# Patient Record
Sex: FEMALE | Race: WHITE | Employment: OTHER | URBAN - METROPOLITAN AREA
[De-identification: names, ages, dates, MRNs, and addresses within clinical notes are randomized per-mention and may not be internally consistent; named-entity substitution may affect disease eponyms.]

---

## 2021-01-01 ENCOUNTER — APPOINTMENT (OUTPATIENT)
Dept: INTERVENTIONAL RADIOLOGY/VASCULAR | Age: 71
DRG: 207 | End: 2021-01-01
Attending: INTERNAL MEDICINE
Payer: MEDICARE

## 2021-01-01 ENCOUNTER — APPOINTMENT (OUTPATIENT)
Dept: CT IMAGING | Age: 71
DRG: 207 | End: 2021-01-01
Attending: INTERNAL MEDICINE
Payer: MEDICARE

## 2021-01-01 ENCOUNTER — APPOINTMENT (OUTPATIENT)
Dept: GENERAL RADIOLOGY | Age: 71
DRG: 207 | End: 2021-01-01
Attending: INTERNAL MEDICINE
Payer: MEDICARE

## 2021-01-01 ENCOUNTER — HOSPITAL ENCOUNTER (EMERGENCY)
Age: 71
Discharge: ANOTHER HOSPITAL | End: 2021-12-09
Attending: EMERGENCY MEDICINE
Payer: MEDICARE

## 2021-01-01 ENCOUNTER — HOSPITAL ENCOUNTER (INPATIENT)
Age: 71
LOS: 13 days | DRG: 207 | End: 2021-12-22
Attending: INTERNAL MEDICINE | Admitting: INTERNAL MEDICINE
Payer: MEDICARE

## 2021-01-01 ENCOUNTER — APPOINTMENT (OUTPATIENT)
Dept: ULTRASOUND IMAGING | Age: 71
DRG: 207 | End: 2021-01-01
Attending: INTERNAL MEDICINE
Payer: MEDICARE

## 2021-01-01 ENCOUNTER — APPOINTMENT (OUTPATIENT)
Dept: GENERAL RADIOLOGY | Age: 71
End: 2021-01-01
Payer: MEDICARE

## 2021-01-01 VITALS
TEMPERATURE: 98.5 F | OXYGEN SATURATION: 92 % | RESPIRATION RATE: 18 BRPM | BODY MASS INDEX: 37.76 KG/M2 | HEART RATE: 74 BPM | WEIGHT: 200 LBS | DIASTOLIC BLOOD PRESSURE: 77 MMHG | SYSTOLIC BLOOD PRESSURE: 140 MMHG | HEIGHT: 61 IN

## 2021-01-01 VITALS
DIASTOLIC BLOOD PRESSURE: 52 MMHG | BODY MASS INDEX: 44.29 KG/M2 | SYSTOLIC BLOOD PRESSURE: 124 MMHG | WEIGHT: 234.57 LBS | OXYGEN SATURATION: 78 % | TEMPERATURE: 96.4 F | HEIGHT: 61 IN

## 2021-01-01 DIAGNOSIS — U07.1 PNEUMONIA DUE TO COVID-19 VIRUS: Primary | ICD-10-CM

## 2021-01-01 DIAGNOSIS — R06.03 RESPIRATORY DISTRESS: ICD-10-CM

## 2021-01-01 DIAGNOSIS — J12.82 PNEUMONIA DUE TO COVID-19 VIRUS: Primary | ICD-10-CM

## 2021-01-01 LAB
ALBUMIN SERPL-MCNC: 1.8 G/DL (ref 3.5–4.6)
ALBUMIN SERPL-MCNC: 2 G/DL (ref 3.5–4.6)
ALBUMIN SERPL-MCNC: 2.1 G/DL (ref 3.5–4.6)
ALBUMIN SERPL-MCNC: 2.1 G/DL (ref 3.5–4.6)
ALBUMIN SERPL-MCNC: 2.2 G/DL (ref 3.5–4.6)
ALBUMIN SERPL-MCNC: 2.2 G/DL (ref 3.5–4.6)
ALBUMIN SERPL-MCNC: 2.3 G/DL (ref 3.5–4.6)
ALBUMIN SERPL-MCNC: 2.4 G/DL (ref 3.5–4.6)
ALBUMIN SERPL-MCNC: 2.5 G/DL (ref 3.5–4.6)
ALBUMIN SERPL-MCNC: 2.6 G/DL (ref 3.5–4.6)
ALBUMIN SERPL-MCNC: 2.6 G/DL (ref 3.5–4.6)
ALBUMIN SERPL-MCNC: 2.7 G/DL (ref 3.5–4.6)
ALBUMIN SERPL-MCNC: 3.2 G/DL (ref 3.5–4.6)
ALBUMIN SERPL-MCNC: 3.3 G/DL (ref 3.5–4.6)
ALBUMIN SERPL-MCNC: 3.5 G/DL (ref 3.5–4.6)
ALP BLD-CCNC: 110 U/L (ref 40–130)
ALP BLD-CCNC: 118 U/L (ref 40–130)
ALP BLD-CCNC: 118 U/L (ref 40–130)
ALP BLD-CCNC: 121 U/L (ref 40–130)
ALP BLD-CCNC: 124 U/L (ref 40–130)
ALP BLD-CCNC: 125 U/L (ref 40–130)
ALP BLD-CCNC: 127 U/L (ref 40–130)
ALP BLD-CCNC: 130 U/L (ref 40–130)
ALP BLD-CCNC: 131 U/L (ref 40–130)
ALP BLD-CCNC: 133 U/L (ref 40–130)
ALP BLD-CCNC: 140 U/L (ref 40–130)
ALP BLD-CCNC: 140 U/L (ref 40–130)
ALP BLD-CCNC: 146 U/L (ref 40–130)
ALP BLD-CCNC: 147 U/L (ref 40–130)
ALP BLD-CCNC: 160 U/L (ref 40–130)
ALT SERPL-CCNC: 108 U/L (ref 0–33)
ALT SERPL-CCNC: 124 U/L (ref 0–33)
ALT SERPL-CCNC: 18 U/L (ref 0–33)
ALT SERPL-CCNC: 28 U/L (ref 0–33)
ALT SERPL-CCNC: 29 U/L (ref 0–33)
ALT SERPL-CCNC: 29 U/L (ref 0–33)
ALT SERPL-CCNC: 31 U/L (ref 0–33)
ALT SERPL-CCNC: 36 U/L (ref 0–33)
ALT SERPL-CCNC: 42 U/L (ref 0–33)
ALT SERPL-CCNC: 58 U/L (ref 0–33)
ALT SERPL-CCNC: 59 U/L (ref 0–33)
ALT SERPL-CCNC: 63 U/L (ref 0–33)
ALT SERPL-CCNC: 64 U/L (ref 0–33)
ALT SERPL-CCNC: 70 U/L (ref 0–33)
ALT SERPL-CCNC: 77 U/L (ref 0–33)
AMORPHOUS: ABNORMAL
ANION GAP SERPL CALCULATED.3IONS-SCNC: 10 MEQ/L (ref 9–15)
ANION GAP SERPL CALCULATED.3IONS-SCNC: 11 MEQ/L (ref 9–15)
ANION GAP SERPL CALCULATED.3IONS-SCNC: 11 MEQ/L (ref 9–15)
ANION GAP SERPL CALCULATED.3IONS-SCNC: 12 MEQ/L (ref 9–15)
ANION GAP SERPL CALCULATED.3IONS-SCNC: 13 MEQ/L (ref 9–15)
ANION GAP SERPL CALCULATED.3IONS-SCNC: 14 MEQ/L (ref 9–15)
ANION GAP SERPL CALCULATED.3IONS-SCNC: 14 MEQ/L (ref 9–15)
ANION GAP SERPL CALCULATED.3IONS-SCNC: 15 MEQ/L (ref 9–15)
ANION GAP SERPL CALCULATED.3IONS-SCNC: 16 MEQ/L (ref 9–15)
ANION GAP SERPL CALCULATED.3IONS-SCNC: 16 MEQ/L (ref 9–15)
ANION GAP SERPL CALCULATED.3IONS-SCNC: 20 MEQ/L (ref 9–15)
ANION GAP SERPL CALCULATED.3IONS-SCNC: 9 MEQ/L (ref 9–15)
ANISOCYTOSIS: ABNORMAL
APTT: 28.8 SEC (ref 24.4–36.8)
AST SERPL-CCNC: 108 U/L (ref 0–35)
AST SERPL-CCNC: 11 U/L (ref 0–35)
AST SERPL-CCNC: 13 U/L (ref 0–35)
AST SERPL-CCNC: 15 U/L (ref 0–35)
AST SERPL-CCNC: 153 U/L (ref 0–35)
AST SERPL-CCNC: 19 U/L (ref 0–35)
AST SERPL-CCNC: 21 U/L (ref 0–35)
AST SERPL-CCNC: 22 U/L (ref 0–35)
AST SERPL-CCNC: 22 U/L (ref 0–35)
AST SERPL-CCNC: 23 U/L (ref 0–35)
AST SERPL-CCNC: 25 U/L (ref 0–35)
AST SERPL-CCNC: 26 U/L (ref 0–35)
AST SERPL-CCNC: 68 U/L (ref 0–35)
AST SERPL-CCNC: 73 U/L (ref 0–35)
AST SERPL-CCNC: <5 U/L (ref 0–35)
ATYPICAL LYMPHOCYTE RELATIVE PERCENT: 1 %
BACTERIA: ABNORMAL /HPF
BACTERIA: NEGATIVE /HPF
BACTERIA: NEGATIVE /HPF
BANDED NEUTROPHILS RELATIVE PERCENT: 1 % (ref 5–11)
BANDED NEUTROPHILS RELATIVE PERCENT: 2 % (ref 5–11)
BANDED NEUTROPHILS RELATIVE PERCENT: 2 % (ref 5–11)
BANDED NEUTROPHILS RELATIVE PERCENT: 3 % (ref 5–11)
BANDED NEUTROPHILS RELATIVE PERCENT: 3 % (ref 5–11)
BANDED NEUTROPHILS RELATIVE PERCENT: ABNORMAL % (ref 5–11)
BASE EXCESS ARTERIAL: -1 (ref -3–3)
BASE EXCESS ARTERIAL: -10 (ref -3–3)
BASE EXCESS ARTERIAL: -12 (ref -3–3)
BASE EXCESS ARTERIAL: -13 (ref -3–3)
BASE EXCESS ARTERIAL: -2 (ref -3–3)
BASE EXCESS ARTERIAL: -3 (ref -3–3)
BASE EXCESS ARTERIAL: -3 (ref -3–3)
BASE EXCESS ARTERIAL: -4 (ref -3–3)
BASE EXCESS ARTERIAL: -5 (ref -3–3)
BASE EXCESS ARTERIAL: -6 (ref -3–3)
BASE EXCESS ARTERIAL: -9 (ref -3–3)
BASE EXCESS ARTERIAL: 0 (ref -3–3)
BASE EXCESS ARTERIAL: 1 (ref -3–3)
BASOPHILS ABSOLUTE: 0 K/UL (ref 0–0.1)
BASOPHILS ABSOLUTE: 0 K/UL (ref 0–0.2)
BASOPHILS ABSOLUTE: ABNORMAL K/UL (ref 0–0.2)
BASOPHILS RELATIVE PERCENT: 0 %
BASOPHILS RELATIVE PERCENT: 0 %
BASOPHILS RELATIVE PERCENT: 0.1 %
BASOPHILS RELATIVE PERCENT: 0.2 %
BASOPHILS RELATIVE PERCENT: 0.2 %
BASOPHILS RELATIVE PERCENT: 0.3 %
BASOPHILS RELATIVE PERCENT: 0.3 % (ref 0.1–1.2)
BASOPHILS RELATIVE PERCENT: ABNORMAL %
BILIRUB SERPL-MCNC: 0.3 MG/DL (ref 0.2–0.7)
BILIRUB SERPL-MCNC: 0.4 MG/DL (ref 0.2–0.7)
BILIRUB SERPL-MCNC: 0.5 MG/DL (ref 0.2–0.7)
BILIRUB SERPL-MCNC: 0.5 MG/DL (ref 0.2–0.7)
BILIRUB SERPL-MCNC: 3.1 MG/DL (ref 0.2–0.7)
BILIRUB SERPL-MCNC: <0.2 MG/DL (ref 0.2–0.7)
BILIRUB SERPL-MCNC: <0.2 MG/DL (ref 0.2–0.7)
BILIRUBIN URINE: NEGATIVE
BLOOD CULTURE, ROUTINE: NORMAL
BLOOD CULTURE, ROUTINE: NORMAL
BLOOD, URINE: ABNORMAL
BLOOD, URINE: ABNORMAL
BLOOD, URINE: NEGATIVE
BUN BLDV-MCNC: 109 MG/DL (ref 8–23)
BUN BLDV-MCNC: 112 MG/DL (ref 8–23)
BUN BLDV-MCNC: 116 MG/DL (ref 8–23)
BUN BLDV-MCNC: 118 MG/DL (ref 8–23)
BUN BLDV-MCNC: 120 MG/DL (ref 8–23)
BUN BLDV-MCNC: 20 MG/DL (ref 8–23)
BUN BLDV-MCNC: 22 MG/DL (ref 8–23)
BUN BLDV-MCNC: 23 MG/DL (ref 8–23)
BUN BLDV-MCNC: 29 MG/DL (ref 8–23)
BUN BLDV-MCNC: 40 MG/DL (ref 8–23)
BUN BLDV-MCNC: 45 MG/DL (ref 8–23)
BUN BLDV-MCNC: 53 MG/DL (ref 8–23)
BUN BLDV-MCNC: 57 MG/DL (ref 8–23)
BUN BLDV-MCNC: 68 MG/DL (ref 8–23)
BUN BLDV-MCNC: 83 MG/DL (ref 8–23)
BUN BLDV-MCNC: 93 MG/DL (ref 8–23)
C-REACTIVE PROTEIN: 22.5 MG/L (ref 0–5)
C-REACTIVE PROTEIN: 38.8 MG/L (ref 0–5)
C-REACTIVE PROTEIN: 57.8 MG/L (ref 0–5)
CALCIUM IONIZED: 1.15 MMOL/L (ref 1.12–1.32)
CALCIUM IONIZED: 1.17 MMOL/L (ref 1.12–1.32)
CALCIUM IONIZED: 1.2 MMOL/L (ref 1.12–1.32)
CALCIUM IONIZED: 1.2 MMOL/L (ref 1.12–1.32)
CALCIUM IONIZED: 1.21 MMOL/L (ref 1.12–1.32)
CALCIUM IONIZED: 1.21 MMOL/L (ref 1.12–1.32)
CALCIUM IONIZED: 1.22 MMOL/L (ref 1.12–1.32)
CALCIUM IONIZED: 1.22 MMOL/L (ref 1.12–1.32)
CALCIUM IONIZED: 1.23 MMOL/L (ref 1.12–1.32)
CALCIUM IONIZED: 1.23 MMOL/L (ref 1.12–1.32)
CALCIUM IONIZED: 1.24 MMOL/L (ref 1.12–1.32)
CALCIUM IONIZED: 1.24 MMOL/L (ref 1.12–1.32)
CALCIUM IONIZED: 1.25 MMOL/L (ref 1.12–1.32)
CALCIUM IONIZED: 1.25 MMOL/L (ref 1.12–1.32)
CALCIUM IONIZED: 1.26 MMOL/L (ref 1.12–1.32)
CALCIUM IONIZED: 1.27 MMOL/L (ref 1.12–1.32)
CALCIUM IONIZED: 1.27 MMOL/L (ref 1.12–1.32)
CALCIUM IONIZED: 1.28 MMOL/L (ref 1.12–1.32)
CALCIUM IONIZED: 1.29 MMOL/L (ref 1.12–1.32)
CALCIUM IONIZED: 1.29 MMOL/L (ref 1.12–1.32)
CALCIUM IONIZED: 1.3 MMOL/L (ref 1.12–1.32)
CALCIUM IONIZED: 1.31 MMOL/L (ref 1.12–1.32)
CALCIUM IONIZED: 1.32 MMOL/L (ref 1.12–1.32)
CALCIUM IONIZED: 1.34 MMOL/L (ref 1.12–1.32)
CALCIUM IONIZED: 1.37 MMOL/L (ref 1.12–1.32)
CALCIUM SERPL-MCNC: 7.5 MG/DL (ref 8.5–9.9)
CALCIUM SERPL-MCNC: 7.8 MG/DL (ref 8.5–9.9)
CALCIUM SERPL-MCNC: 8.1 MG/DL (ref 8.5–9.9)
CALCIUM SERPL-MCNC: 8.2 MG/DL (ref 8.5–9.9)
CALCIUM SERPL-MCNC: 8.6 MG/DL (ref 8.5–9.9)
CALCIUM SERPL-MCNC: 8.7 MG/DL (ref 8.5–9.9)
CALCIUM SERPL-MCNC: 8.7 MG/DL (ref 8.5–9.9)
CALCIUM SERPL-MCNC: 8.8 MG/DL (ref 8.5–9.9)
CALCIUM SERPL-MCNC: 8.9 MG/DL (ref 8.5–9.9)
CALCIUM SERPL-MCNC: 9 MG/DL (ref 8.5–9.9)
CALCIUM SERPL-MCNC: 9.2 MG/DL (ref 8.5–9.9)
CHLORIDE BLD-SCNC: 102 MEQ/L (ref 95–107)
CHLORIDE BLD-SCNC: 103 MEQ/L (ref 95–107)
CHLORIDE BLD-SCNC: 104 MEQ/L (ref 95–107)
CHLORIDE BLD-SCNC: 105 MEQ/L (ref 95–107)
CHLORIDE BLD-SCNC: 106 MEQ/L (ref 95–107)
CHLORIDE BLD-SCNC: 106 MEQ/L (ref 95–107)
CHLORIDE BLD-SCNC: 108 MEQ/L (ref 95–107)
CHLORIDE BLD-SCNC: 110 MEQ/L (ref 95–107)
CHLORIDE BLD-SCNC: 112 MEQ/L (ref 95–107)
CHLORIDE BLD-SCNC: 112 MEQ/L (ref 95–107)
CHLORIDE BLD-SCNC: 120 MEQ/L (ref 95–107)
CHLORIDE BLD-SCNC: 97 MEQ/L (ref 95–107)
CHLORIDE BLD-SCNC: 97 MEQ/L (ref 95–107)
CHLORIDE BLD-SCNC: 98 MEQ/L (ref 95–107)
CLARITY: ABNORMAL
CLARITY: CLEAR
CLARITY: CLEAR
CO2: 12 MEQ/L (ref 20–31)
CO2: 17 MEQ/L (ref 20–31)
CO2: 18 MEQ/L (ref 20–31)
CO2: 18 MEQ/L (ref 20–31)
CO2: 19 MEQ/L (ref 20–31)
CO2: 20 MEQ/L (ref 20–31)
CO2: 22 MEQ/L (ref 20–31)
CO2: 23 MEQ/L (ref 20–31)
CO2: 23 MEQ/L (ref 20–31)
CO2: 25 MEQ/L (ref 20–31)
CO2: 25 MEQ/L (ref 20–31)
CO2: 29 MEQ/L (ref 20–31)
COLOR: YELLOW
CREAT SERPL-MCNC: 0.64 MG/DL (ref 0.5–0.9)
CREAT SERPL-MCNC: 0.76 MG/DL (ref 0.5–0.9)
CREAT SERPL-MCNC: 0.81 MG/DL (ref 0.5–0.9)
CREAT SERPL-MCNC: 0.94 MG/DL (ref 0.5–0.9)
CREAT SERPL-MCNC: 1 MG/DL (ref 0.5–0.9)
CREAT SERPL-MCNC: 1.19 MG/DL (ref 0.5–0.9)
CREAT SERPL-MCNC: 1.96 MG/DL (ref 0.5–0.9)
CREAT SERPL-MCNC: 1.99 MG/DL (ref 0.5–0.9)
CREAT SERPL-MCNC: 2.51 MG/DL (ref 0.5–0.9)
CREAT SERPL-MCNC: 2.92 MG/DL (ref 0.5–0.9)
CREAT SERPL-MCNC: 3.01 MG/DL (ref 0.5–0.9)
CREAT SERPL-MCNC: 3.12 MG/DL (ref 0.5–0.9)
CREAT SERPL-MCNC: 3.2 MG/DL (ref 0.5–0.9)
CREAT SERPL-MCNC: 3.36 MG/DL (ref 0.5–0.9)
CREAT SERPL-MCNC: 3.36 MG/DL (ref 0.5–0.9)
CREAT SERPL-MCNC: 3.41 MG/DL (ref 0.5–0.9)
CULTURE, BLOOD 2: NORMAL
CULTURE, RESPIRATORY: ABNORMAL
CULTURE, RESPIRATORY: ABNORMAL
D DIMER: 1.85 MG/L FEU (ref 0–0.5)
D DIMER: 1.89 MG/L FEU (ref 0–0.5)
D DIMER: 2.03 MG/L FEU (ref 0–0.5)
D DIMER: 3.8 MG/L FEU (ref 0–0.5)
EKG ATRIAL RATE: 79 BPM
EKG P AXIS: 26 DEGREES
EKG P-R INTERVAL: 164 MS
EKG Q-T INTERVAL: 362 MS
EKG QRS DURATION: 90 MS
EKG QTC CALCULATION (BAZETT): 415 MS
EKG R AXIS: 12 DEGREES
EKG T AXIS: 28 DEGREES
EKG VENTRICULAR RATE: 79 BPM
EOSINOPHILS ABSOLUTE: 0 K/UL (ref 0–0.4)
EOSINOPHILS ABSOLUTE: 0 K/UL (ref 0–0.7)
EOSINOPHILS ABSOLUTE: 0.1 K/UL (ref 0–0.7)
EOSINOPHILS ABSOLUTE: 0.1 K/UL (ref 0–0.7)
EOSINOPHILS ABSOLUTE: 0.6 K/UL (ref 0–0.7)
EOSINOPHILS ABSOLUTE: ABNORMAL K/UL (ref 0–0.7)
EOSINOPHILS RELATIVE PERCENT: 0 %
EOSINOPHILS RELATIVE PERCENT: 0 % (ref 0.7–5.8)
EOSINOPHILS RELATIVE PERCENT: 0.1 %
EOSINOPHILS RELATIVE PERCENT: 0.1 %
EOSINOPHILS RELATIVE PERCENT: 0.2 %
EOSINOPHILS RELATIVE PERCENT: 0.3 %
EOSINOPHILS RELATIVE PERCENT: 0.4 %
EOSINOPHILS RELATIVE PERCENT: 0.7 %
EOSINOPHILS RELATIVE PERCENT: 0.9 %
EOSINOPHILS RELATIVE PERCENT: 3 %
EOSINOPHILS RELATIVE PERCENT: ABNORMAL %
EPITHELIAL CELLS, UA: ABNORMAL /HPF
EPITHELIAL CELLS, UA: ABNORMAL /HPF (ref 0–5)
EPITHELIAL CELLS, UA: NORMAL /HPF (ref 0–5)
FERRITIN: 1655 UG/L (ref 13–150)
FERRITIN: 999 UG/L (ref 13–150)
FIBRINOGEN: 704 MG/DL (ref 235–507)
GFR AFRICAN AMERICAN: 15
GFR AFRICAN AMERICAN: 15
GFR AFRICAN AMERICAN: 16
GFR AFRICAN AMERICAN: 16.3
GFR AFRICAN AMERICAN: 16.3
GFR AFRICAN AMERICAN: 17
GFR AFRICAN AMERICAN: 17.2
GFR AFRICAN AMERICAN: 17.7
GFR AFRICAN AMERICAN: 18
GFR AFRICAN AMERICAN: 18
GFR AFRICAN AMERICAN: 18.5
GFR AFRICAN AMERICAN: 19
GFR AFRICAN AMERICAN: 19.1
GFR AFRICAN AMERICAN: 20
GFR AFRICAN AMERICAN: 22.8
GFR AFRICAN AMERICAN: 24
GFR AFRICAN AMERICAN: 24
GFR AFRICAN AMERICAN: 27
GFR AFRICAN AMERICAN: 29.8
GFR AFRICAN AMERICAN: 30.3
GFR AFRICAN AMERICAN: 49
GFR AFRICAN AMERICAN: 49
GFR AFRICAN AMERICAN: 53
GFR AFRICAN AMERICAN: 53.9
GFR AFRICAN AMERICAN: 59
GFR AFRICAN AMERICAN: >60
GFR NON-AFRICAN AMERICAN: 12
GFR NON-AFRICAN AMERICAN: 12
GFR NON-AFRICAN AMERICAN: 13
GFR NON-AFRICAN AMERICAN: 13.2
GFR NON-AFRICAN AMERICAN: 13.5
GFR NON-AFRICAN AMERICAN: 13.5
GFR NON-AFRICAN AMERICAN: 14
GFR NON-AFRICAN AMERICAN: 14.2
GFR NON-AFRICAN AMERICAN: 14.7
GFR NON-AFRICAN AMERICAN: 15
GFR NON-AFRICAN AMERICAN: 15.3
GFR NON-AFRICAN AMERICAN: 15.8
GFR NON-AFRICAN AMERICAN: 17
GFR NON-AFRICAN AMERICAN: 18.8
GFR NON-AFRICAN AMERICAN: 20
GFR NON-AFRICAN AMERICAN: 20
GFR NON-AFRICAN AMERICAN: 22
GFR NON-AFRICAN AMERICAN: 24.6
GFR NON-AFRICAN AMERICAN: 25.1
GFR NON-AFRICAN AMERICAN: 40
GFR NON-AFRICAN AMERICAN: 40
GFR NON-AFRICAN AMERICAN: 44
GFR NON-AFRICAN AMERICAN: 44.6
GFR NON-AFRICAN AMERICAN: 49
GFR NON-AFRICAN AMERICAN: 54.5
GFR NON-AFRICAN AMERICAN: 58.5
GFR NON-AFRICAN AMERICAN: >60
GLOBULIN: 3.1 G/DL (ref 2.3–3.5)
GLOBULIN: 3.4 G/DL (ref 2.3–3.5)
GLOBULIN: 3.5 G/DL (ref 2.3–3.5)
GLOBULIN: 3.6 G/DL (ref 2.3–3.5)
GLOBULIN: 3.7 G/DL (ref 2.3–3.5)
GLOBULIN: 3.7 G/DL (ref 2.3–3.5)
GLOBULIN: 3.8 G/DL (ref 2.3–3.5)
GLOBULIN: 4 G/DL (ref 2.3–3.5)
GLOBULIN: 4.2 G/DL (ref 2.3–3.5)
GLOBULIN: 4.4 G/DL (ref 2.3–3.5)
GLUCOSE BLD-MCNC: 101 MG/DL (ref 60–115)
GLUCOSE BLD-MCNC: 104 MG/DL (ref 60–115)
GLUCOSE BLD-MCNC: 104 MG/DL (ref 70–99)
GLUCOSE BLD-MCNC: 109 MG/DL (ref 70–99)
GLUCOSE BLD-MCNC: 111 MG/DL (ref 60–115)
GLUCOSE BLD-MCNC: 113 MG/DL (ref 70–99)
GLUCOSE BLD-MCNC: 115 MG/DL (ref 60–115)
GLUCOSE BLD-MCNC: 120 MG/DL (ref 70–99)
GLUCOSE BLD-MCNC: 122 MG/DL (ref 60–115)
GLUCOSE BLD-MCNC: 124 MG/DL (ref 60–115)
GLUCOSE BLD-MCNC: 124 MG/DL (ref 70–99)
GLUCOSE BLD-MCNC: 125 MG/DL (ref 60–115)
GLUCOSE BLD-MCNC: 133 MG/DL (ref 60–115)
GLUCOSE BLD-MCNC: 134 MG/DL (ref 60–115)
GLUCOSE BLD-MCNC: 135 MG/DL (ref 60–115)
GLUCOSE BLD-MCNC: 136 MG/DL (ref 60–115)
GLUCOSE BLD-MCNC: 136 MG/DL (ref 60–115)
GLUCOSE BLD-MCNC: 139 MG/DL (ref 60–115)
GLUCOSE BLD-MCNC: 144 MG/DL (ref 70–99)
GLUCOSE BLD-MCNC: 148 MG/DL (ref 60–115)
GLUCOSE BLD-MCNC: 149 MG/DL (ref 60–115)
GLUCOSE BLD-MCNC: 149 MG/DL (ref 60–115)
GLUCOSE BLD-MCNC: 150 MG/DL (ref 60–115)
GLUCOSE BLD-MCNC: 151 MG/DL (ref 60–115)
GLUCOSE BLD-MCNC: 151 MG/DL (ref 60–115)
GLUCOSE BLD-MCNC: 152 MG/DL (ref 60–115)
GLUCOSE BLD-MCNC: 154 MG/DL (ref 60–115)
GLUCOSE BLD-MCNC: 157 MG/DL (ref 70–99)
GLUCOSE BLD-MCNC: 158 MG/DL (ref 60–115)
GLUCOSE BLD-MCNC: 159 MG/DL (ref 60–115)
GLUCOSE BLD-MCNC: 161 MG/DL (ref 60–115)
GLUCOSE BLD-MCNC: 162 MG/DL (ref 60–115)
GLUCOSE BLD-MCNC: 163 MG/DL (ref 60–115)
GLUCOSE BLD-MCNC: 164 MG/DL (ref 60–115)
GLUCOSE BLD-MCNC: 164 MG/DL (ref 70–99)
GLUCOSE BLD-MCNC: 165 MG/DL (ref 60–115)
GLUCOSE BLD-MCNC: 168 MG/DL (ref 60–115)
GLUCOSE BLD-MCNC: 168 MG/DL (ref 60–115)
GLUCOSE BLD-MCNC: 170 MG/DL (ref 60–115)
GLUCOSE BLD-MCNC: 171 MG/DL (ref 60–115)
GLUCOSE BLD-MCNC: 172 MG/DL (ref 60–115)
GLUCOSE BLD-MCNC: 173 MG/DL (ref 60–115)
GLUCOSE BLD-MCNC: 175 MG/DL (ref 60–115)
GLUCOSE BLD-MCNC: 175 MG/DL (ref 60–115)
GLUCOSE BLD-MCNC: 176 MG/DL (ref 60–115)
GLUCOSE BLD-MCNC: 177 MG/DL (ref 60–115)
GLUCOSE BLD-MCNC: 178 MG/DL (ref 60–115)
GLUCOSE BLD-MCNC: 180 MG/DL (ref 60–115)
GLUCOSE BLD-MCNC: 180 MG/DL (ref 70–99)
GLUCOSE BLD-MCNC: 181 MG/DL (ref 60–115)
GLUCOSE BLD-MCNC: 181 MG/DL (ref 70–99)
GLUCOSE BLD-MCNC: 182 MG/DL (ref 60–115)
GLUCOSE BLD-MCNC: 183 MG/DL (ref 60–115)
GLUCOSE BLD-MCNC: 184 MG/DL (ref 60–115)
GLUCOSE BLD-MCNC: 186 MG/DL (ref 70–99)
GLUCOSE BLD-MCNC: 187 MG/DL (ref 60–115)
GLUCOSE BLD-MCNC: 189 MG/DL (ref 60–115)
GLUCOSE BLD-MCNC: 190 MG/DL (ref 60–115)
GLUCOSE BLD-MCNC: 191 MG/DL (ref 60–115)
GLUCOSE BLD-MCNC: 192 MG/DL (ref 60–115)
GLUCOSE BLD-MCNC: 192 MG/DL (ref 70–99)
GLUCOSE BLD-MCNC: 193 MG/DL (ref 60–115)
GLUCOSE BLD-MCNC: 197 MG/DL (ref 70–99)
GLUCOSE BLD-MCNC: 198 MG/DL (ref 60–115)
GLUCOSE BLD-MCNC: 200 MG/DL (ref 60–115)
GLUCOSE BLD-MCNC: 200 MG/DL (ref 60–115)
GLUCOSE BLD-MCNC: 201 MG/DL (ref 60–115)
GLUCOSE BLD-MCNC: 205 MG/DL (ref 60–115)
GLUCOSE BLD-MCNC: 209 MG/DL (ref 60–115)
GLUCOSE BLD-MCNC: 210 MG/DL (ref 60–115)
GLUCOSE BLD-MCNC: 211 MG/DL (ref 60–115)
GLUCOSE BLD-MCNC: 213 MG/DL (ref 60–115)
GLUCOSE BLD-MCNC: 215 MG/DL (ref 60–115)
GLUCOSE BLD-MCNC: 215 MG/DL (ref 60–115)
GLUCOSE BLD-MCNC: 218 MG/DL (ref 60–115)
GLUCOSE BLD-MCNC: 218 MG/DL (ref 60–115)
GLUCOSE BLD-MCNC: 219 MG/DL (ref 60–115)
GLUCOSE BLD-MCNC: 220 MG/DL (ref 60–115)
GLUCOSE BLD-MCNC: 222 MG/DL (ref 60–115)
GLUCOSE BLD-MCNC: 223 MG/DL (ref 70–99)
GLUCOSE BLD-MCNC: 225 MG/DL (ref 60–115)
GLUCOSE BLD-MCNC: 227 MG/DL (ref 60–115)
GLUCOSE BLD-MCNC: 228 MG/DL (ref 60–115)
GLUCOSE BLD-MCNC: 235 MG/DL (ref 70–99)
GLUCOSE BLD-MCNC: 239 MG/DL (ref 60–115)
GLUCOSE BLD-MCNC: 240 MG/DL (ref 60–115)
GLUCOSE BLD-MCNC: 240 MG/DL (ref 60–115)
GLUCOSE BLD-MCNC: 244 MG/DL (ref 60–115)
GLUCOSE BLD-MCNC: 246 MG/DL (ref 60–115)
GLUCOSE BLD-MCNC: 262 MG/DL (ref 70–99)
GLUCOSE BLD-MCNC: 270 MG/DL (ref 60–115)
GLUCOSE BLD-MCNC: 76 MG/DL (ref 60–115)
GLUCOSE BLD-MCNC: 94 MG/DL (ref 60–115)
GLUCOSE BLD-MCNC: 97 MG/DL (ref 60–115)
GLUCOSE URINE: NEGATIVE MG/DL
GRAM STAIN RESULT: ABNORMAL
GRAM STAIN RESULT: ABNORMAL
HBA1C MFR BLD: 7.1 % (ref 4.8–5.9)
HBA1C MFR BLD: 7.1 % (ref 4.8–5.9)
HCO3 ARTERIAL: 17.3 MMOL/L (ref 21–29)
HCO3 ARTERIAL: 18.5 MMOL/L (ref 21–29)
HCO3 ARTERIAL: 19.2 MMOL/L (ref 21–29)
HCO3 ARTERIAL: 19.5 MMOL/L (ref 21–29)
HCO3 ARTERIAL: 19.8 MMOL/L (ref 21–29)
HCO3 ARTERIAL: 20.4 MMOL/L (ref 21–29)
HCO3 ARTERIAL: 20.5 MMOL/L (ref 21–29)
HCO3 ARTERIAL: 20.9 MMOL/L (ref 21–29)
HCO3 ARTERIAL: 21.4 MMOL/L (ref 21–29)
HCO3 ARTERIAL: 21.7 MMOL/L (ref 21–29)
HCO3 ARTERIAL: 21.8 MMOL/L (ref 21–29)
HCO3 ARTERIAL: 21.8 MMOL/L (ref 21–29)
HCO3 ARTERIAL: 22.6 MMOL/L (ref 21–29)
HCO3 ARTERIAL: 22.6 MMOL/L (ref 21–29)
HCO3 ARTERIAL: 22.8 MMOL/L (ref 21–29)
HCO3 ARTERIAL: 23.3 MMOL/L (ref 21–29)
HCO3 ARTERIAL: 23.3 MMOL/L (ref 21–29)
HCO3 ARTERIAL: 23.7 MMOL/L (ref 21–29)
HCO3 ARTERIAL: 23.8 MMOL/L (ref 21–29)
HCO3 ARTERIAL: 24.8 MMOL/L (ref 21–29)
HCO3 ARTERIAL: 24.9 MMOL/L (ref 21–29)
HCO3 ARTERIAL: 24.9 MMOL/L (ref 21–29)
HCO3 ARTERIAL: 25.4 MMOL/L (ref 21–29)
HCO3 ARTERIAL: 25.4 MMOL/L (ref 21–29)
HCO3 ARTERIAL: 25.7 MMOL/L (ref 21–29)
HCO3 ARTERIAL: 25.8 MMOL/L (ref 21–29)
HCO3 ARTERIAL: 26 MMOL/L (ref 21–29)
HCO3 ARTERIAL: 26.1 MMOL/L (ref 21–29)
HCO3 ARTERIAL: 26.4 MMOL/L (ref 21–29)
HCO3 ARTERIAL: 27.7 MMOL/L (ref 21–29)
HCO3 ARTERIAL: 27.8 MMOL/L (ref 21–29)
HCO3 ARTERIAL: 28 MMOL/L (ref 21–29)
HCO3 ARTERIAL: 28 MMOL/L (ref 21–29)
HCO3 ARTERIAL: 28.5 MMOL/L (ref 21–29)
HCO3 ARTERIAL: 28.5 MMOL/L (ref 21–29)
HCT VFR BLD CALC: 30 % (ref 37–47)
HCT VFR BLD CALC: 31.4 % (ref 37–47)
HCT VFR BLD CALC: 31.6 % (ref 37–47)
HCT VFR BLD CALC: 31.9 % (ref 37–47)
HCT VFR BLD CALC: 33.4 % (ref 37–47)
HCT VFR BLD CALC: 33.8 % (ref 37–47)
HCT VFR BLD CALC: 36.9 % (ref 37–47)
HCT VFR BLD CALC: 37.4 % (ref 37–47)
HCT VFR BLD CALC: 38 % (ref 37–47)
HCT VFR BLD CALC: 39.3 % (ref 37–47)
HCT VFR BLD CALC: 41 % (ref 37–47)
HCT VFR BLD CALC: 41.1 % (ref 37–47)
HCT VFR BLD CALC: 41.4 % (ref 37–47)
HCT VFR BLD CALC: 41.5 % (ref 37–47)
HCT VFR BLD CALC: ABNORMAL % (ref 37–47)
HEMOGLOBIN: 10.1 G/DL (ref 12–16)
HEMOGLOBIN: 10.1 G/DL (ref 12–16)
HEMOGLOBIN: 10.1 GM/DL (ref 12–16)
HEMOGLOBIN: 10.2 GM/DL (ref 12–16)
HEMOGLOBIN: 10.3 GM/DL (ref 12–16)
HEMOGLOBIN: 10.3 GM/DL (ref 12–16)
HEMOGLOBIN: 10.4 GM/DL (ref 12–16)
HEMOGLOBIN: 10.5 GM/DL (ref 12–16)
HEMOGLOBIN: 10.7 G/DL (ref 12–16)
HEMOGLOBIN: 10.8 G/DL (ref 12–16)
HEMOGLOBIN: 10.9 GM/DL (ref 12–16)
HEMOGLOBIN: 11.1 GM/DL (ref 12–16)
HEMOGLOBIN: 11.1 GM/DL (ref 12–16)
HEMOGLOBIN: 11.2 GM/DL (ref 12–16)
HEMOGLOBIN: 11.5 GM/DL (ref 12–16)
HEMOGLOBIN: 11.6 GM/DL (ref 12–16)
HEMOGLOBIN: 11.7 G/DL (ref 12–16)
HEMOGLOBIN: 11.8 GM/DL (ref 12–16)
HEMOGLOBIN: 11.9 G/DL (ref 12–16)
HEMOGLOBIN: 11.9 G/DL (ref 12–16)
HEMOGLOBIN: 12.2 GM/DL (ref 12–16)
HEMOGLOBIN: 12.2 GM/DL (ref 12–16)
HEMOGLOBIN: 12.3 GM/DL (ref 12–16)
HEMOGLOBIN: 12.4 GM/DL (ref 12–16)
HEMOGLOBIN: 12.6 G/DL (ref 12–16)
HEMOGLOBIN: 12.6 GM/DL (ref 12–16)
HEMOGLOBIN: 12.7 GM/DL (ref 12–16)
HEMOGLOBIN: 12.8 GM/DL (ref 12–16)
HEMOGLOBIN: 12.9 GM/DL (ref 12–16)
HEMOGLOBIN: 13 GM/DL (ref 12–16)
HEMOGLOBIN: 13 GM/DL (ref 12–16)
HEMOGLOBIN: 13.4 G/DL (ref 12–16)
HEMOGLOBIN: 13.4 GM/DL (ref 12–16)
HEMOGLOBIN: 13.5 G/DL (ref 12–16)
HEMOGLOBIN: 13.6 G/DL (ref 12–16)
HEMOGLOBIN: 13.6 GM/DL (ref 12–16)
HEMOGLOBIN: 13.7 G/DL (ref 11.2–15.7)
HEMOGLOBIN: 14 GM/DL (ref 12–16)
HEMOGLOBIN: 14 GM/DL (ref 12–16)
HEMOGLOBIN: 14.1 GM/DL (ref 12–16)
HEMOGLOBIN: 14.7 GM/DL (ref 12–16)
HEMOGLOBIN: 14.7 GM/DL (ref 12–16)
HEMOGLOBIN: 14.8 GM/DL (ref 12–16)
HEMOGLOBIN: 14.8 GM/DL (ref 12–16)
HEMOGLOBIN: 15 GM/DL (ref 12–16)
HEMOGLOBIN: 15.1 GM/DL (ref 12–16)
HEMOGLOBIN: 15.6 GM/DL (ref 12–16)
HEMOGLOBIN: 9.3 G/DL (ref 12–16)
HEMOGLOBIN: 9.9 G/DL (ref 12–16)
HEMOGLOBIN: ABNORMAL G/DL (ref 12–16)
HYALINE CASTS: ABNORMAL /HPF (ref 0–5)
HYALINE CASTS: NORMAL /HPF (ref 0–5)
IMMATURE GRANULOCYTES #: 0.1 K/UL
IMMATURE GRANULOCYTES %: 1.8 %
INR BLD: 1
KETONES, URINE: 15 MG/DL
KETONES, URINE: NEGATIVE MG/DL
KETONES, URINE: NEGATIVE MG/DL
LACTATE DEHYDROGENASE: 438 U/L (ref 135–214)
LACTATE DEHYDROGENASE: 622 U/L (ref 135–214)
LACTATE: 0.54 MMOL/L (ref 0.4–2)
LACTATE: 0.73 MMOL/L (ref 0.4–2)
LACTATE: 0.73 MMOL/L (ref 0.4–2)
LACTATE: 0.81 MMOL/L (ref 0.4–2)
LACTATE: 0.81 MMOL/L (ref 0.4–2)
LACTATE: 0.82 MMOL/L (ref 0.4–2)
LACTATE: 0.86 MMOL/L (ref 0.4–2)
LACTATE: 0.88 MMOL/L (ref 0.4–2)
LACTATE: 0.93 MMOL/L (ref 0.4–2)
LACTATE: 0.93 MMOL/L (ref 0.4–2)
LACTATE: 0.94 MMOL/L (ref 0.4–2)
LACTATE: 0.95 MMOL/L (ref 0.4–2)
LACTATE: 0.98 MMOL/L (ref 0.4–2)
LACTATE: 0.99 MMOL/L (ref 0.4–2)
LACTATE: 1.03 MMOL/L (ref 0.4–2)
LACTATE: 1.05 MMOL/L (ref 0.4–2)
LACTATE: 1.08 MMOL/L (ref 0.4–2)
LACTATE: 1.08 MMOL/L (ref 0.4–2)
LACTATE: 1.11 MMOL/L (ref 0.4–2)
LACTATE: 1.11 MMOL/L (ref 0.4–2)
LACTATE: 1.12 MMOL/L (ref 0.4–2)
LACTATE: 1.14 MMOL/L (ref 0.4–2)
LACTATE: 1.17 MMOL/L (ref 0.4–2)
LACTATE: 1.18 MMOL/L (ref 0.4–2)
LACTATE: 1.2 MMOL/L (ref 0.4–2)
LACTATE: 1.23 MMOL/L (ref 0.4–2)
LACTATE: 1.26 MMOL/L (ref 0.4–2)
LACTATE: 1.28 MMOL/L (ref 0.4–2)
LACTATE: 1.29 MMOL/L (ref 0.4–2)
LACTATE: 1.31 MMOL/L (ref 0.4–2)
LACTATE: 1.46 MMOL/L (ref 0.4–2)
LACTATE: 1.61 MMOL/L (ref 0.4–2)
LACTATE: 1.86 MMOL/L (ref 0.4–2)
LACTIC ACID: 1.5 MMOL/L (ref 0.5–2.2)
LACTIC ACID: 1.6 MMOL/L (ref 0.5–2.2)
LEUKOCYTE ESTERASE, URINE: ABNORMAL
LEUKOCYTE ESTERASE, URINE: NEGATIVE
LEUKOCYTE ESTERASE, URINE: NEGATIVE
LYMPHOCYTES ABSOLUTE: 0 K/UL (ref 1–4.8)
LYMPHOCYTES ABSOLUTE: 0.2 K/UL (ref 1–4.8)
LYMPHOCYTES ABSOLUTE: 0.3 K/UL (ref 1–4.8)
LYMPHOCYTES ABSOLUTE: 0.4 K/UL (ref 1–4.8)
LYMPHOCYTES ABSOLUTE: 0.9 K/UL (ref 1–4.8)
LYMPHOCYTES ABSOLUTE: 1 K/UL (ref 1.2–3.7)
LYMPHOCYTES ABSOLUTE: 1.1 K/UL (ref 1–4.8)
LYMPHOCYTES ABSOLUTE: ABNORMAL K/UL (ref 1–4.8)
LYMPHOCYTES RELATIVE PERCENT: 1 %
LYMPHOCYTES RELATIVE PERCENT: 1.8 %
LYMPHOCYTES RELATIVE PERCENT: 11 %
LYMPHOCYTES RELATIVE PERCENT: 14.8 %
LYMPHOCYTES RELATIVE PERCENT: 15 %
LYMPHOCYTES RELATIVE PERCENT: 2 %
LYMPHOCYTES RELATIVE PERCENT: 3.4 %
LYMPHOCYTES RELATIVE PERCENT: 3.8 %
LYMPHOCYTES RELATIVE PERCENT: 4.7 %
LYMPHOCYTES RELATIVE PERCENT: 5.6 %
LYMPHOCYTES RELATIVE PERCENT: ABNORMAL %
MAGNESIUM: 2.2 MG/DL (ref 1.7–2.4)
MAGNESIUM: 2.4 MG/DL (ref 1.7–2.4)
MAGNESIUM: 2.6 MG/DL (ref 1.7–2.4)
MAGNESIUM: 2.6 MG/DL (ref 1.7–2.4)
MAGNESIUM: 2.8 MG/DL (ref 1.7–2.4)
MAGNESIUM: 2.9 MG/DL (ref 1.7–2.4)
MAGNESIUM: 3.1 MG/DL (ref 1.7–2.4)
MAGNESIUM: 3.2 MG/DL (ref 1.7–2.4)
MAGNESIUM: 3.2 MG/DL (ref 1.7–2.4)
MAGNESIUM: 3.3 MG/DL (ref 1.7–2.4)
MAGNESIUM: 3.3 MG/DL (ref 1.7–2.4)
MCH RBC QN AUTO: 27.5 PG (ref 27–31.3)
MCH RBC QN AUTO: 28.2 PG (ref 27–31.3)
MCH RBC QN AUTO: 28.3 PG (ref 27–31.3)
MCH RBC QN AUTO: 28.4 PG (ref 27–31.3)
MCH RBC QN AUTO: 28.5 PG (ref 25.6–32.2)
MCH RBC QN AUTO: 28.5 PG (ref 27–31.3)
MCH RBC QN AUTO: 28.6 PG (ref 27–31.3)
MCH RBC QN AUTO: 28.6 PG (ref 27–31.3)
MCH RBC QN AUTO: 29 PG (ref 27–31.3)
MCH RBC QN AUTO: 29.3 PG (ref 27–31.3)
MCH RBC QN AUTO: ABNORMAL PG (ref 27–31.3)
MCHC RBC AUTO-ENTMCNC: 30.7 % (ref 33–37)
MCHC RBC AUTO-ENTMCNC: 31.1 % (ref 33–37)
MCHC RBC AUTO-ENTMCNC: 31.4 % (ref 33–37)
MCHC RBC AUTO-ENTMCNC: 31.8 % (ref 33–37)
MCHC RBC AUTO-ENTMCNC: 31.8 % (ref 33–37)
MCHC RBC AUTO-ENTMCNC: 31.9 % (ref 33–37)
MCHC RBC AUTO-ENTMCNC: 32 % (ref 33–37)
MCHC RBC AUTO-ENTMCNC: 32 % (ref 33–37)
MCHC RBC AUTO-ENTMCNC: 32.1 % (ref 33–37)
MCHC RBC AUTO-ENTMCNC: 32.3 % (ref 33–37)
MCHC RBC AUTO-ENTMCNC: 32.5 % (ref 33–37)
MCHC RBC AUTO-ENTMCNC: 32.9 % (ref 33–37)
MCHC RBC AUTO-ENTMCNC: 33 % (ref 32.2–35.5)
MCHC RBC AUTO-ENTMCNC: 33.1 % (ref 33–37)
MCHC RBC AUTO-ENTMCNC: ABNORMAL % (ref 33–37)
MCV RBC AUTO: 86.3 FL (ref 82–100)
MCV RBC AUTO: 86.5 FL (ref 79.4–94.8)
MCV RBC AUTO: 87.7 FL (ref 82–100)
MCV RBC AUTO: 88 FL (ref 82–100)
MCV RBC AUTO: 88 FL (ref 82–100)
MCV RBC AUTO: 88.6 FL (ref 82–100)
MCV RBC AUTO: 88.6 FL (ref 82–100)
MCV RBC AUTO: 88.7 FL (ref 82–100)
MCV RBC AUTO: 89.1 FL (ref 82–100)
MCV RBC AUTO: 89.7 FL (ref 82–100)
MCV RBC AUTO: 89.8 FL (ref 82–100)
MCV RBC AUTO: 90 FL (ref 82–100)
MCV RBC AUTO: 91 FL (ref 82–100)
MCV RBC AUTO: 91.4 FL (ref 82–100)
MCV RBC AUTO: ABNORMAL FL (ref 82–100)
METAMYELOCYTES RELATIVE PERCENT: 1 %
METAMYELOCYTES RELATIVE PERCENT: 1 %
METAMYELOCYTES RELATIVE PERCENT: 2 %
METAMYELOCYTES RELATIVE PERCENT: ABNORMAL %
MICROCYTES: ABNORMAL
MONOCYTES ABSOLUTE: 0 K/UL (ref 0.2–0.8)
MONOCYTES ABSOLUTE: 0.1 K/UL (ref 0.2–0.8)
MONOCYTES ABSOLUTE: 0.2 K/UL (ref 0.2–0.8)
MONOCYTES ABSOLUTE: 0.2 K/UL (ref 0.2–0.8)
MONOCYTES ABSOLUTE: 0.2 K/UL (ref 0.2–0.9)
MONOCYTES ABSOLUTE: 0.3 K/UL (ref 0.2–0.8)
MONOCYTES ABSOLUTE: 0.3 K/UL (ref 0.2–0.8)
MONOCYTES ABSOLUTE: 0.4 K/UL (ref 0.2–0.8)
MONOCYTES ABSOLUTE: 0.4 K/UL (ref 0.2–0.8)
MONOCYTES ABSOLUTE: 0.5 K/UL (ref 0.2–0.8)
MONOCYTES ABSOLUTE: 0.5 K/UL (ref 0.2–0.8)
MONOCYTES ABSOLUTE: 0.7 K/UL (ref 0.2–0.8)
MONOCYTES ABSOLUTE: 0.8 K/UL (ref 0.2–0.8)
MONOCYTES ABSOLUTE: 0.8 K/UL (ref 0.2–0.8)
MONOCYTES ABSOLUTE: ABNORMAL K/UL (ref 0.2–0.8)
MONOCYTES RELATIVE PERCENT: 0.9 %
MONOCYTES RELATIVE PERCENT: 1.1 %
MONOCYTES RELATIVE PERCENT: 1.7 %
MONOCYTES RELATIVE PERCENT: 1.9 %
MONOCYTES RELATIVE PERCENT: 2.2 %
MONOCYTES RELATIVE PERCENT: 2.5 %
MONOCYTES RELATIVE PERCENT: 2.7 %
MONOCYTES RELATIVE PERCENT: 2.8 %
MONOCYTES RELATIVE PERCENT: 2.9 %
MONOCYTES RELATIVE PERCENT: 3.1 % (ref 4.7–12.5)
MONOCYTES RELATIVE PERCENT: 3.8 %
MONOCYTES RELATIVE PERCENT: 4 %
MONOCYTES RELATIVE PERCENT: ABNORMAL %
MYELOCYTE PERCENT: 1 %
MYELOCYTE PERCENT: 3 %
MYELOCYTE PERCENT: ABNORMAL %
NEUTROPHILS ABSOLUTE: 11.1 K/UL (ref 1.4–6.5)
NEUTROPHILS ABSOLUTE: 14.7 K/UL (ref 1.4–6.5)
NEUTROPHILS ABSOLUTE: 16.7 K/UL (ref 1.4–6.5)
NEUTROPHILS ABSOLUTE: 16.8 K/UL (ref 1.4–6.5)
NEUTROPHILS ABSOLUTE: 16.8 K/UL (ref 1.4–6.5)
NEUTROPHILS ABSOLUTE: 17.2 K/UL (ref 1.4–6.5)
NEUTROPHILS ABSOLUTE: 17.9 K/UL (ref 1.4–6.5)
NEUTROPHILS ABSOLUTE: 18.2 K/UL (ref 1.4–6.5)
NEUTROPHILS ABSOLUTE: 18.8 K/UL (ref 1.4–6.5)
NEUTROPHILS ABSOLUTE: 19.1 K/UL (ref 1.4–6.5)
NEUTROPHILS ABSOLUTE: 4.9 K/UL (ref 1.4–6.5)
NEUTROPHILS ABSOLUTE: 5.5 K/UL (ref 1.6–6.1)
NEUTROPHILS ABSOLUTE: 8.6 K/UL (ref 1.4–6.5)
NEUTROPHILS ABSOLUTE: 9.8 K/UL (ref 1.4–6.5)
NEUTROPHILS ABSOLUTE: ABNORMAL K/UL (ref 1.4–6.5)
NEUTROPHILS RELATIVE PERCENT: 80 % (ref 34–71.1)
NEUTROPHILS RELATIVE PERCENT: 82 %
NEUTROPHILS RELATIVE PERCENT: 82 %
NEUTROPHILS RELATIVE PERCENT: 88 %
NEUTROPHILS RELATIVE PERCENT: 90 %
NEUTROPHILS RELATIVE PERCENT: 91 %
NEUTROPHILS RELATIVE PERCENT: 92.3 %
NEUTROPHILS RELATIVE PERCENT: 92.3 %
NEUTROPHILS RELATIVE PERCENT: 93.1 %
NEUTROPHILS RELATIVE PERCENT: 93.8 %
NEUTROPHILS RELATIVE PERCENT: 94 %
NEUTROPHILS RELATIVE PERCENT: 95 %
NEUTROPHILS RELATIVE PERCENT: 95.7 %
NEUTROPHILS RELATIVE PERCENT: 98 %
NEUTROPHILS RELATIVE PERCENT: ABNORMAL %
NITRITE, URINE: NEGATIVE
O2 SAT, ARTERIAL: 100 % (ref 93–100)
O2 SAT, ARTERIAL: 79 % (ref 93–100)
O2 SAT, ARTERIAL: 83 % (ref 93–100)
O2 SAT, ARTERIAL: 87 % (ref 93–100)
O2 SAT, ARTERIAL: 88 % (ref 93–100)
O2 SAT, ARTERIAL: 89 % (ref 93–100)
O2 SAT, ARTERIAL: 90 % (ref 93–100)
O2 SAT, ARTERIAL: 90 % (ref 93–100)
O2 SAT, ARTERIAL: 91 % (ref 93–100)
O2 SAT, ARTERIAL: 92 % (ref 93–100)
O2 SAT, ARTERIAL: 92 % (ref 93–100)
O2 SAT, ARTERIAL: 93 % (ref 93–100)
O2 SAT, ARTERIAL: 94 % (ref 93–100)
O2 SAT, ARTERIAL: 94 % (ref 93–100)
O2 SAT, ARTERIAL: 95 % (ref 93–100)
O2 SAT, ARTERIAL: 96 % (ref 93–100)
O2 SAT, ARTERIAL: 97 % (ref 93–100)
O2 SAT, ARTERIAL: 98 % (ref 93–100)
O2 SAT, ARTERIAL: 99 % (ref 93–100)
ORGANISM: ABNORMAL
PCO2 ARTERIAL: 110 MM HG (ref 35–45)
PCO2 ARTERIAL: 32 MM HG (ref 35–45)
PCO2 ARTERIAL: 44 MM HG (ref 35–45)
PCO2 ARTERIAL: 46 MM HG (ref 35–45)
PCO2 ARTERIAL: 47 MM HG (ref 35–45)
PCO2 ARTERIAL: 48 MM HG (ref 35–45)
PCO2 ARTERIAL: 48 MM HG (ref 35–45)
PCO2 ARTERIAL: 50 MM HG (ref 35–45)
PCO2 ARTERIAL: 50 MM HG (ref 35–45)
PCO2 ARTERIAL: 51 MM HG (ref 35–45)
PCO2 ARTERIAL: 51 MM HG (ref 35–45)
PCO2 ARTERIAL: 52 MM HG (ref 35–45)
PCO2 ARTERIAL: 54 MM HG (ref 35–45)
PCO2 ARTERIAL: 55 MM HG (ref 35–45)
PCO2 ARTERIAL: 55 MM HG (ref 35–45)
PCO2 ARTERIAL: 57 MM HG (ref 35–45)
PCO2 ARTERIAL: 57 MM HG (ref 35–45)
PCO2 ARTERIAL: 59 MM HG (ref 35–45)
PCO2 ARTERIAL: 59 MM HG (ref 35–45)
PCO2 ARTERIAL: 60 MM HG (ref 35–45)
PCO2 ARTERIAL: 61 MM HG (ref 35–45)
PCO2 ARTERIAL: 62 MM HG (ref 35–45)
PCO2 ARTERIAL: 64 MM HG (ref 35–45)
PCO2 ARTERIAL: 65 MM HG (ref 35–45)
PCO2 ARTERIAL: 66 MM HG (ref 35–45)
PCO2 ARTERIAL: 74 MM HG (ref 35–45)
PCO2 ARTERIAL: 76 MM HG (ref 35–45)
PCO2 ARTERIAL: 86 MM HG (ref 35–45)
PCO2 ARTERIAL: 95 MM HG (ref 35–45)
PDW BLD-RTO: 13 % (ref 11.7–14.4)
PDW BLD-RTO: 13.5 % (ref 11.5–14.5)
PDW BLD-RTO: 13.7 % (ref 11.5–14.5)
PDW BLD-RTO: 13.9 % (ref 11.5–14.5)
PDW BLD-RTO: 14 % (ref 11.5–14.5)
PDW BLD-RTO: 14.1 % (ref 11.5–14.5)
PDW BLD-RTO: 14.2 % (ref 11.5–14.5)
PDW BLD-RTO: 14.5 % (ref 11.5–14.5)
PDW BLD-RTO: 14.6 % (ref 11.5–14.5)
PDW BLD-RTO: 14.7 % (ref 11.5–14.5)
PDW BLD-RTO: 14.7 % (ref 11.5–14.5)
PDW BLD-RTO: 15 % (ref 11.5–14.5)
PDW BLD-RTO: ABNORMAL % (ref 11.5–14.5)
PERFORMED ON: ABNORMAL
PERFORMED ON: NORMAL
PH ARTERIAL: 6.98 (ref 7.35–7.45)
PH ARTERIAL: 7.04 (ref 7.35–7.45)
PH ARTERIAL: 7.06 (ref 7.35–7.45)
PH ARTERIAL: 7.09 (ref 7.35–7.45)
PH ARTERIAL: 7.1 (ref 7.35–7.45)
PH ARTERIAL: 7.11 (ref 7.35–7.45)
PH ARTERIAL: 7.13 (ref 7.35–7.45)
PH ARTERIAL: 7.14 (ref 7.35–7.45)
PH ARTERIAL: 7.15 (ref 7.35–7.45)
PH ARTERIAL: 7.17 (ref 7.35–7.45)
PH ARTERIAL: 7.18 (ref 7.35–7.45)
PH ARTERIAL: 7.19 (ref 7.35–7.45)
PH ARTERIAL: 7.21 (ref 7.35–7.45)
PH ARTERIAL: 7.21 (ref 7.35–7.45)
PH ARTERIAL: 7.23 (ref 7.35–7.45)
PH ARTERIAL: 7.24 (ref 7.35–7.45)
PH ARTERIAL: 7.25 (ref 7.35–7.45)
PH ARTERIAL: 7.25 (ref 7.35–7.45)
PH ARTERIAL: 7.26 (ref 7.35–7.45)
PH ARTERIAL: 7.27 (ref 7.35–7.45)
PH ARTERIAL: 7.28 (ref 7.35–7.45)
PH ARTERIAL: 7.29 (ref 7.35–7.45)
PH ARTERIAL: 7.29 (ref 7.35–7.45)
PH ARTERIAL: 7.3 (ref 7.35–7.45)
PH ARTERIAL: 7.31 (ref 7.35–7.45)
PH ARTERIAL: 7.45 (ref 7.35–7.45)
PH UA: 5 (ref 5–9)
PH UA: 5 (ref 5–9)
PH UA: 5.5 (ref 5–9)
PLATELET # BLD: 304 K/UL (ref 130–400)
PLATELET # BLD: 306 K/UL (ref 182–369)
PLATELET # BLD: 361 K/UL (ref 130–400)
PLATELET # BLD: 376 K/UL (ref 130–400)
PLATELET # BLD: 409 K/UL (ref 130–400)
PLATELET # BLD: 417 K/UL (ref 130–400)
PLATELET # BLD: 426 K/UL (ref 130–400)
PLATELET # BLD: 449 K/UL (ref 130–400)
PLATELET # BLD: 470 K/UL (ref 130–400)
PLATELET # BLD: 472 K/UL (ref 130–400)
PLATELET # BLD: 516 K/UL (ref 130–400)
PLATELET # BLD: 522 K/UL (ref 130–400)
PLATELET # BLD: 589 K/UL (ref 130–400)
PLATELET # BLD: 592 K/UL (ref 130–400)
PLATELET # BLD: ABNORMAL K/UL (ref 130–400)
PLATELET SLIDE REVIEW: ABNORMAL
PLATELET SLIDE REVIEW: ADEQUATE
PLATELET SLIDE REVIEW: NORMAL
PO2 ARTERIAL: 105 MM HG (ref 75–108)
PO2 ARTERIAL: 107 MM HG (ref 75–108)
PO2 ARTERIAL: 110 MM HG (ref 75–108)
PO2 ARTERIAL: 111 MM HG (ref 75–108)
PO2 ARTERIAL: 112 MM HG (ref 75–108)
PO2 ARTERIAL: 114 MM HG (ref 75–108)
PO2 ARTERIAL: 115 MM HG (ref 75–108)
PO2 ARTERIAL: 122 MM HG (ref 75–108)
PO2 ARTERIAL: 125 MM HG (ref 75–108)
PO2 ARTERIAL: 126 MM HG (ref 75–108)
PO2 ARTERIAL: 127 MM HG (ref 75–108)
PO2 ARTERIAL: 131 MM HG (ref 75–108)
PO2 ARTERIAL: 141 MM HG (ref 75–108)
PO2 ARTERIAL: 144 MM HG (ref 75–108)
PO2 ARTERIAL: 179 MM HG (ref 75–108)
PO2 ARTERIAL: 181 MM HG (ref 75–108)
PO2 ARTERIAL: 212 MM HG (ref 75–108)
PO2 ARTERIAL: 293 MM HG (ref 75–108)
PO2 ARTERIAL: 54 MM HG (ref 75–108)
PO2 ARTERIAL: 56 MM HG (ref 75–108)
PO2 ARTERIAL: 58 MM HG (ref 75–108)
PO2 ARTERIAL: 59 MM HG (ref 75–108)
PO2 ARTERIAL: 61 MM HG (ref 75–108)
PO2 ARTERIAL: 64 MM HG (ref 75–108)
PO2 ARTERIAL: 64 MM HG (ref 75–108)
PO2 ARTERIAL: 68 MM HG (ref 75–108)
PO2 ARTERIAL: 69 MM HG (ref 75–108)
PO2 ARTERIAL: 70 MM HG (ref 75–108)
PO2 ARTERIAL: 74 MM HG (ref 75–108)
PO2 ARTERIAL: 76 MM HG (ref 75–108)
PO2 ARTERIAL: 76 MM HG (ref 75–108)
PO2 ARTERIAL: 77 MM HG (ref 75–108)
PO2 ARTERIAL: 80 MM HG (ref 75–108)
PO2 ARTERIAL: 83 MM HG (ref 75–108)
PO2 ARTERIAL: 85 MM HG (ref 75–108)
POC CHLORIDE: 106 MEQ/L (ref 99–110)
POC CHLORIDE: 107 MEQ/L (ref 99–110)
POC CHLORIDE: 108 MEQ/L (ref 99–110)
POC CHLORIDE: 109 MEQ/L (ref 99–110)
POC CHLORIDE: 110 MEQ/L (ref 99–110)
POC CHLORIDE: 111 MEQ/L (ref 99–110)
POC CHLORIDE: 112 MEQ/L (ref 99–110)
POC CHLORIDE: 113 MEQ/L (ref 99–110)
POC CHLORIDE: 114 MEQ/L (ref 99–110)
POC CHLORIDE: 114 MEQ/L (ref 99–110)
POC CHLORIDE: 117 MEQ/L (ref 99–110)
POC CHLORIDE: 118 MEQ/L (ref 99–110)
POC CHLORIDE: 120 MEQ/L (ref 99–110)
POC CREATININE: 0.6 MG/DL (ref 0.6–1.2)
POC CREATININE: 0.7 MG/DL (ref 0.6–1.2)
POC CREATININE: 0.9 MG/DL (ref 0.6–1.2)
POC CREATININE: 1.1 MG/DL (ref 0.6–1.2)
POC CREATININE: 1.2 MG/DL (ref 0.6–1.2)
POC CREATININE: 1.3 MG/DL (ref 0.6–1.2)
POC CREATININE: 1.3 MG/DL (ref 0.6–1.2)
POC CREATININE: 2.2 MG/DL (ref 0.6–1.2)
POC CREATININE: 2.4 MG/DL (ref 0.6–1.2)
POC CREATININE: 2.4 MG/DL (ref 0.6–1.2)
POC CREATININE: 2.8 MG/DL (ref 0.6–1.2)
POC CREATININE: 3 MG/DL (ref 0.6–1.2)
POC CREATININE: 3.1 MG/DL (ref 0.6–1.2)
POC CREATININE: 3.1 MG/DL (ref 0.6–1.2)
POC CREATININE: 3.2 MG/DL (ref 0.6–1.2)
POC CREATININE: 3.3 MG/DL (ref 0.6–1.2)
POC CREATININE: 3.4 MG/DL (ref 0.6–1.2)
POC CREATININE: 3.5 MG/DL (ref 0.6–1.2)
POC CREATININE: 3.6 MG/DL (ref 0.6–1.2)
POC CREATININE: 3.6 MG/DL (ref 0.6–1.2)
POC FIO2: 100
POC FIO2: 50
POC FIO2: 50
POC FIO2: 60
POC FIO2: 70
POC FIO2: 80
POC FIO2: 90
POC HEMATOCRIT: 30 % (ref 36–48)
POC HEMATOCRIT: 31 % (ref 36–48)
POC HEMATOCRIT: 31 % (ref 36–48)
POC HEMATOCRIT: 32 % (ref 36–48)
POC HEMATOCRIT: 33 % (ref 36–48)
POC HEMATOCRIT: 34 % (ref 36–48)
POC HEMATOCRIT: 34 % (ref 36–48)
POC HEMATOCRIT: 35 % (ref 36–48)
POC HEMATOCRIT: 36 % (ref 36–48)
POC HEMATOCRIT: 37 % (ref 36–48)
POC HEMATOCRIT: 37 % (ref 36–48)
POC HEMATOCRIT: 38 % (ref 36–48)
POC HEMATOCRIT: 40 % (ref 36–48)
POC HEMATOCRIT: 40 % (ref 36–48)
POC HEMATOCRIT: 41 % (ref 36–48)
POC HEMATOCRIT: 41 % (ref 36–48)
POC HEMATOCRIT: 42 % (ref 36–48)
POC HEMATOCRIT: 43 % (ref 36–48)
POC HEMATOCRIT: 44 % (ref 36–48)
POC HEMATOCRIT: 46 % (ref 36–48)
POC POTASSIUM: 3.6 MEQ/L (ref 3.5–5.1)
POC POTASSIUM: 4 MEQ/L (ref 3.5–5.1)
POC POTASSIUM: 4.2 MEQ/L (ref 3.5–5.1)
POC POTASSIUM: 4.3 MEQ/L (ref 3.5–5.1)
POC POTASSIUM: 4.4 MEQ/L (ref 3.5–5.1)
POC POTASSIUM: 4.5 MEQ/L (ref 3.5–5.1)
POC POTASSIUM: 4.6 MEQ/L (ref 3.5–5.1)
POC POTASSIUM: 4.7 MEQ/L (ref 3.5–5.1)
POC POTASSIUM: 4.8 MEQ/L (ref 3.5–5.1)
POC POTASSIUM: 4.9 MEQ/L (ref 3.5–5.1)
POC POTASSIUM: 4.9 MEQ/L (ref 3.5–5.1)
POC POTASSIUM: 5.1 MEQ/L (ref 3.5–5.1)
POC POTASSIUM: 5.2 MEQ/L (ref 3.5–5.1)
POC POTASSIUM: 5.3 MEQ/L (ref 3.5–5.1)
POC POTASSIUM: 5.3 MEQ/L (ref 3.5–5.1)
POC POTASSIUM: 5.4 MEQ/L (ref 3.5–5.1)
POC POTASSIUM: 5.6 MEQ/L (ref 3.5–5.1)
POC POTASSIUM: 5.9 MEQ/L (ref 3.5–5.1)
POC POTASSIUM: 6.2 MEQ/L (ref 3.5–5.1)
POC SAMPLE TYPE: ABNORMAL
POC SODIUM: 134 MEQ/L (ref 136–145)
POC SODIUM: 138 MEQ/L (ref 136–145)
POC SODIUM: 139 MEQ/L (ref 136–145)
POC SODIUM: 139 MEQ/L (ref 136–145)
POC SODIUM: 140 MEQ/L (ref 136–145)
POC SODIUM: 141 MEQ/L (ref 136–145)
POC SODIUM: 142 MEQ/L (ref 136–145)
POC SODIUM: 143 MEQ/L (ref 136–145)
POC SODIUM: 144 MEQ/L (ref 136–145)
POC SODIUM: 145 MEQ/L (ref 136–145)
POC SODIUM: 146 MEQ/L (ref 136–145)
POC SODIUM: 147 MEQ/L (ref 136–145)
POC SODIUM: 147 MEQ/L (ref 136–145)
POTASSIUM SERPL-SCNC: 4.1 MEQ/L (ref 3.4–4.9)
POTASSIUM SERPL-SCNC: 4.1 MEQ/L (ref 3.4–4.9)
POTASSIUM SERPL-SCNC: 4.2 MEQ/L (ref 3.4–4.9)
POTASSIUM SERPL-SCNC: 4.3 MEQ/L (ref 3.4–4.9)
POTASSIUM SERPL-SCNC: 4.3 MEQ/L (ref 3.4–4.9)
POTASSIUM SERPL-SCNC: 4.7 MEQ/L (ref 3.4–4.9)
POTASSIUM SERPL-SCNC: 4.8 MEQ/L (ref 3.4–4.9)
POTASSIUM SERPL-SCNC: 4.8 MEQ/L (ref 3.4–4.9)
POTASSIUM SERPL-SCNC: 4.9 MEQ/L (ref 3.4–4.9)
POTASSIUM SERPL-SCNC: 4.9 MEQ/L (ref 3.4–4.9)
POTASSIUM SERPL-SCNC: 5 MEQ/L (ref 3.4–4.9)
POTASSIUM SERPL-SCNC: 5.1 MEQ/L (ref 3.4–4.9)
POTASSIUM SERPL-SCNC: 5.4 MEQ/L (ref 3.4–4.9)
POTASSIUM SERPL-SCNC: 5.7 MEQ/L (ref 3.4–4.9)
PROCALCITONIN: 0.03 NG/ML (ref 0–0.15)
PROCALCITONIN: 0.07 NG/ML (ref 0–0.15)
PROCALCITONIN: 1.22 NG/ML (ref 0–0.15)
PROCALCITONIN: 5.53 NG/ML (ref 0–0.15)
PROMYELOCYTES PERCENT: 1 %
PROMYELOCYTES PERCENT: 1 %
PROMYELOCYTES PERCENT: ABNORMAL %
PROTEIN UA: 100 MG/DL
PROTEIN UA: 30 MG/DL
PROTEIN UA: 30 MG/DL
PROTHROMBIN TIME: 13.2 SEC (ref 12.3–14.9)
RBC # BLD: 3.29 M/UL (ref 4.2–5.4)
RBC # BLD: 3.47 M/UL (ref 4.2–5.4)
RBC # BLD: 3.5 M/UL (ref 4.2–5.4)
RBC # BLD: 3.54 M/UL (ref 4.2–5.4)
RBC # BLD: 3.76 M/UL (ref 4.2–5.4)
RBC # BLD: 3.82 M/UL (ref 4.2–5.4)
RBC # BLD: 4.19 M/UL (ref 4.2–5.4)
RBC # BLD: 4.22 M/UL (ref 4.2–5.4)
RBC # BLD: 4.23 M/UL (ref 4.2–5.4)
RBC # BLD: 4.48 M/UL (ref 4.2–5.4)
RBC # BLD: 4.64 M/UL (ref 4.2–5.4)
RBC # BLD: 4.67 M/UL (ref 4.2–5.4)
RBC # BLD: 4.75 M/UL (ref 4.2–5.4)
RBC # BLD: 4.8 M/UL (ref 3.93–5.22)
RBC # BLD: ABNORMAL M/UL (ref 4.2–5.4)
RBC # BLD: NORMAL 10*6/UL
RBC UA: ABNORMAL /HPF (ref 0–2)
REASON FOR REJECTION: NORMAL
REJECTED TEST: NORMAL
ROULEAUX: ABNORMAL
SODIUM BLD-SCNC: 130 MEQ/L (ref 135–144)
SODIUM BLD-SCNC: 133 MEQ/L (ref 135–144)
SODIUM BLD-SCNC: 137 MEQ/L (ref 135–144)
SODIUM BLD-SCNC: 137 MEQ/L (ref 135–144)
SODIUM BLD-SCNC: 138 MEQ/L (ref 135–144)
SODIUM BLD-SCNC: 139 MEQ/L (ref 135–144)
SODIUM BLD-SCNC: 140 MEQ/L (ref 135–144)
SODIUM BLD-SCNC: 141 MEQ/L (ref 135–144)
SODIUM BLD-SCNC: 141 MEQ/L (ref 135–144)
SODIUM BLD-SCNC: 142 MEQ/L (ref 135–144)
SODIUM BLD-SCNC: 142 MEQ/L (ref 135–144)
SODIUM BLD-SCNC: 143 MEQ/L (ref 135–144)
SODIUM BLD-SCNC: 143 MEQ/L (ref 135–144)
SODIUM BLD-SCNC: 152 MEQ/L (ref 135–144)
SPECIFIC GRAVITY UA: 1.01 (ref 1–1.03)
SPECIFIC GRAVITY UA: 1.01 (ref 1–1.03)
SPECIFIC GRAVITY UA: 1.02 (ref 1–1.03)
TCO2 ARTERIAL: 19 (ref 22–29)
TCO2 ARTERIAL: 21 (ref 22–29)
TCO2 ARTERIAL: 22 (ref 22–29)
TCO2 ARTERIAL: 23 (ref 22–29)
TCO2 ARTERIAL: 24 (ref 22–29)
TCO2 ARTERIAL: 25 (ref 22–29)
TCO2 ARTERIAL: 26 (ref 22–29)
TCO2 ARTERIAL: 27 (ref 22–29)
TCO2 ARTERIAL: 27 (ref 22–29)
TCO2 ARTERIAL: 28 (ref 22–29)
TCO2 ARTERIAL: 29 (ref 22–29)
TCO2 ARTERIAL: 30 (ref 22–29)
TCO2 ARTERIAL: 31 (ref 22–29)
TOTAL PROTEIN: 5.6 G/DL (ref 6.3–8)
TOTAL PROTEIN: 5.7 G/DL (ref 6.3–8)
TOTAL PROTEIN: 5.8 G/DL (ref 6.3–8)
TOTAL PROTEIN: 6 G/DL (ref 6.3–8)
TOTAL PROTEIN: 6.1 G/DL (ref 6.3–8)
TOTAL PROTEIN: 6.2 G/DL (ref 6.3–8)
TOTAL PROTEIN: 6.5 G/DL (ref 6.3–8)
TOTAL PROTEIN: 6.5 G/DL (ref 6.3–8)
TOTAL PROTEIN: 6.7 G/DL (ref 6.3–8)
TOTAL PROTEIN: 6.9 G/DL (ref 6.3–8)
TOTAL PROTEIN: 7 G/DL (ref 6.3–8)
TOXIC GRANULATION: ABNORMAL
TROPONIN: <0.01 NG/ML (ref 0–0.01)
TROPONIN: <0.01 NG/ML (ref 0–0.01)
URINE CULTURE, ROUTINE: ABNORMAL
URINE REFLEX TO CULTURE: ABNORMAL
URINE REFLEX TO CULTURE: ABNORMAL
URINE REFLEX TO CULTURE: YES
UROBILINOGEN, URINE: 0.2 E.U./DL
UROBILINOGEN, URINE: 0.2 E.U./DL
UROBILINOGEN, URINE: 1 E.U./DL
WBC # BLD: 10 K/UL (ref 4.8–10.8)
WBC # BLD: 10.5 K/UL (ref 4.8–10.8)
WBC # BLD: 11.6 K/UL (ref 4.8–10.8)
WBC # BLD: 16 K/UL (ref 4.8–10.8)
WBC # BLD: 16.8 K/UL (ref 4.8–10.8)
WBC # BLD: 17 K/UL (ref 4.8–10.8)
WBC # BLD: 17.6 K/UL (ref 4.8–10.8)
WBC # BLD: 17.9 K/UL (ref 4.8–10.8)
WBC # BLD: 19.3 K/UL (ref 4.8–10.8)
WBC # BLD: 19.7 K/UL (ref 4.8–10.8)
WBC # BLD: 19.8 K/UL (ref 4.8–10.8)
WBC # BLD: 20.4 K/UL (ref 4.8–10.8)
WBC # BLD: 5.9 K/UL (ref 4.8–10.8)
WBC # BLD: 6.8 K/UL (ref 4–10)
WBC # BLD: ABNORMAL K/UL (ref 4.8–10.8)
WBC UA: ABNORMAL /HPF (ref 0–5)
WBC UA: ABNORMAL /HPF (ref 0–5)
WBC UA: NORMAL /HPF (ref 0–5)

## 2021-01-01 PROCEDURE — 2580000003 HC RX 258: Performed by: INTERNAL MEDICINE

## 2021-01-01 PROCEDURE — 6360000002 HC RX W HCPCS: Performed by: INTERNAL MEDICINE

## 2021-01-01 PROCEDURE — 84132 ASSAY OF SERUM POTASSIUM: CPT

## 2021-01-01 PROCEDURE — 2500000003 HC RX 250 WO HCPCS: Performed by: INTERNAL MEDICINE

## 2021-01-01 PROCEDURE — 2000000000 HC ICU R&B

## 2021-01-01 PROCEDURE — 36592 COLLECT BLOOD FROM PICC: CPT

## 2021-01-01 PROCEDURE — 82565 ASSAY OF CREATININE: CPT

## 2021-01-01 PROCEDURE — 2700000000 HC OXYGEN THERAPY PER DAY

## 2021-01-01 PROCEDURE — 99233 SBSQ HOSP IP/OBS HIGH 50: CPT | Performed by: INTERNAL MEDICINE

## 2021-01-01 PROCEDURE — 83615 LACTATE (LD) (LDH) ENZYME: CPT

## 2021-01-01 PROCEDURE — 84295 ASSAY OF SERUM SODIUM: CPT

## 2021-01-01 PROCEDURE — 6370000000 HC RX 637 (ALT 250 FOR IP): Performed by: INTERNAL MEDICINE

## 2021-01-01 PROCEDURE — 85025 COMPLETE CBC W/AUTO DIFF WBC: CPT

## 2021-01-01 PROCEDURE — 82330 ASSAY OF CALCIUM: CPT

## 2021-01-01 PROCEDURE — 99291 CRITICAL CARE FIRST HOUR: CPT | Performed by: INTERNAL MEDICINE

## 2021-01-01 PROCEDURE — 83605 ASSAY OF LACTIC ACID: CPT

## 2021-01-01 PROCEDURE — 82803 BLOOD GASES ANY COMBINATION: CPT

## 2021-01-01 PROCEDURE — 6370000000 HC RX 637 (ALT 250 FOR IP): Performed by: NURSE PRACTITIONER

## 2021-01-01 PROCEDURE — 99233 SBSQ HOSP IP/OBS HIGH 50: CPT | Performed by: NURSE PRACTITIONER

## 2021-01-01 PROCEDURE — 82948 REAGENT STRIP/BLOOD GLUCOSE: CPT

## 2021-01-01 PROCEDURE — 82435 ASSAY OF BLOOD CHLORIDE: CPT

## 2021-01-01 PROCEDURE — 87040 BLOOD CULTURE FOR BACTERIA: CPT

## 2021-01-01 PROCEDURE — 83735 ASSAY OF MAGNESIUM: CPT

## 2021-01-01 PROCEDURE — 94761 N-INVAS EAR/PLS OXIMETRY MLT: CPT

## 2021-01-01 PROCEDURE — 94660 CPAP INITIATION&MGMT: CPT

## 2021-01-01 PROCEDURE — 85014 HEMATOCRIT: CPT

## 2021-01-01 PROCEDURE — 85384 FIBRINOGEN ACTIVITY: CPT

## 2021-01-01 PROCEDURE — 80053 COMPREHEN METABOLIC PANEL: CPT

## 2021-01-01 PROCEDURE — 71045 X-RAY EXAM CHEST 1 VIEW: CPT

## 2021-01-01 PROCEDURE — 87186 SC STD MICRODIL/AGAR DIL: CPT

## 2021-01-01 PROCEDURE — 99222 1ST HOSP IP/OBS MODERATE 55: CPT | Performed by: INTERNAL MEDICINE

## 2021-01-01 PROCEDURE — 0BH17EZ INSERTION OF ENDOTRACHEAL AIRWAY INTO TRACHEA, VIA NATURAL OR ARTIFICIAL OPENING: ICD-10-PCS | Performed by: INTERNAL MEDICINE

## 2021-01-01 PROCEDURE — 6370000000 HC RX 637 (ALT 250 FOR IP): Performed by: EMERGENCY MEDICINE

## 2021-01-01 PROCEDURE — 36415 COLL VENOUS BLD VENIPUNCTURE: CPT

## 2021-01-01 PROCEDURE — 2580000003 HC RX 258

## 2021-01-01 PROCEDURE — 36600 WITHDRAWAL OF ARTERIAL BLOOD: CPT

## 2021-01-01 PROCEDURE — 36573 INSJ PICC RS&I 5 YR+: CPT

## 2021-01-01 PROCEDURE — 36556 INSERT NON-TUNNEL CV CATH: CPT

## 2021-01-01 PROCEDURE — 5A1955Z RESPIRATORY VENTILATION, GREATER THAN 96 CONSECUTIVE HOURS: ICD-10-PCS | Performed by: INTERNAL MEDICINE

## 2021-01-01 PROCEDURE — 37799 UNLISTED PX VASCULAR SURGERY: CPT

## 2021-01-01 PROCEDURE — 76937 US GUIDE VASCULAR ACCESS: CPT | Performed by: INTERNAL MEDICINE

## 2021-01-01 PROCEDURE — 81001 URINALYSIS AUTO W/SCOPE: CPT

## 2021-01-01 PROCEDURE — 87147 CULTURE TYPE IMMUNOLOGIC: CPT

## 2021-01-01 PROCEDURE — 94003 VENT MGMT INPAT SUBQ DAY: CPT

## 2021-01-01 PROCEDURE — 85379 FIBRIN DEGRADATION QUANT: CPT

## 2021-01-01 PROCEDURE — 0DH67UZ INSERTION OF FEEDING DEVICE INTO STOMACH, VIA NATURAL OR ARTIFICIAL OPENING: ICD-10-PCS | Performed by: INTERNAL MEDICINE

## 2021-01-01 PROCEDURE — 86140 C-REACTIVE PROTEIN: CPT

## 2021-01-01 PROCEDURE — 2580000003 HC RX 258: Performed by: NURSE PRACTITIONER

## 2021-01-01 PROCEDURE — XW033E5 INTRODUCTION OF REMDESIVIR ANTI-INFECTIVE INTO PERIPHERAL VEIN, PERCUTANEOUS APPROACH, NEW TECHNOLOGY GROUP 5: ICD-10-PCS | Performed by: INTERNAL MEDICINE

## 2021-01-01 PROCEDURE — 87070 CULTURE OTHR SPECIMN AEROBIC: CPT

## 2021-01-01 PROCEDURE — 1210000000 HC MED SURG R&B

## 2021-01-01 PROCEDURE — 36620 INSERTION CATHETER ARTERY: CPT

## 2021-01-01 PROCEDURE — 85730 THROMBOPLASTIN TIME PARTIAL: CPT

## 2021-01-01 PROCEDURE — 6360000004 HC RX CONTRAST MEDICATION: Performed by: INTERNAL MEDICINE

## 2021-01-01 PROCEDURE — 96366 THER/PROPH/DIAG IV INF ADDON: CPT

## 2021-01-01 PROCEDURE — 87086 URINE CULTURE/COLONY COUNT: CPT

## 2021-01-01 PROCEDURE — 84145 PROCALCITONIN (PCT): CPT

## 2021-01-01 PROCEDURE — 02HV33Z INSERTION OF INFUSION DEVICE INTO SUPERIOR VENA CAVA, PERCUTANEOUS APPROACH: ICD-10-PCS | Performed by: INTERNAL MEDICINE

## 2021-01-01 PROCEDURE — 87077 CULTURE AEROBIC IDENTIFY: CPT

## 2021-01-01 PROCEDURE — 84484 ASSAY OF TROPONIN QUANT: CPT

## 2021-01-01 PROCEDURE — 2500000003 HC RX 250 WO HCPCS: Performed by: NURSE PRACTITIONER

## 2021-01-01 PROCEDURE — 2580000003 HC RX 258: Performed by: EMERGENCY MEDICINE

## 2021-01-01 PROCEDURE — 76775 US EXAM ABDO BACK WALL LIM: CPT

## 2021-01-01 PROCEDURE — 96365 THER/PROPH/DIAG IV INF INIT: CPT

## 2021-01-01 PROCEDURE — 87205 SMEAR GRAM STAIN: CPT

## 2021-01-01 PROCEDURE — 6360000002 HC RX W HCPCS: Performed by: NURSE PRACTITIONER

## 2021-01-01 PROCEDURE — 94002 VENT MGMT INPAT INIT DAY: CPT

## 2021-01-01 PROCEDURE — 82728 ASSAY OF FERRITIN: CPT

## 2021-01-01 PROCEDURE — 51702 INSERT TEMP BLADDER CATH: CPT

## 2021-01-01 PROCEDURE — 99285 EMERGENCY DEPT VISIT HI MDM: CPT

## 2021-01-01 PROCEDURE — 36620 INSERTION CATHETER ARTERY: CPT | Performed by: INTERNAL MEDICINE

## 2021-01-01 PROCEDURE — 70450 CT HEAD/BRAIN W/O DYE: CPT

## 2021-01-01 PROCEDURE — 2709999900 IR PICC WO SQ PORT/PUMP > 5 YEARS

## 2021-01-01 PROCEDURE — 85610 PROTHROMBIN TIME: CPT

## 2021-01-01 PROCEDURE — 99223 1ST HOSP IP/OBS HIGH 75: CPT | Performed by: NURSE PRACTITIONER

## 2021-01-01 PROCEDURE — 6360000002 HC RX W HCPCS

## 2021-01-01 PROCEDURE — 99232 SBSQ HOSP IP/OBS MODERATE 35: CPT | Performed by: NURSE PRACTITIONER

## 2021-01-01 PROCEDURE — 83036 HEMOGLOBIN GLYCOSYLATED A1C: CPT

## 2021-01-01 PROCEDURE — 36556 INSERT NON-TUNNEL CV CATH: CPT | Performed by: INTERNAL MEDICINE

## 2021-01-01 PROCEDURE — 31500 INSERT EMERGENCY AIRWAY: CPT

## 2021-01-01 PROCEDURE — 71275 CT ANGIOGRAPHY CHEST: CPT

## 2021-01-01 PROCEDURE — 93010 ELECTROCARDIOGRAM REPORT: CPT | Performed by: INTERNAL MEDICINE

## 2021-01-01 PROCEDURE — 93005 ELECTROCARDIOGRAM TRACING: CPT

## 2021-01-01 PROCEDURE — 2500000003 HC RX 250 WO HCPCS: Performed by: EMERGENCY MEDICINE

## 2021-01-01 PROCEDURE — 76705 ECHO EXAM OF ABDOMEN: CPT

## 2021-01-01 PROCEDURE — 99223 1ST HOSP IP/OBS HIGH 75: CPT | Performed by: INTERNAL MEDICINE

## 2021-01-01 PROCEDURE — XW0DXM6 INTRODUCTION OF BARICITINIB INTO MOUTH AND PHARYNX, EXTERNAL APPROACH, NEW TECHNOLOGY GROUP 6: ICD-10-PCS | Performed by: INTERNAL MEDICINE

## 2021-01-01 RX ORDER — NICOTINE POLACRILEX 4 MG
15 LOZENGE BUCCAL PRN
Status: DISCONTINUED | OUTPATIENT
Start: 2021-01-01 | End: 2021-01-01 | Stop reason: HOSPADM

## 2021-01-01 RX ORDER — PROPOFOL 10 MG/ML
5-50 INJECTION, EMULSION INTRAVENOUS CONTINUOUS
Status: DISCONTINUED | OUTPATIENT
Start: 2021-01-01 | End: 2021-01-01 | Stop reason: SDUPTHER

## 2021-01-01 RX ORDER — LIDOCAINE HYDROCHLORIDE 20 MG/ML
5 INJECTION, SOLUTION INFILTRATION; PERINEURAL ONCE
Status: COMPLETED | OUTPATIENT
Start: 2021-01-01 | End: 2021-01-01

## 2021-01-01 RX ORDER — CISATRACURIUM BESYLATE 2 MG/ML
10 INJECTION, SOLUTION INTRAVENOUS ONCE
Status: COMPLETED | OUTPATIENT
Start: 2021-01-01 | End: 2021-01-01

## 2021-01-01 RX ORDER — ACETAMINOPHEN 80 MG
TABLET,CHEWABLE ORAL ONCE
Status: COMPLETED | OUTPATIENT
Start: 2021-01-01 | End: 2021-01-01

## 2021-01-01 RX ORDER — SODIUM CHLORIDE 0.9 % (FLUSH) 0.9 %
5-40 SYRINGE (ML) INJECTION PRN
Status: DISCONTINUED | OUTPATIENT
Start: 2021-01-01 | End: 2021-01-01 | Stop reason: HOSPADM

## 2021-01-01 RX ORDER — SODIUM CHLORIDE 9 MG/ML
INJECTION, SOLUTION INTRAVENOUS CONTINUOUS
Status: DISCONTINUED | OUTPATIENT
Start: 2021-01-01 | End: 2021-01-01

## 2021-01-01 RX ORDER — LORAZEPAM 2 MG/ML
0.5 INJECTION INTRAMUSCULAR
Status: DISCONTINUED | OUTPATIENT
Start: 2021-01-01 | End: 2021-01-01 | Stop reason: HOSPADM

## 2021-01-01 RX ORDER — SODIUM CHLORIDE 9 MG/ML
25 INJECTION, SOLUTION INTRAVENOUS PRN
Status: DISCONTINUED | OUTPATIENT
Start: 2021-01-01 | End: 2021-01-01 | Stop reason: HOSPADM

## 2021-01-01 RX ORDER — ETOMIDATE 2 MG/ML
0.3 INJECTION INTRAVENOUS ONCE
Status: COMPLETED | OUTPATIENT
Start: 2021-01-01 | End: 2021-01-01

## 2021-01-01 RX ORDER — SODIUM CHLORIDE 0.9 % (FLUSH) 0.9 %
5-40 SYRINGE (ML) INJECTION EVERY 12 HOURS SCHEDULED
Status: DISCONTINUED | OUTPATIENT
Start: 2021-01-01 | End: 2021-01-01

## 2021-01-01 RX ORDER — DEXTROSE MONOHYDRATE 50 MG/ML
100 INJECTION, SOLUTION INTRAVENOUS PRN
Status: DISCONTINUED | OUTPATIENT
Start: 2021-01-01 | End: 2021-01-01 | Stop reason: HOSPADM

## 2021-01-01 RX ORDER — CHLORHEXIDINE GLUCONATE 0.12 MG/ML
15 RINSE ORAL 2 TIMES DAILY
Status: DISCONTINUED | OUTPATIENT
Start: 2021-01-01 | End: 2021-01-01 | Stop reason: SDUPTHER

## 2021-01-01 RX ORDER — DEXTROSE MONOHYDRATE 25 G/50ML
12.5 INJECTION, SOLUTION INTRAVENOUS PRN
Status: DISCONTINUED | OUTPATIENT
Start: 2021-01-01 | End: 2021-01-01 | Stop reason: HOSPADM

## 2021-01-01 RX ORDER — POLYETHYLENE GLYCOL 3350 17 G/17G
17 POWDER, FOR SOLUTION ORAL DAILY
Status: DISCONTINUED | OUTPATIENT
Start: 2021-01-01 | End: 2021-01-01

## 2021-01-01 RX ORDER — LORAZEPAM 2 MG/ML
1 INJECTION INTRAMUSCULAR ONCE
Status: COMPLETED | OUTPATIENT
Start: 2021-01-01 | End: 2021-01-01

## 2021-01-01 RX ORDER — SODIUM CHLORIDE 9 MG/ML
250 INJECTION, SOLUTION INTRAVENOUS ONCE
Status: COMPLETED | OUTPATIENT
Start: 2021-01-01 | End: 2021-01-01

## 2021-01-01 RX ORDER — SUCCINYLCHOLINE/SOD CL,ISO/PF 100 MG/5ML
SYRINGE (ML) INTRAVENOUS
Status: COMPLETED
Start: 2021-01-01 | End: 2021-01-01

## 2021-01-01 RX ORDER — HEPARIN SODIUM 5000 [USP'U]/ML
5000 INJECTION, SOLUTION INTRAVENOUS; SUBCUTANEOUS EVERY 8 HOURS SCHEDULED
Status: DISCONTINUED | OUTPATIENT
Start: 2021-01-01 | End: 2021-01-01

## 2021-01-01 RX ORDER — LACTULOSE 10 G/15ML
20 SOLUTION ORAL 3 TIMES DAILY
Status: DISCONTINUED | OUTPATIENT
Start: 2021-01-01 | End: 2021-01-01

## 2021-01-01 RX ORDER — ACETAMINOPHEN 325 MG/1
650 TABLET ORAL EVERY 6 HOURS PRN
Status: DISCONTINUED | OUTPATIENT
Start: 2021-01-01 | End: 2021-01-01 | Stop reason: HOSPADM

## 2021-01-01 RX ORDER — SODIUM CHLORIDE 9 MG/ML
INJECTION, SOLUTION INTRAVENOUS
Status: COMPLETED
Start: 2021-01-01 | End: 2021-01-01

## 2021-01-01 RX ORDER — SODIUM CHLORIDE 9 MG/ML
INJECTION, SOLUTION INTRAVENOUS
Status: DISPENSED
Start: 2021-01-01 | End: 2021-01-01

## 2021-01-01 RX ORDER — SUCCINYLCHOLINE/SOD CL,ISO/PF 100 MG/5ML
0.6 SYRINGE (ML) INTRAVENOUS ONCE
Status: COMPLETED | OUTPATIENT
Start: 2021-01-01 | End: 2021-01-01

## 2021-01-01 RX ORDER — METOCLOPRAMIDE HYDROCHLORIDE 5 MG/ML
10 INJECTION INTRAMUSCULAR; INTRAVENOUS 3 TIMES DAILY
Status: DISCONTINUED | OUTPATIENT
Start: 2021-01-01 | End: 2021-01-01

## 2021-01-01 RX ORDER — ONDANSETRON 4 MG/1
4 TABLET, ORALLY DISINTEGRATING ORAL EVERY 8 HOURS PRN
Status: DISCONTINUED | OUTPATIENT
Start: 2021-01-01 | End: 2021-01-01 | Stop reason: HOSPADM

## 2021-01-01 RX ORDER — SODIUM CHLORIDE 450 MG/100ML
INJECTION, SOLUTION INTRAVENOUS CONTINUOUS
Status: DISCONTINUED | OUTPATIENT
Start: 2021-01-01 | End: 2021-01-01

## 2021-01-01 RX ORDER — CHLORHEXIDINE GLUCONATE 0.12 MG/ML
15 RINSE ORAL 2 TIMES DAILY
Status: DISCONTINUED | OUTPATIENT
Start: 2021-01-01 | End: 2021-01-01

## 2021-01-01 RX ORDER — MORPHINE SULFATE 2 MG/ML
2 INJECTION, SOLUTION INTRAMUSCULAR; INTRAVENOUS
Status: DISCONTINUED | OUTPATIENT
Start: 2021-01-01 | End: 2021-01-01 | Stop reason: HOSPADM

## 2021-01-01 RX ORDER — PROPOFOL 10 MG/ML
5-50 INJECTION, EMULSION INTRAVENOUS
Status: DISCONTINUED | OUTPATIENT
Start: 2021-01-01 | End: 2021-01-01

## 2021-01-01 RX ORDER — FUROSEMIDE 10 MG/ML
40 INJECTION INTRAMUSCULAR; INTRAVENOUS ONCE
Status: COMPLETED | OUTPATIENT
Start: 2021-01-01 | End: 2021-01-01

## 2021-01-01 RX ORDER — SENNOSIDES 8.8 MG/5ML
5 LIQUID ORAL 2 TIMES DAILY
Status: DISCONTINUED | OUTPATIENT
Start: 2021-01-01 | End: 2021-01-01

## 2021-01-01 RX ORDER — ONDANSETRON 2 MG/ML
4 INJECTION INTRAMUSCULAR; INTRAVENOUS EVERY 6 HOURS PRN
Status: DISCONTINUED | OUTPATIENT
Start: 2021-01-01 | End: 2021-01-01 | Stop reason: HOSPADM

## 2021-01-01 RX ORDER — FENTANYL CITRATE 50 UG/ML
50 INJECTION, SOLUTION INTRAMUSCULAR; INTRAVENOUS
Status: DISCONTINUED | OUTPATIENT
Start: 2021-01-01 | End: 2021-01-01 | Stop reason: HOSPADM

## 2021-01-01 RX ORDER — DEXAMETHASONE SODIUM PHOSPHATE 4 MG/ML
6 INJECTION, SOLUTION INTRA-ARTICULAR; INTRALESIONAL; INTRAMUSCULAR; INTRAVENOUS; SOFT TISSUE EVERY 24 HOURS
Status: COMPLETED | OUTPATIENT
Start: 2021-01-01 | End: 2021-01-01

## 2021-01-01 RX ORDER — ACETAMINOPHEN 500 MG
1000 TABLET ORAL ONCE
Status: COMPLETED | OUTPATIENT
Start: 2021-01-01 | End: 2021-01-01

## 2021-01-01 RX ORDER — SODIUM CHLORIDE 0.9 % (FLUSH) 0.9 %
3 SYRINGE (ML) INJECTION EVERY 8 HOURS
Status: DISCONTINUED | OUTPATIENT
Start: 2021-01-01 | End: 2021-01-01 | Stop reason: HOSPADM

## 2021-01-01 RX ORDER — INSULIN GLARGINE 100 [IU]/ML
8 INJECTION, SOLUTION SUBCUTANEOUS NIGHTLY
Status: DISCONTINUED | OUTPATIENT
Start: 2021-01-01 | End: 2021-01-01

## 2021-01-01 RX ORDER — IPRATROPIUM BROMIDE AND ALBUTEROL SULFATE 2.5; .5 MG/3ML; MG/3ML
1 SOLUTION RESPIRATORY (INHALATION)
Status: DISCONTINUED | OUTPATIENT
Start: 2021-01-01 | End: 2021-01-01

## 2021-01-01 RX ORDER — INSULIN GLARGINE 100 [IU]/ML
15 INJECTION, SOLUTION SUBCUTANEOUS NIGHTLY
Status: DISCONTINUED | OUTPATIENT
Start: 2021-01-01 | End: 2021-01-01

## 2021-01-01 RX ORDER — INSULIN GLARGINE 100 [IU]/ML
10 INJECTION, SOLUTION SUBCUTANEOUS NIGHTLY
Status: DISCONTINUED | OUTPATIENT
Start: 2021-01-01 | End: 2021-01-01

## 2021-01-01 RX ORDER — POLYETHYLENE GLYCOL 3350 17 G/17G
17 POWDER, FOR SOLUTION ORAL DAILY PRN
Status: DISCONTINUED | OUTPATIENT
Start: 2021-01-01 | End: 2021-01-01

## 2021-01-01 RX ORDER — SODIUM CHLORIDE 9 MG/ML
INJECTION, SOLUTION INTRAVENOUS CONTINUOUS
Status: DISPENSED | OUTPATIENT
Start: 2021-01-01 | End: 2021-01-01

## 2021-01-01 RX ORDER — ACETAMINOPHEN 650 MG/1
650 SUPPOSITORY RECTAL EVERY 6 HOURS PRN
Status: DISCONTINUED | OUTPATIENT
Start: 2021-01-01 | End: 2021-01-01 | Stop reason: HOSPADM

## 2021-01-01 RX ADMIN — FENTANYL CITRATE 200 MCG/HR: 50 INJECTION INTRAVENOUS at 15:24

## 2021-01-01 RX ADMIN — Medication 10 ML: at 20:31

## 2021-01-01 RX ADMIN — CISATRACURIUM BESYLATE 2.3 MCG/KG/MIN: 10 INJECTION, SOLUTION INTRAVENOUS at 08:07

## 2021-01-01 RX ADMIN — MEROPENEM 500 MG: 500 INJECTION, POWDER, FOR SOLUTION INTRAVENOUS at 15:37

## 2021-01-01 RX ADMIN — SODIUM ZIRCONIUM CYCLOSILICATE 10 G: 10 POWDER, FOR SUSPENSION ORAL at 10:22

## 2021-01-01 RX ADMIN — SODIUM CHLORIDE 0.2 MCG/KG/HR: 9 INJECTION, SOLUTION INTRAVENOUS at 15:30

## 2021-01-01 RX ADMIN — PROPOFOL 40 MCG/KG/MIN: 10 INJECTION, EMULSION INTRAVENOUS at 21:11

## 2021-01-01 RX ADMIN — INSULIN LISPRO 2 UNITS: 100 INJECTION, SOLUTION INTRAVENOUS; SUBCUTANEOUS at 22:00

## 2021-01-01 RX ADMIN — PROPOFOL 40 MCG/KG/MIN: 10 INJECTION, EMULSION INTRAVENOUS at 23:51

## 2021-01-01 RX ADMIN — SENNOSIDES 8.8 MG: 8.8 LIQUID ORAL at 20:30

## 2021-01-01 RX ADMIN — INSULIN LISPRO 2 UNITS: 100 INJECTION, SOLUTION INTRAVENOUS; SUBCUTANEOUS at 01:45

## 2021-01-01 RX ADMIN — POLYETHYLENE GLYCOL 3350 17 G: 17 POWDER, FOR SOLUTION ORAL at 08:08

## 2021-01-01 RX ADMIN — HEPARIN SODIUM 5000 UNITS: 5000 INJECTION INTRAVENOUS; SUBCUTANEOUS at 05:45

## 2021-01-01 RX ADMIN — INSULIN LISPRO 4 UNITS: 100 INJECTION, SOLUTION INTRAVENOUS; SUBCUTANEOUS at 21:36

## 2021-01-01 RX ADMIN — INSULIN LISPRO 4 UNITS: 100 INJECTION, SOLUTION INTRAVENOUS; SUBCUTANEOUS at 13:12

## 2021-01-01 RX ADMIN — CEFEPIME HYDROCHLORIDE 1000 MG: 1 INJECTION, POWDER, FOR SOLUTION INTRAMUSCULAR; INTRAVENOUS at 16:07

## 2021-01-01 RX ADMIN — INSULIN LISPRO 2 UNITS: 100 INJECTION, SOLUTION INTRAVENOUS; SUBCUTANEOUS at 13:44

## 2021-01-01 RX ADMIN — FENTANYL CITRATE 150 MCG/HR: 50 INJECTION INTRAVENOUS at 09:56

## 2021-01-01 RX ADMIN — PROPOFOL 40 MCG/KG/MIN: 10 INJECTION, EMULSION INTRAVENOUS at 13:57

## 2021-01-01 RX ADMIN — SODIUM CHLORIDE, PRESERVATIVE FREE 10 ML: 5 INJECTION INTRAVENOUS at 20:30

## 2021-01-01 RX ADMIN — SODIUM CHLORIDE 1.4 MCG/KG/HR: 9 INJECTION, SOLUTION INTRAVENOUS at 02:00

## 2021-01-01 RX ADMIN — ETOMIDATE 20 MG: 2 INJECTION, SOLUTION INTRAVENOUS at 17:11

## 2021-01-01 RX ADMIN — INSULIN LISPRO 2 UNITS: 100 INJECTION, SOLUTION INTRAVENOUS; SUBCUTANEOUS at 06:13

## 2021-01-01 RX ADMIN — FENTANYL CITRATE 150 MCG/HR: 50 INJECTION INTRAVENOUS at 13:53

## 2021-01-01 RX ADMIN — FENTANYL CITRATE 150 MCG/HR: 50 INJECTION INTRAVENOUS at 23:15

## 2021-01-01 RX ADMIN — ENOXAPARIN SODIUM 40 MG: 100 INJECTION SUBCUTANEOUS at 10:19

## 2021-01-01 RX ADMIN — Medication 10 ML: at 10:59

## 2021-01-01 RX ADMIN — POLYETHYLENE GLYCOL 3350 17 G: 17 POWDER, FOR SOLUTION ORAL at 07:53

## 2021-01-01 RX ADMIN — SODIUM CHLORIDE 1.4 MCG/KG/HR: 9 INJECTION, SOLUTION INTRAVENOUS at 18:49

## 2021-01-01 RX ADMIN — SODIUM BICARBONATE: 84 INJECTION, SOLUTION INTRAVENOUS at 12:30

## 2021-01-01 RX ADMIN — DEXAMETHASONE SODIUM PHOSPHATE 6 MG: 4 INJECTION, SOLUTION INTRA-ARTICULAR; INTRALESIONAL; INTRAMUSCULAR; INTRAVENOUS; SOFT TISSUE at 01:42

## 2021-01-01 RX ADMIN — CHLORHEXIDINE GLUCONATE 15 ML: 1.2 RINSE ORAL at 21:28

## 2021-01-01 RX ADMIN — DEXAMETHASONE SODIUM PHOSPHATE 6 MG: 4 INJECTION, SOLUTION INTRA-ARTICULAR; INTRALESIONAL; INTRAMUSCULAR; INTRAVENOUS; SOFT TISSUE at 00:36

## 2021-01-01 RX ADMIN — CHLORHEXIDINE GLUCONATE 15 ML: 1.2 RINSE ORAL at 21:30

## 2021-01-01 RX ADMIN — CHLORHEXIDINE GLUCONATE 15 ML: 1.2 RINSE ORAL at 08:24

## 2021-01-01 RX ADMIN — INSULIN LISPRO 2 UNITS: 100 INJECTION, SOLUTION INTRAVENOUS; SUBCUTANEOUS at 17:04

## 2021-01-01 RX ADMIN — SODIUM CHLORIDE 250 ML: 9 INJECTION, SOLUTION INTRAVENOUS at 15:47

## 2021-01-01 RX ADMIN — HEPARIN SODIUM 5000 UNITS: 5000 INJECTION INTRAVENOUS; SUBCUTANEOUS at 21:30

## 2021-01-01 RX ADMIN — MEROPENEM 500 MG: 500 INJECTION, POWDER, FOR SOLUTION INTRAVENOUS at 04:36

## 2021-01-01 RX ADMIN — SENNOSIDES 8.8 MG: 8.8 LIQUID ORAL at 20:58

## 2021-01-01 RX ADMIN — PROPOFOL 40 MCG/KG/MIN: 10 INJECTION, EMULSION INTRAVENOUS at 13:18

## 2021-01-01 RX ADMIN — CHLORHEXIDINE GLUCONATE 15 ML: 1.2 RINSE ORAL at 20:23

## 2021-01-01 RX ADMIN — PIPERACILLIN AND TAZOBACTAM 3375 MG: 3; .375 INJECTION, POWDER, LYOPHILIZED, FOR SOLUTION INTRAVENOUS at 16:39

## 2021-01-01 RX ADMIN — INSULIN LISPRO 2 UNITS: 100 INJECTION, SOLUTION INTRAVENOUS; SUBCUTANEOUS at 14:20

## 2021-01-01 RX ADMIN — HEPARIN SODIUM 5000 UNITS: 5000 INJECTION INTRAVENOUS; SUBCUTANEOUS at 18:17

## 2021-01-01 RX ADMIN — SODIUM CHLORIDE: 9 INJECTION, SOLUTION INTRAVENOUS at 16:59

## 2021-01-01 RX ADMIN — PROPOFOL 40 MCG/KG/MIN: 10 INJECTION, EMULSION INTRAVENOUS at 19:41

## 2021-01-01 RX ADMIN — LACTULOSE 20 G: 20 SOLUTION ORAL at 07:41

## 2021-01-01 RX ADMIN — Medication: at 12:00

## 2021-01-01 RX ADMIN — METOCLOPRAMIDE HYDROCHLORIDE 10 MG: 5 INJECTION INTRAMUSCULAR; INTRAVENOUS at 08:08

## 2021-01-01 RX ADMIN — INSULIN LISPRO 2 UNITS: 100 INJECTION, SOLUTION INTRAVENOUS; SUBCUTANEOUS at 13:53

## 2021-01-01 RX ADMIN — FENTANYL CITRATE 125 MCG/HR: 50 INJECTION INTRAVENOUS at 06:37

## 2021-01-01 RX ADMIN — CISATRACURIUM BESYLATE 2.4 MCG/KG/MIN: 10 INJECTION INTRAVENOUS at 03:15

## 2021-01-01 RX ADMIN — ENOXAPARIN SODIUM 40 MG: 100 INJECTION SUBCUTANEOUS at 10:50

## 2021-01-01 RX ADMIN — LACTULOSE 20 G: 20 SOLUTION ORAL at 21:30

## 2021-01-01 RX ADMIN — POLYETHYLENE GLYCOL 3350 17 G: 17 POWDER, FOR SOLUTION ORAL at 08:23

## 2021-01-01 RX ADMIN — METOCLOPRAMIDE HYDROCHLORIDE 10 MG: 5 INJECTION INTRAMUSCULAR; INTRAVENOUS at 14:19

## 2021-01-01 RX ADMIN — PROPOFOL 40 MCG/KG/MIN: 10 INJECTION, EMULSION INTRAVENOUS at 01:45

## 2021-01-01 RX ADMIN — FAMOTIDINE 20 MG: 10 INJECTION, SOLUTION INTRAVENOUS at 07:39

## 2021-01-01 RX ADMIN — Medication 10 ML: at 21:30

## 2021-01-01 RX ADMIN — MEROPENEM 500 MG: 500 INJECTION, POWDER, FOR SOLUTION INTRAVENOUS at 03:21

## 2021-01-01 RX ADMIN — INSULIN LISPRO 4 UNITS: 100 INJECTION, SOLUTION INTRAVENOUS; SUBCUTANEOUS at 05:39

## 2021-01-01 RX ADMIN — FAMOTIDINE 10 MG: 10 INJECTION, SOLUTION INTRAVENOUS at 09:49

## 2021-01-01 RX ADMIN — CISATRACURIUM BESYLATE 1.5 MCG/KG/MIN: 10 INJECTION INTRAVENOUS at 18:02

## 2021-01-01 RX ADMIN — SODIUM CHLORIDE 1.4 MCG/KG/HR: 9 INJECTION, SOLUTION INTRAVENOUS at 13:10

## 2021-01-01 RX ADMIN — FENTANYL CITRATE 150 MCG/HR: 50 INJECTION INTRAVENOUS at 12:32

## 2021-01-01 RX ADMIN — FENTANYL CITRATE 125 MCG/HR: 50 INJECTION INTRAVENOUS at 16:16

## 2021-01-01 RX ADMIN — Medication 10 MCG/MIN: at 09:46

## 2021-01-01 RX ADMIN — DEXAMETHASONE SODIUM PHOSPHATE 6 MG: 4 INJECTION, SOLUTION INTRA-ARTICULAR; INTRALESIONAL; INTRAMUSCULAR; INTRAVENOUS; SOFT TISSUE at 20:45

## 2021-01-01 RX ADMIN — SODIUM CHLORIDE 1.4 MCG/KG/HR: 9 INJECTION, SOLUTION INTRAVENOUS at 11:54

## 2021-01-01 RX ADMIN — INSULIN LISPRO 4 UNITS: 100 INJECTION, SOLUTION INTRAVENOUS; SUBCUTANEOUS at 05:55

## 2021-01-01 RX ADMIN — INSULIN LISPRO 2 UNITS: 100 INJECTION, SOLUTION INTRAVENOUS; SUBCUTANEOUS at 13:43

## 2021-01-01 RX ADMIN — ENOXAPARIN SODIUM 40 MG: 100 INJECTION SUBCUTANEOUS at 21:04

## 2021-01-01 RX ADMIN — CHLORHEXIDINE GLUCONATE 15 ML: 1.2 RINSE ORAL at 07:46

## 2021-01-01 RX ADMIN — SODIUM ZIRCONIUM CYCLOSILICATE 10 G: 10 POWDER, FOR SUSPENSION ORAL at 08:57

## 2021-01-01 RX ADMIN — MEROPENEM 500 MG: 500 INJECTION, POWDER, FOR SOLUTION INTRAVENOUS at 14:33

## 2021-01-01 RX ADMIN — PROPOFOL 50 MCG/KG/MIN: 10 INJECTION, EMULSION INTRAVENOUS at 00:52

## 2021-01-01 RX ADMIN — INSULIN LISPRO 2 UNITS: 100 INJECTION, SOLUTION INTRAVENOUS; SUBCUTANEOUS at 03:03

## 2021-01-01 RX ADMIN — SENNOSIDES 8.8 MG: 8.8 LIQUID ORAL at 08:08

## 2021-01-01 RX ADMIN — BARICITINIB 1 MG: 2 TABLET, FILM COATED ORAL at 07:46

## 2021-01-01 RX ADMIN — PROPOFOL 35 MCG/KG/MIN: 10 INJECTION, EMULSION INTRAVENOUS at 23:11

## 2021-01-01 RX ADMIN — BARICITINIB 1 MG: 2 TABLET, FILM COATED ORAL at 08:22

## 2021-01-01 RX ADMIN — CHLORHEXIDINE GLUCONATE 15 ML: 1.2 RINSE ORAL at 20:58

## 2021-01-01 RX ADMIN — FENTANYL CITRATE 150 MCG/HR: 50 INJECTION INTRAVENOUS at 03:30

## 2021-01-01 RX ADMIN — LIDOCAINE HYDROCHLORIDE 5 ML: 20 INJECTION, SOLUTION INFILTRATION; PERINEURAL at 15:46

## 2021-01-01 RX ADMIN — LACTULOSE 20 G: 20 SOLUTION ORAL at 14:27

## 2021-01-01 RX ADMIN — Medication 10 ML: at 07:42

## 2021-01-01 RX ADMIN — PROPOFOL 35 MCG/KG/MIN: 10 INJECTION, EMULSION INTRAVENOUS at 23:30

## 2021-01-01 RX ADMIN — Medication 10 ML: at 22:14

## 2021-01-01 RX ADMIN — PROPOFOL 50 MCG/KG/MIN: 10 INJECTION, EMULSION INTRAVENOUS at 09:39

## 2021-01-01 RX ADMIN — PROPOFOL 50 MCG/KG/MIN: 10 INJECTION, EMULSION INTRAVENOUS at 07:46

## 2021-01-01 RX ADMIN — HEPARIN SODIUM 5000 UNITS: 5000 INJECTION INTRAVENOUS; SUBCUTANEOUS at 13:41

## 2021-01-01 RX ADMIN — SODIUM CHLORIDE 1.4 MCG/KG/HR: 9 INJECTION, SOLUTION INTRAVENOUS at 21:19

## 2021-01-01 RX ADMIN — PROPOFOL 35 MCG/KG/MIN: 10 INJECTION, EMULSION INTRAVENOUS at 18:30

## 2021-01-01 RX ADMIN — REMDESIVIR 200 MG: 100 INJECTION, POWDER, LYOPHILIZED, FOR SOLUTION INTRAVENOUS at 05:35

## 2021-01-01 RX ADMIN — POLYETHYLENE GLYCOL 3350 17 G: 17 POWDER, FOR SOLUTION ORAL at 07:41

## 2021-01-01 RX ADMIN — PROPOFOL 30 MCG/KG/MIN: 10 INJECTION, EMULSION INTRAVENOUS at 05:37

## 2021-01-01 RX ADMIN — ENOXAPARIN SODIUM 40 MG: 100 INJECTION SUBCUTANEOUS at 20:54

## 2021-01-01 RX ADMIN — INSULIN LISPRO 2 UNITS: 100 INJECTION, SOLUTION INTRAVENOUS; SUBCUTANEOUS at 20:29

## 2021-01-01 RX ADMIN — SENNOSIDES 8.8 MG: 8.8 LIQUID ORAL at 10:50

## 2021-01-01 RX ADMIN — SENNOSIDES 8.8 MG: 8.8 LIQUID ORAL at 21:28

## 2021-01-01 RX ADMIN — HEPARIN SODIUM 5000 UNITS: 5000 INJECTION INTRAVENOUS; SUBCUTANEOUS at 06:24

## 2021-01-01 RX ADMIN — Medication 100 MG: at 17:13

## 2021-01-01 RX ADMIN — PROPOFOL 30 MCG/KG/MIN: 10 INJECTION, EMULSION INTRAVENOUS at 10:27

## 2021-01-01 RX ADMIN — FUROSEMIDE 40 MG: 10 INJECTION INTRAMUSCULAR; INTRAVENOUS at 11:02

## 2021-01-01 RX ADMIN — SENNOSIDES 8.8 MG: 8.8 LIQUID ORAL at 20:27

## 2021-01-01 RX ADMIN — Medication 10 MCG/MIN: at 08:06

## 2021-01-01 RX ADMIN — PROPOFOL 50 MCG/KG/MIN: 10 INJECTION, EMULSION INTRAVENOUS at 18:45

## 2021-01-01 RX ADMIN — BARICITINIB 4 MG: 2 TABLET, FILM COATED ORAL at 20:54

## 2021-01-01 RX ADMIN — HEPARIN SODIUM 5000 UNITS: 5000 INJECTION INTRAVENOUS; SUBCUTANEOUS at 14:27

## 2021-01-01 RX ADMIN — INSULIN LISPRO 2 UNITS: 100 INJECTION, SOLUTION INTRAVENOUS; SUBCUTANEOUS at 17:17

## 2021-01-01 RX ADMIN — INSULIN LISPRO 2 UNITS: 100 INJECTION, SOLUTION INTRAVENOUS; SUBCUTANEOUS at 13:11

## 2021-01-01 RX ADMIN — Medication 10 ML: at 07:54

## 2021-01-01 RX ADMIN — IOPAMIDOL 100 ML: 612 INJECTION, SOLUTION INTRAVENOUS at 19:03

## 2021-01-01 RX ADMIN — PROPOFOL 40 MCG/KG/MIN: 10 INJECTION, EMULSION INTRAVENOUS at 07:39

## 2021-01-01 RX ADMIN — INSULIN LISPRO 4 UNITS: 100 INJECTION, SOLUTION INTRAVENOUS; SUBCUTANEOUS at 10:22

## 2021-01-01 RX ADMIN — CISATRACURIUM BESYLATE 2 MCG/KG/MIN: 10 INJECTION INTRAVENOUS at 22:38

## 2021-01-01 RX ADMIN — FENTANYL CITRATE 100 MCG/HR: 50 INJECTION INTRAVENOUS at 22:42

## 2021-01-01 RX ADMIN — SODIUM CHLORIDE: 4.5 INJECTION, SOLUTION INTRAVENOUS at 09:40

## 2021-01-01 RX ADMIN — FENTANYL CITRATE 20 MCG/HR: 50 INJECTION INTRAVENOUS at 04:11

## 2021-01-01 RX ADMIN — PROPOFOL 40 MCG/KG/MIN: 10 INJECTION, EMULSION INTRAVENOUS at 04:14

## 2021-01-01 RX ADMIN — PROPOFOL 30 MCG/KG/MIN: 10 INJECTION, EMULSION INTRAVENOUS at 21:04

## 2021-01-01 RX ADMIN — ENOXAPARIN SODIUM 40 MG: 100 INJECTION SUBCUTANEOUS at 07:41

## 2021-01-01 RX ADMIN — FENTANYL CITRATE 200 MCG/HR: 50 INJECTION INTRAVENOUS at 15:30

## 2021-01-01 RX ADMIN — Medication 18 MCG/MIN: at 09:51

## 2021-01-01 RX ADMIN — FAMOTIDINE 10 MG: 10 INJECTION, SOLUTION INTRAVENOUS at 08:24

## 2021-01-01 RX ADMIN — CHLORHEXIDINE GLUCONATE 15 ML: 1.2 RINSE ORAL at 10:49

## 2021-01-01 RX ADMIN — PROPOFOL 35 MCG/KG/MIN: 10 INJECTION, EMULSION INTRAVENOUS at 22:45

## 2021-01-01 RX ADMIN — SENNOSIDES 8.8 MG: 8.8 LIQUID ORAL at 09:48

## 2021-01-01 RX ADMIN — LACTULOSE 20 G: 20 SOLUTION ORAL at 10:50

## 2021-01-01 RX ADMIN — REMDESIVIR 100 MG: 100 INJECTION, POWDER, LYOPHILIZED, FOR SOLUTION INTRAVENOUS at 01:10

## 2021-01-01 RX ADMIN — ENOXAPARIN SODIUM 40 MG: 100 INJECTION SUBCUTANEOUS at 09:25

## 2021-01-01 RX ADMIN — ACETAMINOPHEN 650 MG: 325 TABLET ORAL at 00:47

## 2021-01-01 RX ADMIN — CISATRACURIUM BESYLATE 0.5 MCG/KG/MIN: 10 INJECTION INTRAVENOUS at 17:54

## 2021-01-01 RX ADMIN — BARICITINIB 4 MG: 2 TABLET, FILM COATED ORAL at 00:55

## 2021-01-01 RX ADMIN — FAMOTIDINE 10 MG: 10 INJECTION, SOLUTION INTRAVENOUS at 08:23

## 2021-01-01 RX ADMIN — FAMOTIDINE 20 MG: 10 INJECTION, SOLUTION INTRAVENOUS at 08:57

## 2021-01-01 RX ADMIN — CISATRACURIUM BESYLATE 1 MCG/KG/MIN: 10 INJECTION, SOLUTION INTRAVENOUS at 15:29

## 2021-01-01 RX ADMIN — PROPOFOL 30 MCG/KG/MIN: 10 INJECTION, EMULSION INTRAVENOUS at 22:27

## 2021-01-01 RX ADMIN — ACETAMINOPHEN 1000 MG: 500 TABLET ORAL at 11:18

## 2021-01-01 RX ADMIN — SODIUM ZIRCONIUM CYCLOSILICATE 10 G: 10 POWDER, FOR SUSPENSION ORAL at 10:34

## 2021-01-01 RX ADMIN — SENNOSIDES 8.8 MG: 8.8 LIQUID ORAL at 22:14

## 2021-01-01 RX ADMIN — SODIUM BICARBONATE: 84 INJECTION, SOLUTION INTRAVENOUS at 09:54

## 2021-01-01 RX ADMIN — LACTULOSE 20 G: 20 SOLUTION ORAL at 13:50

## 2021-01-01 RX ADMIN — PROPOFOL 30 MCG/KG/MIN: 10 INJECTION, EMULSION INTRAVENOUS at 03:03

## 2021-01-01 RX ADMIN — SODIUM CHLORIDE: 9 INJECTION, SOLUTION INTRAVENOUS at 21:00

## 2021-01-01 RX ADMIN — CHLORHEXIDINE GLUCONATE 15 ML: 1.2 RINSE ORAL at 07:42

## 2021-01-01 RX ADMIN — PROPOFOL 30 MCG/KG/MIN: 10 INJECTION, EMULSION INTRAVENOUS at 08:12

## 2021-01-01 RX ADMIN — POLYETHYLENE GLYCOL 3350 17 G: 17 POWDER, FOR SOLUTION ORAL at 09:49

## 2021-01-01 RX ADMIN — Medication 10 ML: at 21:10

## 2021-01-01 RX ADMIN — LACTULOSE 20 G: 20 SOLUTION ORAL at 17:35

## 2021-01-01 RX ADMIN — INSULIN LISPRO 2 UNITS: 100 INJECTION, SOLUTION INTRAVENOUS; SUBCUTANEOUS at 17:52

## 2021-01-01 RX ADMIN — CHLORHEXIDINE GLUCONATE 15 ML: 1.2 RINSE ORAL at 20:27

## 2021-01-01 RX ADMIN — PROPOFOL 50 MCG/KG/MIN: 10 INJECTION, EMULSION INTRAVENOUS at 10:04

## 2021-01-01 RX ADMIN — PROPOFOL 30 MCG/KG/MIN: 10 INJECTION, EMULSION INTRAVENOUS at 14:39

## 2021-01-01 RX ADMIN — PROPOFOL 40 MCG/KG/MIN: 10 INJECTION, EMULSION INTRAVENOUS at 12:32

## 2021-01-01 RX ADMIN — SENNOSIDES 8.8 MG: 8.8 LIQUID ORAL at 20:23

## 2021-01-01 RX ADMIN — INSULIN LISPRO 2 UNITS: 100 INJECTION, SOLUTION INTRAVENOUS; SUBCUTANEOUS at 14:35

## 2021-01-01 RX ADMIN — INSULIN GLARGINE 15 UNITS: 100 INJECTION, SOLUTION SUBCUTANEOUS at 21:19

## 2021-01-01 RX ADMIN — POLYETHYLENE GLYCOL 3350 17 G: 17 POWDER, FOR SOLUTION ORAL at 10:50

## 2021-01-01 RX ADMIN — INSULIN LISPRO 2 UNITS: 100 INJECTION, SOLUTION INTRAVENOUS; SUBCUTANEOUS at 21:20

## 2021-01-01 RX ADMIN — CISATRACURIUM BESYLATE 10 MG: 10 INJECTION INTRAVENOUS at 17:53

## 2021-01-01 RX ADMIN — PROPOFOL 50 MCG/KG/MIN: 10 INJECTION, EMULSION INTRAVENOUS at 16:01

## 2021-01-01 RX ADMIN — FENTANYL CITRATE 150 MCG/HR: 50 INJECTION INTRAVENOUS at 08:54

## 2021-01-01 RX ADMIN — FENTANYL CITRATE 200 MCG/HR: 50 INJECTION INTRAVENOUS at 00:05

## 2021-01-01 RX ADMIN — FENTANYL CITRATE 100 MCG/HR: 50 INJECTION INTRAVENOUS at 17:06

## 2021-01-01 RX ADMIN — INSULIN LISPRO 2 UNITS: 100 INJECTION, SOLUTION INTRAVENOUS; SUBCUTANEOUS at 22:31

## 2021-01-01 RX ADMIN — DEXAMETHASONE SODIUM PHOSPHATE 6 MG: 4 INJECTION, SOLUTION INTRA-ARTICULAR; INTRALESIONAL; INTRAMUSCULAR; INTRAVENOUS; SOFT TISSUE at 01:09

## 2021-01-01 RX ADMIN — CHLORHEXIDINE GLUCONATE 15 ML: 1.2 RINSE ORAL at 08:36

## 2021-01-01 RX ADMIN — INSULIN LISPRO 4 UNITS: 100 INJECTION, SOLUTION INTRAVENOUS; SUBCUTANEOUS at 01:45

## 2021-01-01 RX ADMIN — PROPOFOL 50 MCG/KG/MIN: 10 INJECTION, EMULSION INTRAVENOUS at 09:50

## 2021-01-01 RX ADMIN — FENTANYL CITRATE 150 MCG/HR: 50 INJECTION INTRAVENOUS at 18:29

## 2021-01-01 RX ADMIN — POLYETHYLENE GLYCOL 3350 17 G: 17 POWDER, FOR SOLUTION ORAL at 08:37

## 2021-01-01 RX ADMIN — LACTULOSE 20 G: 20 SOLUTION ORAL at 13:44

## 2021-01-01 RX ADMIN — ENOXAPARIN SODIUM 40 MG: 100 INJECTION SUBCUTANEOUS at 10:43

## 2021-01-01 RX ADMIN — ENOXAPARIN SODIUM 40 MG: 100 INJECTION SUBCUTANEOUS at 21:28

## 2021-01-01 RX ADMIN — FENTANYL CITRATE 150 MCG/HR: 50 INJECTION INTRAVENOUS at 02:47

## 2021-01-01 RX ADMIN — PROPOFOL 35 MCG/KG/MIN: 10 INJECTION, EMULSION INTRAVENOUS at 17:12

## 2021-01-01 RX ADMIN — INSULIN LISPRO 2 UNITS: 100 INJECTION, SOLUTION INTRAVENOUS; SUBCUTANEOUS at 01:14

## 2021-01-01 RX ADMIN — DEXAMETHASONE SODIUM PHOSPHATE 6 MG: 4 INJECTION, SOLUTION INTRA-ARTICULAR; INTRALESIONAL; INTRAMUSCULAR; INTRAVENOUS; SOFT TISSUE at 01:46

## 2021-01-01 RX ADMIN — DEXAMETHASONE SODIUM PHOSPHATE 6 MG: 4 INJECTION, SOLUTION INTRA-ARTICULAR; INTRALESIONAL; INTRAMUSCULAR; INTRAVENOUS; SOFT TISSUE at 02:16

## 2021-01-01 RX ADMIN — CHLORHEXIDINE GLUCONATE 15 ML: 1.2 RINSE ORAL at 08:57

## 2021-01-01 RX ADMIN — PROPOFOL 50 MCG/KG/MIN: 10 INJECTION, EMULSION INTRAVENOUS at 13:10

## 2021-01-01 RX ADMIN — PIPERACILLIN AND TAZOBACTAM 3375 MG: 3; .375 INJECTION, POWDER, LYOPHILIZED, FOR SOLUTION INTRAVENOUS at 06:49

## 2021-01-01 RX ADMIN — SENNOSIDES 8.8 MG: 8.8 LIQUID ORAL at 21:30

## 2021-01-01 RX ADMIN — HEPARIN SODIUM 5000 UNITS: 5000 INJECTION INTRAVENOUS; SUBCUTANEOUS at 05:30

## 2021-01-01 RX ADMIN — Medication 10 ML: at 08:55

## 2021-01-01 RX ADMIN — SODIUM CHLORIDE, PRESERVATIVE FREE 10 ML: 5 INJECTION INTRAVENOUS at 08:08

## 2021-01-01 RX ADMIN — FENTANYL CITRATE 200 MCG/HR: 50 INJECTION INTRAVENOUS at 01:38

## 2021-01-01 RX ADMIN — SENNOSIDES 8.8 MG: 8.8 LIQUID ORAL at 11:02

## 2021-01-01 RX ADMIN — PROPOFOL 50 MCG/KG/MIN: 10 INJECTION, EMULSION INTRAVENOUS at 20:30

## 2021-01-01 RX ADMIN — SODIUM BICARBONATE: 84 INJECTION, SOLUTION INTRAVENOUS at 22:34

## 2021-01-01 RX ADMIN — DEXAMETHASONE SODIUM PHOSPHATE 6 MG: 4 INJECTION, SOLUTION INTRA-ARTICULAR; INTRALESIONAL; INTRAMUSCULAR; INTRAVENOUS; SOFT TISSUE at 00:55

## 2021-01-01 RX ADMIN — PROPOFOL 30 MCG/KG/MIN: 10 INJECTION, EMULSION INTRAVENOUS at 05:27

## 2021-01-01 RX ADMIN — INSULIN GLARGINE 10 UNITS: 100 INJECTION, SOLUTION SUBCUTANEOUS at 20:29

## 2021-01-01 RX ADMIN — FENTANYL CITRATE 200 MCG/HR: 50 INJECTION INTRAVENOUS at 06:50

## 2021-01-01 RX ADMIN — PROPOFOL 50 MCG/KG/MIN: 10 INJECTION, EMULSION INTRAVENOUS at 19:45

## 2021-01-01 RX ADMIN — LACTULOSE 20 G: 20 SOLUTION ORAL at 20:30

## 2021-01-01 RX ADMIN — FENTANYL CITRATE 150 MCG/HR: 50 INJECTION INTRAVENOUS at 23:39

## 2021-01-01 RX ADMIN — CHLORHEXIDINE GLUCONATE 15 ML: 1.2 RINSE ORAL at 07:39

## 2021-01-01 RX ADMIN — SODIUM CHLORIDE 1.4 MCG/KG/HR: 9 INJECTION, SOLUTION INTRAVENOUS at 13:16

## 2021-01-01 RX ADMIN — CISATRACURIUM BESYLATE 2.5 MCG/KG/MIN: 10 INJECTION INTRAVENOUS at 12:32

## 2021-01-01 RX ADMIN — LACTULOSE 20 G: 20 SOLUTION ORAL at 20:23

## 2021-01-01 RX ADMIN — Medication 8 MCG/MIN: at 17:08

## 2021-01-01 RX ADMIN — FENTANYL CITRATE 200 MCG/HR: 50 INJECTION INTRAVENOUS at 11:54

## 2021-01-01 RX ADMIN — PROPOFOL 50 MCG/KG/MIN: 10 INJECTION, EMULSION INTRAVENOUS at 06:10

## 2021-01-01 RX ADMIN — INSULIN LISPRO 2 UNITS: 100 INJECTION, SOLUTION INTRAVENOUS; SUBCUTANEOUS at 20:59

## 2021-01-01 RX ADMIN — PROPOFOL 35 MCG/KG/MIN: 10 INJECTION, EMULSION INTRAVENOUS at 13:50

## 2021-01-01 RX ADMIN — CHLORHEXIDINE GLUCONATE 15 ML: 1.2 RINSE ORAL at 09:48

## 2021-01-01 RX ADMIN — PIPERACILLIN AND TAZOBACTAM 3375 MG: 3; .375 INJECTION, POWDER, LYOPHILIZED, FOR SOLUTION INTRAVENOUS at 22:45

## 2021-01-01 RX ADMIN — Medication 10 ML: at 21:00

## 2021-01-01 RX ADMIN — SODIUM CHLORIDE, PRESERVATIVE FREE 10 ML: 5 INJECTION INTRAVENOUS at 21:00

## 2021-01-01 RX ADMIN — SENNOSIDES 8.8 MG: 8.8 LIQUID ORAL at 08:22

## 2021-01-01 RX ADMIN — FENTANYL CITRATE 200 MCG/HR: 50 INJECTION INTRAVENOUS at 16:34

## 2021-01-01 RX ADMIN — FENTANYL CITRATE 150 MCG/HR: 50 INJECTION INTRAVENOUS at 14:04

## 2021-01-01 RX ADMIN — PROPOFOL 30 MCG/KG/MIN: 10 INJECTION, EMULSION INTRAVENOUS at 16:28

## 2021-01-01 RX ADMIN — HEPARIN SODIUM 5000 UNITS: 5000 INJECTION INTRAVENOUS; SUBCUTANEOUS at 14:32

## 2021-01-01 RX ADMIN — CHLORHEXIDINE GLUCONATE 15 ML: 1.2 RINSE ORAL at 08:22

## 2021-01-01 RX ADMIN — INSULIN LISPRO 2 UNITS: 100 INJECTION, SOLUTION INTRAVENOUS; SUBCUTANEOUS at 08:58

## 2021-01-01 RX ADMIN — HEPARIN SODIUM 5000 UNITS: 5000 INJECTION INTRAVENOUS; SUBCUTANEOUS at 05:40

## 2021-01-01 RX ADMIN — PROPOFOL 50 MCG/KG/MIN: 10 INJECTION, EMULSION INTRAVENOUS at 22:21

## 2021-01-01 RX ADMIN — PROPOFOL 35 MCG/KG/MIN: 10 INJECTION, EMULSION INTRAVENOUS at 04:54

## 2021-01-01 RX ADMIN — Medication 10 ML: at 08:37

## 2021-01-01 RX ADMIN — SODIUM ZIRCONIUM CYCLOSILICATE 10 G: 10 POWDER, FOR SUSPENSION ORAL at 14:13

## 2021-01-01 RX ADMIN — PROPOFOL 50 MCG/KG/MIN: 10 INJECTION, EMULSION INTRAVENOUS at 12:34

## 2021-01-01 RX ADMIN — PROPOFOL 50 MCG/KG/MIN: 10 INJECTION, EMULSION INTRAVENOUS at 07:15

## 2021-01-01 RX ADMIN — FENTANYL CITRATE 20 MCG/HR: 50 INJECTION INTRAVENOUS at 09:50

## 2021-01-01 RX ADMIN — INSULIN LISPRO 4 UNITS: 100 INJECTION, SOLUTION INTRAVENOUS; SUBCUTANEOUS at 08:48

## 2021-01-01 RX ADMIN — FENTANYL CITRATE 150 MCG/HR: 50 INJECTION INTRAVENOUS at 07:05

## 2021-01-01 RX ADMIN — LACTULOSE 20 G: 20 SOLUTION ORAL at 09:48

## 2021-01-01 RX ADMIN — DOXYCYCLINE 100 MG: 100 INJECTION, POWDER, LYOPHILIZED, FOR SOLUTION INTRAVENOUS at 10:53

## 2021-01-01 RX ADMIN — INSULIN LISPRO 2 UNITS: 100 INJECTION, SOLUTION INTRAVENOUS; SUBCUTANEOUS at 01:58

## 2021-01-01 RX ADMIN — SODIUM ZIRCONIUM CYCLOSILICATE 10 G: 10 POWDER, FOR SUSPENSION ORAL at 17:40

## 2021-01-01 RX ADMIN — ENOXAPARIN SODIUM 40 MG: 100 INJECTION SUBCUTANEOUS at 20:26

## 2021-01-01 RX ADMIN — PROPOFOL 50 MCG/KG/MIN: 10 INJECTION, EMULSION INTRAVENOUS at 11:52

## 2021-01-01 RX ADMIN — CISATRACURIUM BESYLATE 1.5 MCG/KG/MIN: 10 INJECTION INTRAVENOUS at 17:54

## 2021-01-01 RX ADMIN — CHLORHEXIDINE GLUCONATE 15 ML: 1.2 RINSE ORAL at 08:08

## 2021-01-01 RX ADMIN — REMDESIVIR 100 MG: 100 INJECTION, POWDER, LYOPHILIZED, FOR SOLUTION INTRAVENOUS at 20:54

## 2021-01-01 RX ADMIN — FENTANYL CITRATE 150 MCG/HR: 50 INJECTION INTRAVENOUS at 20:29

## 2021-01-01 RX ADMIN — Medication 10 ML: at 20:58

## 2021-01-01 RX ADMIN — INSULIN LISPRO 2 UNITS: 100 INJECTION, SOLUTION INTRAVENOUS; SUBCUTANEOUS at 05:23

## 2021-01-01 RX ADMIN — PROPOFOL 30 MCG/KG/MIN: 10 INJECTION, EMULSION INTRAVENOUS at 15:07

## 2021-01-01 RX ADMIN — PROPOFOL 45 MCG/KG/MIN: 10 INJECTION, EMULSION INTRAVENOUS at 22:55

## 2021-01-01 RX ADMIN — INSULIN LISPRO 4 UNITS: 100 INJECTION, SOLUTION INTRAVENOUS; SUBCUTANEOUS at 12:56

## 2021-01-01 RX ADMIN — FAMOTIDINE 10 MG: 10 INJECTION, SOLUTION INTRAVENOUS at 07:46

## 2021-01-01 RX ADMIN — Medication 10 ML: at 21:27

## 2021-01-01 RX ADMIN — INSULIN LISPRO 2 UNITS: 100 INJECTION, SOLUTION INTRAVENOUS; SUBCUTANEOUS at 11:06

## 2021-01-01 RX ADMIN — FAMOTIDINE 20 MG: 10 INJECTION, SOLUTION INTRAVENOUS at 10:50

## 2021-01-01 RX ADMIN — PROPOFOL 35 MCG/KG/MIN: 10 INJECTION, EMULSION INTRAVENOUS at 18:04

## 2021-01-01 RX ADMIN — FENTANYL CITRATE 200 MCG/HR: 50 INJECTION INTRAVENOUS at 10:04

## 2021-01-01 RX ADMIN — CISATRACURIUM BESYLATE 10 MG: 10 INJECTION INTRAVENOUS at 19:38

## 2021-01-01 RX ADMIN — PROPOFOL 40 MCG/KG/MIN: 10 INJECTION, EMULSION INTRAVENOUS at 08:36

## 2021-01-01 RX ADMIN — MEROPENEM 1000 MG: 1 INJECTION, POWDER, FOR SOLUTION INTRAVENOUS at 17:00

## 2021-01-01 RX ADMIN — FENTANYL CITRATE 150 MCG/HR: 50 INJECTION INTRAVENOUS at 00:45

## 2021-01-01 RX ADMIN — SODIUM CHLORIDE 1.4 MCG/KG/HR: 9 INJECTION, SOLUTION INTRAVENOUS at 04:30

## 2021-01-01 RX ADMIN — Medication 10 ML: at 22:25

## 2021-01-01 RX ADMIN — BARICITINIB 1 MG: 2 TABLET, FILM COATED ORAL at 11:58

## 2021-01-01 RX ADMIN — Medication 10 ML: at 21:01

## 2021-01-01 RX ADMIN — ENOXAPARIN SODIUM 40 MG: 100 INJECTION SUBCUTANEOUS at 21:00

## 2021-01-01 RX ADMIN — PROPOFOL 50 MCG/KG/MIN: 10 INJECTION, EMULSION INTRAVENOUS at 01:07

## 2021-01-01 RX ADMIN — REMDESIVIR 100 MG: 100 INJECTION, POWDER, LYOPHILIZED, FOR SOLUTION INTRAVENOUS at 21:05

## 2021-01-01 RX ADMIN — FENTANYL CITRATE 200 MCG/HR: 50 INJECTION INTRAVENOUS at 04:03

## 2021-01-01 RX ADMIN — SENNOSIDES 8.8 MG: 8.8 LIQUID ORAL at 08:36

## 2021-01-01 RX ADMIN — PROPOFOL 50 MCG/KG/MIN: 10 INJECTION, EMULSION INTRAVENOUS at 23:17

## 2021-01-01 RX ADMIN — MEROPENEM 500 MG: 500 INJECTION, POWDER, FOR SOLUTION INTRAVENOUS at 04:07

## 2021-01-01 RX ADMIN — CISATRACURIUM BESYLATE 2 MCG/KG/MIN: 10 INJECTION, SOLUTION INTRAVENOUS at 19:39

## 2021-01-01 RX ADMIN — PROPOFOL 35 MCG/KG/MIN: 10 INJECTION, EMULSION INTRAVENOUS at 09:16

## 2021-01-01 RX ADMIN — POLYETHYLENE GLYCOL 3350 17 G: 17 POWDER, FOR SOLUTION ORAL at 07:39

## 2021-01-01 RX ADMIN — FENTANYL CITRATE 150 MCG/HR: 50 INJECTION INTRAVENOUS at 08:06

## 2021-01-01 RX ADMIN — INSULIN GLARGINE 15 UNITS: 100 INJECTION, SOLUTION SUBCUTANEOUS at 22:22

## 2021-01-01 RX ADMIN — FAMOTIDINE 10 MG: 10 INJECTION, SOLUTION INTRAVENOUS at 07:42

## 2021-01-01 RX ADMIN — Medication 10 ML: at 22:28

## 2021-01-01 RX ADMIN — MEROPENEM 500 MG: 500 INJECTION, POWDER, FOR SOLUTION INTRAVENOUS at 16:01

## 2021-01-01 RX ADMIN — INSULIN GLARGINE 10 UNITS: 100 INJECTION, SOLUTION SUBCUTANEOUS at 20:28

## 2021-01-01 RX ADMIN — DEXAMETHASONE SODIUM PHOSPHATE 6 MG: 4 INJECTION, SOLUTION INTRA-ARTICULAR; INTRALESIONAL; INTRAMUSCULAR; INTRAVENOUS; SOFT TISSUE at 20:53

## 2021-01-01 RX ADMIN — CEFEPIME HYDROCHLORIDE 1000 MG: 1 INJECTION, POWDER, FOR SOLUTION INTRAMUSCULAR; INTRAVENOUS at 17:53

## 2021-01-01 RX ADMIN — CISATRACURIUM BESYLATE 1 MCG/KG/MIN: 10 INJECTION INTRAVENOUS at 17:11

## 2021-01-01 RX ADMIN — LACTULOSE 20 G: 20 SOLUTION ORAL at 20:27

## 2021-01-01 RX ADMIN — INSULIN GLARGINE 8 UNITS: 100 INJECTION, SOLUTION SUBCUTANEOUS at 20:32

## 2021-01-01 RX ADMIN — HEPARIN SODIUM 5000 UNITS: 5000 INJECTION INTRAVENOUS; SUBCUTANEOUS at 22:13

## 2021-01-01 RX ADMIN — Medication 10 ML: at 09:48

## 2021-01-01 RX ADMIN — FENTANYL CITRATE 200 MCG/HR: 50 INJECTION INTRAVENOUS at 18:50

## 2021-01-01 RX ADMIN — PROPOFOL 40 MCG/KG/MIN: 10 INJECTION, EMULSION INTRAVENOUS at 03:54

## 2021-01-01 RX ADMIN — DEXAMETHASONE SODIUM PHOSPHATE 6 MG: 4 INJECTION, SOLUTION INTRA-ARTICULAR; INTRALESIONAL; INTRAMUSCULAR; INTRAVENOUS; SOFT TISSUE at 01:56

## 2021-01-01 RX ADMIN — FENTANYL CITRATE 150 MCG/HR: 50 INJECTION INTRAVENOUS at 17:53

## 2021-01-01 RX ADMIN — FAMOTIDINE 20 MG: 10 INJECTION, SOLUTION INTRAVENOUS at 21:05

## 2021-01-01 RX ADMIN — CHLORHEXIDINE GLUCONATE 15 ML: 1.2 RINSE ORAL at 21:04

## 2021-01-01 RX ADMIN — FENTANYL CITRATE 20 MCG/HR: 50 INJECTION INTRAVENOUS at 23:57

## 2021-01-01 RX ADMIN — Medication 10 ML: at 09:26

## 2021-01-01 RX ADMIN — Medication 8 MCG/MIN: at 04:10

## 2021-01-01 RX ADMIN — CEFEPIME HYDROCHLORIDE 1000 MG: 1 INJECTION, POWDER, FOR SOLUTION INTRAMUSCULAR; INTRAVENOUS at 16:35

## 2021-01-01 RX ADMIN — CISATRACURIUM BESYLATE 2 MCG/KG/MIN: 10 INJECTION INTRAVENOUS at 10:21

## 2021-01-01 RX ADMIN — Medication 10 ML: at 20:23

## 2021-01-01 RX ADMIN — METOCLOPRAMIDE HYDROCHLORIDE 10 MG: 5 INJECTION INTRAMUSCULAR; INTRAVENOUS at 16:57

## 2021-01-01 RX ADMIN — PROPOFOL 35 MCG/KG/MIN: 10 INJECTION, EMULSION INTRAVENOUS at 02:47

## 2021-01-01 RX ADMIN — INSULIN LISPRO 2 UNITS: 100 INJECTION, SOLUTION INTRAVENOUS; SUBCUTANEOUS at 18:17

## 2021-01-01 RX ADMIN — FAMOTIDINE 10 MG: 10 INJECTION, SOLUTION INTRAVENOUS at 08:36

## 2021-01-01 RX ADMIN — LACTULOSE 20 G: 20 SOLUTION ORAL at 08:22

## 2021-01-01 RX ADMIN — DEXAMETHASONE SODIUM PHOSPHATE 6 MG: 4 INJECTION, SOLUTION INTRA-ARTICULAR; INTRALESIONAL; INTRAMUSCULAR; INTRAVENOUS; SOFT TISSUE at 00:41

## 2021-01-01 RX ADMIN — ENOXAPARIN SODIUM 40 MG: 100 INJECTION SUBCUTANEOUS at 22:27

## 2021-01-01 RX ADMIN — INSULIN LISPRO 2 UNITS: 100 INJECTION, SOLUTION INTRAVENOUS; SUBCUTANEOUS at 05:44

## 2021-01-01 RX ADMIN — PROPOFOL 50 MCG/KG/MIN: 10 INJECTION, EMULSION INTRAVENOUS at 03:21

## 2021-01-01 RX ADMIN — PROPOFOL 50 MCG/KG/MIN: 10 INJECTION, EMULSION INTRAVENOUS at 02:56

## 2021-01-01 RX ADMIN — FENTANYL CITRATE 200 MCG/HR: 50 INJECTION INTRAVENOUS at 13:42

## 2021-01-01 RX ADMIN — PROPOFOL 35 MCG/KG/MIN: 10 INJECTION, EMULSION INTRAVENOUS at 03:39

## 2021-01-01 RX ADMIN — INSULIN GLARGINE 10 UNITS: 100 INJECTION, SOLUTION SUBCUTANEOUS at 20:59

## 2021-01-01 RX ADMIN — FENTANYL CITRATE 175 MCG/HR: 50 INJECTION INTRAVENOUS at 22:30

## 2021-01-01 RX ADMIN — Medication 8 MCG/MIN: at 04:42

## 2021-01-01 RX ADMIN — FENTANYL CITRATE 150 MCG/HR: 50 INJECTION INTRAVENOUS at 05:40

## 2021-01-01 RX ADMIN — INSULIN LISPRO 2 UNITS: 100 INJECTION, SOLUTION INTRAVENOUS; SUBCUTANEOUS at 01:46

## 2021-01-01 RX ADMIN — SODIUM CHLORIDE 0.4 MCG/KG/HR: 9 INJECTION, SOLUTION INTRAVENOUS at 15:14

## 2021-01-01 RX ADMIN — CHLORHEXIDINE GLUCONATE 15 ML: 1.2 RINSE ORAL at 20:30

## 2021-01-01 RX ADMIN — Medication 10 ML: at 20:28

## 2021-01-01 RX ADMIN — FAMOTIDINE 10 MG: 10 INJECTION, SOLUTION INTRAVENOUS at 08:07

## 2021-01-01 RX ADMIN — FENTANYL CITRATE 150 MCG/HR: 50 INJECTION INTRAVENOUS at 17:55

## 2021-01-01 RX ADMIN — FAMOTIDINE 20 MG: 10 INJECTION, SOLUTION INTRAVENOUS at 10:18

## 2021-01-01 RX ADMIN — LACTULOSE 20 G: 20 SOLUTION ORAL at 07:46

## 2021-01-01 RX ADMIN — SODIUM CHLORIDE: 9 INJECTION, SOLUTION INTRAVENOUS at 13:25

## 2021-01-01 RX ADMIN — CHLORHEXIDINE GLUCONATE 15 ML: 1.2 RINSE ORAL at 22:28

## 2021-01-01 RX ADMIN — FENTANYL CITRATE 200 MCG/HR: 50 INJECTION INTRAVENOUS at 22:31

## 2021-01-01 RX ADMIN — INSULIN LISPRO 2 UNITS: 100 INJECTION, SOLUTION INTRAVENOUS; SUBCUTANEOUS at 17:35

## 2021-01-01 RX ADMIN — LORAZEPAM 1 MG: 2 INJECTION INTRAMUSCULAR; INTRAVENOUS at 20:54

## 2021-01-01 RX ADMIN — INSULIN GLARGINE 8 UNITS: 100 INJECTION, SOLUTION SUBCUTANEOUS at 22:41

## 2021-01-01 RX ADMIN — PROPOFOL 50 MCG/KG/MIN: 10 INJECTION, EMULSION INTRAVENOUS at 10:45

## 2021-01-01 RX ADMIN — INSULIN LISPRO 4 UNITS: 100 INJECTION, SOLUTION INTRAVENOUS; SUBCUTANEOUS at 09:51

## 2021-01-01 RX ADMIN — FENTANYL CITRATE 150 MCG/HR: 50 INJECTION INTRAVENOUS at 11:01

## 2021-01-01 RX ADMIN — ENOXAPARIN SODIUM 40 MG: 100 INJECTION SUBCUTANEOUS at 09:48

## 2021-01-01 RX ADMIN — PROPOFOL 50 MCG/KG/MIN: 10 INJECTION, EMULSION INTRAVENOUS at 16:16

## 2021-01-01 RX ADMIN — Medication 10 ML: at 08:08

## 2021-01-01 RX ADMIN — PROPOFOL 50 MCG/KG/MIN: 10 INJECTION, EMULSION INTRAVENOUS at 04:33

## 2021-01-01 RX ADMIN — INSULIN LISPRO 2 UNITS: 100 INJECTION, SOLUTION INTRAVENOUS; SUBCUTANEOUS at 10:20

## 2021-01-01 RX ADMIN — PROPOFOL 40 MCG/KG/MIN: 10 INJECTION, EMULSION INTRAVENOUS at 00:23

## 2021-01-01 RX ADMIN — PROPOFOL 50 MCG/KG/MIN: 10 INJECTION, EMULSION INTRAVENOUS at 00:04

## 2021-01-01 RX ADMIN — INSULIN LISPRO 2 UNITS: 100 INJECTION, SOLUTION INTRAVENOUS; SUBCUTANEOUS at 14:00

## 2021-01-01 RX ADMIN — SODIUM ZIRCONIUM CYCLOSILICATE 10 G: 10 POWDER, FOR SUSPENSION ORAL at 21:30

## 2021-01-01 RX ADMIN — CHLORHEXIDINE GLUCONATE 15 ML: 1.2 RINSE ORAL at 22:11

## 2021-01-01 RX ADMIN — INSULIN LISPRO 2 UNITS: 100 INJECTION, SOLUTION INTRAVENOUS; SUBCUTANEOUS at 20:27

## 2021-01-01 RX ADMIN — FENTANYL CITRATE 150 MCG/HR: 50 INJECTION INTRAVENOUS at 23:10

## 2021-01-01 RX ADMIN — HEPARIN SODIUM 5000 UNITS: 5000 INJECTION INTRAVENOUS; SUBCUTANEOUS at 14:19

## 2021-01-01 RX ADMIN — FENTANYL CITRATE 125 MCG/HR: 50 INJECTION INTRAVENOUS at 15:40

## 2021-01-01 RX ADMIN — SENNOSIDES 8.8 MG: 8.8 LIQUID ORAL at 07:46

## 2021-01-01 RX ADMIN — Medication 10 ML: at 08:30

## 2021-01-01 RX ADMIN — FENTANYL CITRATE 175 MCG/HR: 50 INJECTION INTRAVENOUS at 06:15

## 2021-01-01 RX ADMIN — SODIUM CHLORIDE 1.4 MCG/KG/HR: 9 INJECTION, SOLUTION INTRAVENOUS at 04:55

## 2021-01-01 RX ADMIN — PROPOFOL 40 MCG/KG/MIN: 10 INJECTION, EMULSION INTRAVENOUS at 17:11

## 2021-01-01 RX ADMIN — ENOXAPARIN SODIUM 40 MG: 100 INJECTION SUBCUTANEOUS at 08:56

## 2021-01-01 RX ADMIN — METOCLOPRAMIDE HYDROCHLORIDE 10 MG: 5 INJECTION INTRAMUSCULAR; INTRAVENOUS at 20:30

## 2021-01-01 RX ADMIN — HEPARIN SODIUM 5000 UNITS: 5000 INJECTION INTRAVENOUS; SUBCUTANEOUS at 21:56

## 2021-01-01 RX ADMIN — ENOXAPARIN SODIUM 40 MG: 100 INJECTION SUBCUTANEOUS at 00:56

## 2021-01-01 RX ADMIN — HEPARIN SODIUM 5000 UNITS: 5000 INJECTION INTRAVENOUS; SUBCUTANEOUS at 06:11

## 2021-01-01 RX ADMIN — SODIUM CHLORIDE: 9 INJECTION, SOLUTION INTRAVENOUS at 16:28

## 2021-01-01 RX ADMIN — MEROPENEM 1000 MG: 1 INJECTION, POWDER, FOR SOLUTION INTRAVENOUS at 03:39

## 2021-01-01 RX ADMIN — PROPOFOL 40 MCG/KG/MIN: 10 INJECTION, EMULSION INTRAVENOUS at 16:21

## 2021-01-01 RX ADMIN — SENNOSIDES 8.8 MG: 8.8 LIQUID ORAL at 07:41

## 2021-01-01 RX ADMIN — SODIUM CHLORIDE: 9 INJECTION, SOLUTION INTRAVENOUS at 17:41

## 2021-01-01 RX ADMIN — SODIUM CHLORIDE 0.8 MCG/KG/HR: 9 INJECTION, SOLUTION INTRAVENOUS at 08:11

## 2021-01-01 RX ADMIN — Medication 10 MCG/MIN: at 17:20

## 2021-01-01 RX ADMIN — PROPOFOL 45 MCG/KG/MIN: 10 INJECTION, EMULSION INTRAVENOUS at 17:50

## 2021-01-01 RX ADMIN — HEPARIN SODIUM 5000 UNITS: 5000 INJECTION INTRAVENOUS; SUBCUTANEOUS at 21:55

## 2021-01-01 RX ADMIN — ENOXAPARIN SODIUM 40 MG: 100 INJECTION SUBCUTANEOUS at 12:58

## 2021-01-01 RX ADMIN — FENTANYL CITRATE 200 MCG/HR: 50 INJECTION INTRAVENOUS at 20:44

## 2021-01-01 RX ADMIN — INSULIN LISPRO 2 UNITS: 100 INJECTION, SOLUTION INTRAVENOUS; SUBCUTANEOUS at 00:50

## 2021-01-01 RX ADMIN — INSULIN LISPRO 4 UNITS: 100 INJECTION, SOLUTION INTRAVENOUS; SUBCUTANEOUS at 05:33

## 2021-01-01 RX ADMIN — Medication 2 MCG/MIN: at 03:12

## 2021-01-01 RX ADMIN — SODIUM ZIRCONIUM CYCLOSILICATE 10 G: 10 POWDER, FOR SUSPENSION ORAL at 22:08

## 2021-01-01 RX ADMIN — INSULIN LISPRO 4 UNITS: 100 INJECTION, SOLUTION INTRAVENOUS; SUBCUTANEOUS at 18:00

## 2021-01-01 ASSESSMENT — PAIN SCALES - GENERAL
PAINLEVEL_OUTOF10: 0
PAINLEVEL_OUTOF10: 6
PAINLEVEL_OUTOF10: 0

## 2021-01-01 ASSESSMENT — PULMONARY FUNCTION TESTS
PIF_VALUE: 39
PIF_VALUE: 31
PIF_VALUE: 29
PIF_VALUE: 31
PIF_VALUE: 27
PIF_VALUE: 35
PIF_VALUE: 36
PIF_VALUE: 40
PIF_VALUE: 29
PIF_VALUE: 30
PIF_VALUE: 28
PIF_VALUE: 29
PIF_VALUE: 28
PIF_VALUE: 30
PIF_VALUE: 26
PIF_VALUE: 40
PIF_VALUE: 38
PIF_VALUE: 28
PIF_VALUE: 31
PIF_VALUE: 44
PIF_VALUE: 47
PIF_VALUE: 40
PIF_VALUE: 34
PIF_VALUE: 39
PIF_VALUE: 28
PIF_VALUE: 34
PIF_VALUE: 37
PIF_VALUE: 29
PIF_VALUE: 31
PIF_VALUE: 31
PIF_VALUE: 50
PIF_VALUE: 31
PIF_VALUE: 33
PIF_VALUE: 37
PIF_VALUE: 27
PIF_VALUE: 33
PIF_VALUE: 31
PIF_VALUE: 29
PIF_VALUE: 19
PIF_VALUE: 29
PIF_VALUE: 36
PIF_VALUE: 48
PIF_VALUE: 28
PIF_VALUE: 39
PIF_VALUE: 30
PIF_VALUE: 46
PIF_VALUE: 32
PIF_VALUE: 28
PIF_VALUE: 39
PIF_VALUE: 30
PIF_VALUE: 30
PIF_VALUE: 34
PIF_VALUE: 39
PIF_VALUE: 31
PIF_VALUE: 29
PIF_VALUE: 39
PIF_VALUE: 42
PIF_VALUE: 22
PIF_VALUE: 39
PIF_VALUE: 39
PIF_VALUE: 40
PIF_VALUE: 30
PIF_VALUE: 30
PIF_VALUE: 36
PIF_VALUE: 39
PIF_VALUE: 29
PIF_VALUE: 42
PIF_VALUE: 30
PIF_VALUE: 42
PIF_VALUE: 33
PIF_VALUE: 42
PIF_VALUE: 41
PIF_VALUE: 29
PIF_VALUE: 33
PIF_VALUE: 30
PIF_VALUE: 30
PIF_VALUE: 32
PIF_VALUE: 31
PIF_VALUE: 35
PIF_VALUE: 27
PIF_VALUE: 21
PIF_VALUE: 27
PIF_VALUE: 46
PIF_VALUE: 29
PIF_VALUE: 26
PIF_VALUE: 19
PIF_VALUE: 29
PIF_VALUE: 34
PIF_VALUE: 29
PIF_VALUE: 28
PIF_VALUE: 37
PIF_VALUE: 23
PIF_VALUE: 30
PIF_VALUE: 32
PIF_VALUE: 29
PIF_VALUE: 17
PIF_VALUE: 39
PIF_VALUE: 39
PIF_VALUE: 31
PIF_VALUE: 30
PIF_VALUE: 55
PIF_VALUE: 29
PIF_VALUE: 26
PIF_VALUE: 40
PIF_VALUE: 31
PIF_VALUE: 33
PIF_VALUE: 34
PIF_VALUE: 37
PIF_VALUE: 29
PIF_VALUE: 39
PIF_VALUE: 31
PIF_VALUE: 39
PIF_VALUE: 39
PIF_VALUE: 30
PIF_VALUE: 29
PIF_VALUE: 33
PIF_VALUE: 35
PIF_VALUE: 30
PIF_VALUE: 37
PIF_VALUE: 40
PIF_VALUE: 30
PIF_VALUE: 38
PIF_VALUE: 42
PIF_VALUE: 32
PIF_VALUE: 31
PIF_VALUE: 36
PIF_VALUE: 40
PIF_VALUE: 39
PIF_VALUE: 30
PIF_VALUE: 27
PIF_VALUE: 36
PIF_VALUE: 31
PIF_VALUE: 39
PIF_VALUE: 37
PIF_VALUE: 30
PIF_VALUE: 31
PIF_VALUE: 40
PIF_VALUE: 43
PIF_VALUE: 40
PIF_VALUE: 34
PIF_VALUE: 31
PIF_VALUE: 37
PIF_VALUE: 32
PIF_VALUE: 29
PIF_VALUE: 31
PIF_VALUE: 32
PIF_VALUE: 29
PIF_VALUE: 34
PIF_VALUE: 31
PIF_VALUE: 32
PIF_VALUE: 33
PIF_VALUE: 30
PIF_VALUE: 33
PIF_VALUE: 30
PIF_VALUE: 41
PIF_VALUE: 30
PIF_VALUE: 29
PIF_VALUE: 31
PIF_VALUE: 31
PIF_VALUE: 29
PIF_VALUE: 30
PIF_VALUE: 26
PIF_VALUE: 28
PIF_VALUE: 28
PIF_VALUE: 17
PIF_VALUE: 31
PIF_VALUE: 39
PIF_VALUE: 29
PIF_VALUE: 30
PIF_VALUE: 31
PIF_VALUE: 40
PIF_VALUE: 23
PIF_VALUE: 41
PIF_VALUE: 29
PIF_VALUE: 33
PIF_VALUE: 32
PIF_VALUE: 29
PIF_VALUE: 28
PIF_VALUE: 31
PIF_VALUE: 31
PIF_VALUE: 34
PIF_VALUE: 44
PIF_VALUE: 32
PIF_VALUE: 29
PIF_VALUE: 32
PIF_VALUE: 33
PIF_VALUE: 32
PIF_VALUE: 35
PIF_VALUE: 33
PIF_VALUE: 31
PIF_VALUE: 25
PIF_VALUE: 30
PIF_VALUE: 33
PIF_VALUE: 40
PIF_VALUE: 42
PIF_VALUE: 30
PIF_VALUE: 29
PIF_VALUE: 32
PIF_VALUE: 40
PIF_VALUE: 42
PIF_VALUE: 34
PIF_VALUE: 41
PIF_VALUE: 40
PIF_VALUE: 29
PIF_VALUE: 43
PIF_VALUE: 32
PIF_VALUE: 34
PIF_VALUE: 31
PIF_VALUE: 30
PIF_VALUE: 29
PIF_VALUE: 39
PIF_VALUE: 44
PIF_VALUE: 37
PIF_VALUE: 26
PIF_VALUE: 30
PIF_VALUE: 34
PIF_VALUE: 33
PIF_VALUE: 19
PIF_VALUE: 40
PIF_VALUE: 24
PIF_VALUE: 38
PIF_VALUE: 31
PIF_VALUE: 30
PIF_VALUE: 31
PIF_VALUE: 31
PIF_VALUE: 36
PIF_VALUE: 45
PIF_VALUE: 31
PIF_VALUE: 34
PIF_VALUE: 31
PIF_VALUE: 29
PIF_VALUE: 23
PIF_VALUE: 31
PIF_VALUE: 33
PIF_VALUE: 26
PIF_VALUE: 29
PIF_VALUE: 38
PIF_VALUE: 36
PIF_VALUE: 30
PIF_VALUE: 39
PIF_VALUE: 22
PIF_VALUE: 33
PIF_VALUE: 36
PIF_VALUE: 32
PIF_VALUE: 19
PIF_VALUE: 31
PIF_VALUE: 29
PIF_VALUE: 39
PIF_VALUE: 40
PIF_VALUE: 30
PIF_VALUE: 41
PIF_VALUE: 31
PIF_VALUE: 26
PIF_VALUE: 35
PIF_VALUE: 30
PIF_VALUE: 31
PIF_VALUE: 42
PIF_VALUE: 30
PIF_VALUE: 29
PIF_VALUE: 26
PIF_VALUE: 29
PIF_VALUE: 33
PIF_VALUE: 34
PIF_VALUE: 33
PIF_VALUE: 30
PIF_VALUE: 40
PIF_VALUE: 31
PIF_VALUE: 31
PIF_VALUE: 28
PIF_VALUE: 29
PIF_VALUE: 30
PIF_VALUE: 36
PIF_VALUE: 29
PIF_VALUE: 22
PIF_VALUE: 30
PIF_VALUE: 38
PIF_VALUE: 33
PIF_VALUE: 31
PIF_VALUE: 30
PIF_VALUE: 39
PIF_VALUE: 30
PIF_VALUE: 32
PIF_VALUE: 31
PIF_VALUE: 30
PIF_VALUE: 30
PIF_VALUE: 32
PIF_VALUE: 33
PIF_VALUE: 29
PIF_VALUE: 32
PIF_VALUE: 26
PIF_VALUE: 41
PIF_VALUE: 31
PIF_VALUE: 34
PIF_VALUE: 27
PIF_VALUE: 32
PIF_VALUE: 41
PIF_VALUE: 39
PIF_VALUE: 35
PIF_VALUE: 39
PIF_VALUE: 41
PIF_VALUE: 43
PIF_VALUE: 36
PIF_VALUE: 26
PIF_VALUE: 40
PIF_VALUE: 31
PIF_VALUE: 28
PIF_VALUE: 29
PIF_VALUE: 31
PIF_VALUE: 31
PIF_VALUE: 29
PIF_VALUE: 32
PIF_VALUE: 26
PIF_VALUE: 34
PIF_VALUE: 30
PIF_VALUE: 33
PIF_VALUE: 38
PIF_VALUE: 31
PIF_VALUE: 29
PIF_VALUE: 25
PIF_VALUE: 29
PIF_VALUE: 37
PIF_VALUE: 30
PIF_VALUE: 30
PIF_VALUE: 23
PIF_VALUE: 41
PIF_VALUE: 23
PIF_VALUE: 32
PIF_VALUE: 29
PIF_VALUE: 33
PIF_VALUE: 42
PIF_VALUE: 29
PIF_VALUE: 32
PIF_VALUE: 29
PIF_VALUE: 31
PIF_VALUE: 30
PIF_VALUE: 21
PIF_VALUE: 41
PIF_VALUE: 38
PIF_VALUE: 32
PIF_VALUE: 32
PIF_VALUE: 31
PIF_VALUE: 31
PIF_VALUE: 18
PIF_VALUE: 31
PIF_VALUE: 29
PIF_VALUE: 29
PIF_VALUE: 30
PIF_VALUE: 32
PIF_VALUE: 31
PIF_VALUE: 30
PIF_VALUE: 41
PIF_VALUE: 34
PIF_VALUE: 37
PIF_VALUE: 29
PIF_VALUE: 33
PIF_VALUE: 29
PIF_VALUE: 41
PIF_VALUE: 32
PIF_VALUE: 38
PIF_VALUE: 26
PIF_VALUE: 41
PIF_VALUE: 32
PIF_VALUE: 42
PIF_VALUE: 35
PIF_VALUE: 44
PIF_VALUE: 30
PIF_VALUE: 38
PIF_VALUE: 30
PIF_VALUE: 36
PIF_VALUE: 30
PIF_VALUE: 43
PIF_VALUE: 28
PIF_VALUE: 32
PIF_VALUE: 39
PIF_VALUE: 32
PIF_VALUE: 31
PIF_VALUE: 29
PIF_VALUE: 39
PIF_VALUE: 29
PIF_VALUE: 40
PIF_VALUE: 31
PIF_VALUE: 40
PIF_VALUE: 27
PIF_VALUE: 30
PIF_VALUE: 39
PIF_VALUE: 30
PIF_VALUE: 43
PIF_VALUE: 30
PIF_VALUE: 41
PIF_VALUE: 30
PIF_VALUE: 37
PIF_VALUE: 29
PIF_VALUE: 40
PIF_VALUE: 29
PIF_VALUE: 31
PIF_VALUE: 29
PIF_VALUE: 33
PIF_VALUE: 38
PIF_VALUE: 31
PIF_VALUE: 36
PIF_VALUE: 30
PIF_VALUE: 29
PIF_VALUE: 40
PIF_VALUE: 34
PIF_VALUE: 36
PIF_VALUE: 33
PIF_VALUE: 41
PIF_VALUE: 39
PIF_VALUE: 43
PIF_VALUE: 28
PIF_VALUE: 32
PIF_VALUE: 31
PIF_VALUE: 39
PIF_VALUE: 31
PIF_VALUE: 33
PIF_VALUE: 43
PIF_VALUE: 31
PIF_VALUE: 30
PIF_VALUE: 40
PIF_VALUE: 29
PIF_VALUE: 33
PIF_VALUE: 29
PIF_VALUE: 31
PIF_VALUE: 29
PIF_VALUE: 31
PIF_VALUE: 29
PIF_VALUE: 40
PIF_VALUE: 32
PIF_VALUE: 29
PIF_VALUE: 46
PIF_VALUE: 33
PIF_VALUE: 30
PIF_VALUE: 32
PIF_VALUE: 30
PIF_VALUE: 27
PIF_VALUE: 33
PIF_VALUE: 30
PIF_VALUE: 33
PIF_VALUE: 35
PIF_VALUE: 31
PIF_VALUE: 29
PIF_VALUE: 37
PIF_VALUE: 39
PIF_VALUE: 32
PIF_VALUE: 29
PIF_VALUE: 32
PIF_VALUE: 17
PIF_VALUE: 34
PIF_VALUE: 31
PIF_VALUE: 30
PIF_VALUE: 43
PIF_VALUE: 27
PIF_VALUE: 32
PIF_VALUE: 41
PIF_VALUE: 39
PIF_VALUE: 32
PIF_VALUE: 47
PIF_VALUE: 29
PIF_VALUE: 31
PIF_VALUE: 30
PIF_VALUE: 35
PIF_VALUE: 29
PIF_VALUE: 31
PIF_VALUE: 26
PIF_VALUE: 30
PIF_VALUE: 31
PIF_VALUE: 39
PIF_VALUE: 36
PIF_VALUE: 50
PIF_VALUE: 31
PIF_VALUE: 44
PIF_VALUE: 25
PIF_VALUE: 31
PIF_VALUE: 26
PIF_VALUE: 31
PIF_VALUE: 28
PIF_VALUE: 41
PIF_VALUE: 31
PIF_VALUE: 32
PIF_VALUE: 29
PIF_VALUE: 33
PIF_VALUE: 32
PIF_VALUE: 40
PIF_VALUE: 40
PIF_VALUE: 31
PIF_VALUE: 40
PIF_VALUE: 30
PIF_VALUE: 32
PIF_VALUE: 38
PIF_VALUE: 31
PIF_VALUE: 40
PIF_VALUE: 36
PIF_VALUE: 41
PIF_VALUE: 36
PIF_VALUE: 29
PIF_VALUE: 32
PIF_VALUE: 30
PIF_VALUE: 30
PIF_VALUE: 37
PIF_VALUE: 31
PIF_VALUE: 41
PIF_VALUE: 32
PIF_VALUE: 32
PIF_VALUE: 30
PIF_VALUE: 40
PIF_VALUE: 26
PIF_VALUE: 34
PIF_VALUE: 31
PIF_VALUE: 35
PIF_VALUE: 31
PIF_VALUE: 38
PIF_VALUE: 28
PIF_VALUE: 29
PIF_VALUE: 40
PIF_VALUE: 33
PIF_VALUE: 39
PIF_VALUE: 18
PIF_VALUE: 16
PIF_VALUE: 27
PIF_VALUE: 32
PIF_VALUE: 19
PIF_VALUE: 31
PIF_VALUE: 45
PIF_VALUE: 43
PIF_VALUE: 32
PIF_VALUE: 42
PIF_VALUE: 38
PIF_VALUE: 40
PIF_VALUE: 31
PIF_VALUE: 41
PIF_VALUE: 26
PIF_VALUE: 31
PIF_VALUE: 31
PIF_VALUE: 29
PIF_VALUE: 31
PIF_VALUE: 30
PIF_VALUE: 41
PIF_VALUE: 37
PIF_VALUE: 30
PIF_VALUE: 28
PIF_VALUE: 29
PIF_VALUE: 34
PIF_VALUE: 29
PIF_VALUE: 29
PIF_VALUE: 30
PIF_VALUE: 30
PIF_VALUE: 31
PIF_VALUE: 28
PIF_VALUE: 26
PIF_VALUE: 40
PIF_VALUE: 32
PIF_VALUE: 35
PIF_VALUE: 27
PIF_VALUE: 41
PIF_VALUE: 40
PIF_VALUE: 30
PIF_VALUE: 38
PIF_VALUE: 31
PIF_VALUE: 36
PIF_VALUE: 32
PIF_VALUE: 28
PIF_VALUE: 33
PIF_VALUE: 34
PIF_VALUE: 40
PIF_VALUE: 33
PIF_VALUE: 38
PIF_VALUE: 29
PIF_VALUE: 35
PIF_VALUE: 42
PIF_VALUE: 31
PIF_VALUE: 30
PIF_VALUE: 40
PIF_VALUE: 29
PIF_VALUE: 39
PIF_VALUE: 31
PIF_VALUE: 41
PIF_VALUE: 26
PIF_VALUE: 40
PIF_VALUE: 16
PIF_VALUE: 31
PIF_VALUE: 30
PIF_VALUE: 31
PIF_VALUE: 38
PIF_VALUE: 34
PIF_VALUE: 30
PIF_VALUE: 39
PIF_VALUE: 31
PIF_VALUE: 36
PIF_VALUE: 39
PIF_VALUE: 29
PIF_VALUE: 40
PIF_VALUE: 30
PIF_VALUE: 26
PIF_VALUE: 26
PIF_VALUE: 42
PIF_VALUE: 30
PIF_VALUE: 28
PIF_VALUE: 30
PIF_VALUE: 29
PIF_VALUE: 29
PIF_VALUE: 39
PIF_VALUE: 29
PIF_VALUE: 48
PIF_VALUE: 30
PIF_VALUE: 32
PIF_VALUE: 28
PIF_VALUE: 30
PIF_VALUE: 29
PIF_VALUE: 29
PIF_VALUE: 33
PIF_VALUE: 31
PIF_VALUE: 27
PIF_VALUE: 47
PIF_VALUE: 33
PIF_VALUE: 29
PIF_VALUE: 30
PIF_VALUE: 29
PIF_VALUE: 33
PIF_VALUE: 35
PIF_VALUE: 31
PIF_VALUE: 39
PIF_VALUE: 28
PIF_VALUE: 31
PIF_VALUE: 21
PIF_VALUE: 41
PIF_VALUE: 39
PIF_VALUE: 26
PIF_VALUE: 33
PIF_VALUE: 31
PIF_VALUE: 29
PIF_VALUE: 40
PIF_VALUE: 30
PIF_VALUE: 27
PIF_VALUE: 27
PIF_VALUE: 31
PIF_VALUE: 34
PIF_VALUE: 38
PIF_VALUE: 39
PIF_VALUE: 39
PIF_VALUE: 31
PIF_VALUE: 33
PIF_VALUE: 34
PIF_VALUE: 29
PIF_VALUE: 31
PIF_VALUE: 37
PIF_VALUE: 36
PIF_VALUE: 40
PIF_VALUE: 40
PIF_VALUE: 28
PIF_VALUE: 40
PIF_VALUE: 30
PIF_VALUE: 39
PIF_VALUE: 29
PIF_VALUE: 30
PIF_VALUE: 29
PIF_VALUE: 30
PIF_VALUE: 34
PIF_VALUE: 36
PIF_VALUE: 38
PIF_VALUE: 39
PIF_VALUE: 41
PIF_VALUE: 33
PIF_VALUE: 41
PIF_VALUE: 29
PIF_VALUE: 34
PIF_VALUE: 28
PIF_VALUE: 43
PIF_VALUE: 31
PIF_VALUE: 41
PIF_VALUE: 38
PIF_VALUE: 31
PIF_VALUE: 34
PIF_VALUE: 32
PIF_VALUE: 30
PIF_VALUE: 31
PIF_VALUE: 33
PIF_VALUE: 31
PIF_VALUE: 40
PIF_VALUE: 29
PIF_VALUE: 21
PIF_VALUE: 36
PIF_VALUE: 30
PIF_VALUE: 30
PIF_VALUE: 33
PIF_VALUE: 39
PIF_VALUE: 33
PIF_VALUE: 33
PIF_VALUE: 44
PIF_VALUE: 30
PIF_VALUE: 30
PIF_VALUE: 32
PIF_VALUE: 31
PIF_VALUE: 41
PIF_VALUE: 42
PIF_VALUE: 28
PIF_VALUE: 31
PIF_VALUE: 43
PIF_VALUE: 32
PIF_VALUE: 39

## 2021-12-09 PROBLEM — U07.1 COVID-19: Status: ACTIVE | Noted: 2021-01-01

## 2021-12-09 NOTE — ED NOTES
Called transfer center to inquire on bed assignment. States they called nursing supervisor and she states patient does not have a bed at this time.       Zach Hernandez  12/09/21 1513

## 2021-12-09 NOTE — ED TRIAGE NOTES
Patient was diagnosed with COVID 11/29/2021 by home test. She felt very weak and SOB today at home. She lives alone and called EMS. She was started on nonrebreather for pulse ox below 80% on room air. She is 93% on NRB.

## 2021-12-09 NOTE — ED NOTES
Report called to [facility] patient to go to 478. Report given to [facility].  [address] RN  12/09/21 9908

## 2021-12-09 NOTE — ED NOTES
Called transfer center for update on hospitalist. States hospitalist has been paged 3 times with no response. They will repage.       Niels [facility]  12/09/21 4807

## 2021-12-09 NOTE — ED NOTES
Updated patient and her sister on POC to transfer to ED [facility].      Uriel López RN  12/09/21 6942

## 2021-12-09 NOTE — LETTER
[facility]  [address] [address]  Phone: [phone]             December 13, 2021    Patient: Verenice Burciaga   YOB: 1950   Date of Visit: 12/9/2021       To Whom It May Concern:    Verenice Burciaga is currently being treated in our facility/ICU for COVID-19 beginning 12/9/2021       Sincerely,       Joseph Griffith DO         Signature:__________________________________

## 2021-12-09 NOTE — ED NOTES
Report given to [facility]. Patient transported to ED [facility].       Nilay Roa RN  12/09/21 7668

## 2021-12-09 NOTE — ED NOTES
Transfer center called with bed assignment. Patient assigned bed 478-1. Number for report is [phone].       Nidia Brito  12/09/21 5436

## 2021-12-09 NOTE — ED NOTES
Transfer center called with Dr. Robby Orellana on the line to speak with Dr. Elton Coy. Dr. Robby Orellana accepted patient. Waiting on bed assignment. Currently waiting on discharges.       Terell Hernandez  12/09/21 5260

## 2021-12-09 NOTE — ED NOTES
Called transfer center to avni hospitalist at [facility] for Dr to  consult.       Brent Navas  12/09/21 6943

## 2021-12-09 NOTE — ED NOTES
Patient up to [facility] to void. Aware of POC to transfer to ED [facility].      Katharina Lee RN  12/09/21 6583

## 2021-12-09 NOTE — ED PROVIDER NOTES
CC/HPI: 65-year-old female with past medical history of anemia, chronic back pain, obesity, and pneumonia to the emergency department to the emergency department with worsening upper respiratory symptoms secondary to Covid. Patient states 10 days ago she took a home test and was positive for Covid. Patient states she has been coughing and congested since that time. Symptoms have been worse over the last 24 hours and now she feels short of breath with some discomfort with coughing to her chest and increased weakness. Patient states nausea with decreased appetite. Patient lives alone. Called EMS upon EMS arrival pulse ox below 80%. Patient placed on nonrebreather and was 93% upon arrival to the emergency department. VITALS/PMH/PSH: Reviewed per nurses notes    REVIEW OF SYSTEMS: As in chief complaint history of present illness, otherwise all other systems are reviewed and negative the total 10 systems reviewed    PHYSICAL EXAM:  GEN: Pt alert and oriented, patient appears to be in moderate respiratory depression stress upon arrival to the emergency department  HEENT:         Normocephalic/Atramatic        PERRL, EOMI       Throat non-edematous. No erythema noted. No exudates noted. Moist membranes  NECK: Nontender, no signs of trauma, no lymphadenopathy  HEART: Patient heart rate in the 80s. Reg S1/S2, without murmer, rub or gallop  LUNGS: Diminished throughout with bibasilar rales and rhonchi  ABDOMEN: Patient morbidly obese. Bowel sounds positive, soft, nondistended. Non-tender to palpation. No guarding rebound or rigidity  MUSCULOSKELETAL/EXTREMITITES:  No signs of trauma, cyanosis or edema. No calf swelling or tenderness  LYMPH: no peripheral lympadenopathy noted  SKIN:  Warm & dry, no rash  NEUROLOGIC:  Alert and oriented. Speech clear    Medical decision making/ED course;  IV was established in route.   Patient with lab work that showed a CMP within normal limits other than sodium being slightly low at 133 and anion gap being elevated at 16. Glucose was 124. Troponin was negative. Patient transaminases were elevated slightly with an ALT and AST of 58 and 68 respectively. Patient with a white blood cell count of 6.8 with an H&H of 13.7 and 41.5 and platelets of 720    Urinalysis showed 100 of protein otherwise no signs of infection    Next x-ray was interpreted by radiologist as follows;  Narrative   COMPARISON: No prior studies available for comparison.           HISTORY: SOB, COVID        TECHNIQUE: AP view           FINDINGS:   Bilateral groundglass opacities are seen in the right upper, mid and lower lobes, left mid and left lower lobes. The right hemidiaphragm is elevated. The cardiac silhouette is nonenlarged. The bony structures are grossly intact.           Impression   Bilateral infiltrates in the distribution could be due to Covid 19. Patient was given a gram of p.o. Tylenol also 100 mg of IV doxycycline. Patient was kept on nonrebreather and pulse ox remained in the low 90s       ER EKG interpretation  -normal sinus rhythm at 79 bpm with no signs of acute ST-T changes. Questionable old inferior infarct. Normal intervals. Critical care time noninclusive of procedures was 60 minutes    Case was discussed with hospitalist, Dr. Rhonda Alamo,  who agreed to accept the patient in transfer.   Clinical impression;  1) COVID-19 pneumonia  2) with hypoxemia and acute respiratory distress  Disposition/plan;   Patient being transferred via EMS to [facility] for further evaluation and treatment     Shanta Muir DO  12/31/21 0510

## 2021-12-09 NOTE — ED NOTES
Patient and sister Jose Monteiro aware of POC to transfer to ED [facility] for admission when bed is available.       Melania Felder RN  12/09/21 5652

## 2021-12-10 NOTE — CONSULTS
Inpatient consult to Pulmonology  Consult performed by: Vale Schilder, MD  Consult ordered by: Gabbie Servin MD             Admit Date: 12/9/2021    PCP:  No primary care provider on file. CHIEF COMPLAINT: Shortness of breath    HPI:  The patient is a 66 y.o. female with significant past medical history of shingles who presents with shortness of breath, fever, cough and weakness for few days. She tested positive for COVID-19. She is unvaccinated. She was severely hypoxic in ER. She was placed on HFNC. She is currently on 95% FiO2. Her sats in the upper 90es. Had a CXR which showed B/L infiltrates. D-dimer is 2. CRP 40-50. She does not have any fever. She is short of breath with minimal activities. Past Medical History:      Diagnosis Date    Headache(784.0)     History of mammography, screening 1990's    History of shingles 2009    Papanicolaou smear for cervical cancer screening     NEVER        Past Surgical History:        Procedure Laterality Date    APPENDECTOMY      BREAST SURGERY  1990s       Current Medications:     sodium chloride flush  5-40 mL IntraVENous 2 times per day    enoxaparin  40 mg SubCUTAneous BID    dexamethasone  6 mg IntraVENous Q24H    remdesivir IVPB  100 mg IntraVENous Q24H     Home Meds:  Prior to Admission medications    Not on File       Allergies:  Patient has no known allergies. Social History:      reports that she has never smoked. She has never used smokeless tobacco. She reports that she does not drink alcohol and does not use drugs. TOBACCO:   reports that she has never smoked. She has never used smokeless tobacco.     ETOH:   reports no history of alcohol use. Family History:   family history includes High Blood Pressure in her mother. Review of Systems  Complete review of systems done and negative unless otherwise noted positive.        Objective:     PHYSICAL EXAM:      VITALS:  /66   Pulse 81   Temp 98.4 °F (36.9 °C) (Oral) Resp 24   Ht 5' 1\" (1.549 m)   Wt 207 lb 10.8 oz (94.2 kg)   SpO2 94%   BMI 39.24 kg/m²   24HR INTAKE/OUTPUT:  No intake or output data in the 24 hours ending 12/10/21 1112  CURRENT PULSE OXIMETRY:  SpO2: 94 %  24HR PULSE OXIMETRY RANGE:  SpO2  Av.4 %  Min: 81 %  Max: 94 %    General appearance - alert, ill appearing, and in moderate distress  Mental status - alert, oriented to person, place, and time  Eyes - pupils equal and reactive, extraocular eye movements intact. Normal sclera and conjunctiva   Nose - normal and patent, no erythema   Neck - supple, no significant adenopathy. No thyromegaly. Chest - diminished to auscultation, no wheezes, rales or rhonchi, symmetric air entry  Heart - normal rate, regular rhythm, normal S1, S2, no murmurs, rubs, clicks or gallops  Abdomen - soft, nontender, nondistended, no masses or organomegaly. Bowel sounds present.  No hernia   Rectal - deferred, not clinically indicated  Neurological - alert, oriented, normal speech, no focal findings or movement disorder noted, motor and sensory grossly normal bilaterally  Musculoskeletal - no joint tenderness, deformity or swelling  Extremities - peripheral pulses normal, no pedal edema, no clubbing or cyanosis  Skin - normal coloration and turgor, no rashes, no suspicious skin lesions noted  Psych: Normal mood          DATA:    CBC:   Recent Labs     21  1052 12/10/21  0848   WBC 6.8 5.9   HGB 13.7 13.5   HCT 41.5 41.0    376     BMP:    Recent Labs     21  1052 12/10/21  0848   * 138   K 4.1 4.2   CL 97 103   CO2 20 19*   BUN 20 22   CREATININE 0.81 0.76   GLUCOSE 124* 181*   CALCIUM 8.6 8.7   MG  --  2.4     HEPATIC:   Recent Labs     21  1052 12/10/21  0848   AST 68* 73*   ALT 58* 70*   BILITOT 0.3 0.3   ALKPHOS 127 124     LACTATE:   Recent Labs     21  1052 12/10/21  0848   LACTA 1.6 1.5     PROCALCITONIN:   Recent Labs     21  2103   PROCAL 0.07     TROPONIN:   Recent Labs

## 2021-12-10 NOTE — ACP (ADVANCE CARE PLANNING)
Advance Care Planning     Advance Care Planning Inpatient Note  [facility] Department    Today's Date: 12/10/2021  Unit: [facility]  4W MED SURG UNIT    Received request from family. Upon review of chart and communication with care team, patient's decision making abilities are not in question. . Patient was/were present in the room during visit. Goals of ACP Conversation:  Discuss advance care planning documents  Facilitate a discussion related to patient's goals of care as they align with the patient's values and beliefs. Health Care Decision Makers:       Primary Decision Maker: Melina Mariaelena - Brother/Sister - [phone]    Summary:  Completed [facility]    Advance Care Planning Documents (Patient Wishes):  Healthcare Power of /Advance Directive Appointment of Health Care Agent     Assessment:  Patient completed her ACP notes and [address] document during this hospitalization. The patient named her sister Ivis Lloyd as her primary medical decision maker. Interventions:  Assisted in the completion of documents according to patient's wishes at this time  Encouraged ongoing ACP conversation with future decision makers and loved ones    Care Preferences Communicated:     Hospitalization:  If the patient's health worsens and it becomes clear that the chance of recovery is unlikely,     the patient wants hospitalization. Ventilation:   If the patient, in their present state of health, suddenly became very ill and unable to breathe on their own,     the patient would desire the use of a ventilator (breathing machine). If their health worsens and it becomes clear that the change of recovery is unlikely,     the patient would NOT desire the use of a ventilator (breathing machine).     Resuscitation:  In the event the patient's heart stopped as a result of an underlying serious health condition, the patient communicates a preference for resuscitative attempts (CPR). Outcomes/Plan:  ACP Discussion: Completed  New advance directive completed. Returned original document(s) to patient, as well as copies for distribution to appointed agents  Copy of advance directive given to staff to scan into medical record. Routed ACP note to attending provider or other IDT member.     Electronically signed by Pollie Libman, [facility] on 12/10/2021 at 3:59 PM

## 2021-12-10 NOTE — PROGRESS NOTES
Hospitalist Progress Note      PCP: No primary care provider on file. Date of Admission: 12/9/2021    Chief Complaint:    No chief complaint on file. Subjective: The patient is short of breath; attempted a goal of care discussion but patient is not able to make a decision on if she would be ok with ventilator in life or death situations yet. 12 point ROS negative other than mentioned above     Medications:  Reviewed    Infusion Medications    sodium chloride      sodium chloride 100 mL/hr at 12/09/21 2100     Scheduled Medications    sodium chloride flush  5-40 mL IntraVENous 2 times per day    enoxaparin  40 mg SubCUTAneous BID    dexamethasone  6 mg IntraVENous Q24H    remdesivir IVPB  100 mg IntraVENous Q24H     PRN Meds: sodium chloride flush, sodium chloride, ondansetron **OR** ondansetron, polyethylene glycol, acetaminophen **OR** acetaminophen    No intake or output data in the 24 hours ending 12/10/21 1354    Exam:    /66   Pulse 81   Temp 98.4 °F (36.9 °C) (Oral)   Resp 24   Ht 5' 1\" (1.549 m)   Wt 207 lb 10.8 oz (94.2 kg)   SpO2 94%   BMI 39.24 kg/m²     General appearance: On high flow. HEENT: Conjunctivae/corneas clear. Neck:  Trachea midline. Respiratory:  Normal respiratory effort. Clear to auscultation  Cardiovascular: Regular rate and rhythm   Abdomen: Soft, non-tender, non-distended with normal bowel sounds. Musculoskeletal: No clubbing, cyanosis or edema bilaterally.     Neuro: Non Focal.   Capillary Refill: Brisk,< 3 seconds   Peripheral Pulses: +2 palpable, equal bilaterally     Labs:   Recent Labs     12/09/21  1052 12/10/21  0848   WBC 6.8 5.9   HGB 13.7 13.5   HCT 41.5 41.0    376     Recent Labs     12/09/21  1052 12/10/21  0848   * 138   K 4.1 4.2   CL 97 103   CO2 20 19*   BUN 20 22   CREATININE 0.81 0.76   CALCIUM 8.6 8.7     Recent Labs     12/09/21  1052 12/10/21  0848   AST 68* 73*   ALT 58* 70*   BILITOT 0.3 0.3   ALKPHOS 127 124     Recent Labs     12/10/21  0848   INR 1.0     Recent Labs     12/09/21  1052 12/10/21  0848   TROPONINI <0.010 <0.010     Urinalysis:      Lab Results   Component Value Date    NITRU Negative 12/09/2021    WBCUA 0-2 12/09/2021    BACTERIA RARE 12/09/2021    BLOODU Trace-intact 12/09/2021    SPECGRAV 1.015 12/09/2021    GLUCOSEU Negative 12/09/2021     Radiology:  CTA CHEST W WO CONTRAST    (Results Pending)     Assessment/Plan:    1. Acute respiratory failure with hypoxia secondary to COVID 19 PNA              Shortness of breath and hypoxia              Requiring high flow saturation still 89-90%              Bilateral pneumonia on CT scan; plan for CTA              Remdesivir and Decadron              ID consult for possible baricitinib              Bipap ordered                 - palliative and pastoral care consulted for goals of care; patient states she cannot decide whether or not she would be ok with a ventilator in a life or death situation  3. Dehydration                - Improved; stop IVF; err on the side of trying to stay dry given level of hypoxia     Code Status: Full  DVT prophylaxis: Lovenox       Active Hospital Problems    Diagnosis Date Noted    COVID-19 [U07.1] 12/09/2021     Additional work up or/and treatment plan may be added today or then after based on clinical progression. I am managing a portion of pt care. Some medical issues are handled by other specialists. Additional work up and treatment should be done in out pt setting by pt PCP and other out pt providers. In addition to examining and evaluating pt, I spent additional time explaining care, normal and abnormal findings, and treatment plan. All of pt questions were answered. Counseling, diet and education were  provided. Case will be discussed with nursing staff when appropriate. Family will be updated if and when appropriate. Diet: ADULT DIET;  Regular; 4 carb choices (60 gm/meal)    Code Status: Full Code    PT/OT Eval Electronically signed by Vivi Alvarez MD on 12/10/2021 at 1:54 PM

## 2021-12-10 NOTE — H&P
dry, no rash or erythema  Head: normocephalic and atraumatic  Eyes: pupils equal, round, and reactive to light, extraocular eye movements intact, conjunctivae normal  ENT: tympanic membrane, external ear and ear canal normal bilaterally, nose without deformity, nasal mucosa and turbinates normal without polyps  Neck: supple and non-tender without mass, no thyromegaly or thyroid nodules, no cervical lymphadenopathy  Pulmonary/Chest: clear to auscultation bilaterally- no wheezes   Cardiovascular: normal rate, regular rhythm, normal S1 and S2, no murmurs   Abdomen: soft, non-tender, non-distended, normal bowel sounds, no masses or organomegaly  Extremities: no cyanosis, clubbing   Musculoskeletal: normal range of motion, no joint swelling, deformity or tenderness  Neurologic: reflexes normal and symmetric, no cranial nerve deficit     LABS:  Recent Labs     12/09/21  1052   *   K 4.1   CL 97   CO2 20   BUN 20   CREATININE 0.81   GLUCOSE 124*   CALCIUM 8.6       Recent Labs     12/09/21  1052   WBC 6.8   RBC 4.80   HGB 13.7   HCT 41.5   MCV 86.5   MCH 28.5   MCHC 33.0   RDW 13.0          No results for input(s): POCGLU in the last 72 hours.     CBC with Differential:    Lab Results   Component Value Date    WBC 6.8 12/09/2021    RBC 4.80 12/09/2021    HGB 13.7 12/09/2021    HCT 41.5 12/09/2021     12/09/2021    MCV 86.5 12/09/2021    MCH 28.5 12/09/2021    MCHC 33.0 12/09/2021    RDW 13.0 12/09/2021    LYMPHOPCT 14.8 12/09/2021    MONOPCT 3.1 12/09/2021    BASOPCT 0.3 12/09/2021    MONOSABS 0.2 12/09/2021    LYMPHSABS 1.0 12/09/2021    EOSABS 0.0 12/09/2021    BASOSABS 0.0 12/09/2021     CMP:    Lab Results   Component Value Date     12/09/2021    K 4.1 12/09/2021    CL 97 12/09/2021    CO2 20 12/09/2021    BUN 20 12/09/2021    CREATININE 0.81 12/09/2021    GFRAA >60.0 12/09/2021    LABGLOM >60.0 12/09/2021    GLUCOSE 124 12/09/2021    PROT 7.0 12/09/2021    LABALBU 3.5 12/09/2021    CALCIUM 8.6 12/09/2021    BILITOT 0.3 12/09/2021    ALKPHOS 127 12/09/2021    AST 68 12/09/2021    ALT 58 12/09/2021       Radiology: XR CHEST PORTABLE    Result Date: 12/9/2021  COMPARISON: No prior studies available for comparison. HISTORY: SOB, COVID TECHNIQUE: AP view FINDINGS: Bilateral groundglass opacities are seen in the right upper, mid and lower lobes, left mid and left lower lobes. The right hemidiaphragm is elevated. The cardiac silhouette is nonenlarged. The bony structures are grossly intact. Bilateral infiltrates in the distribution could be due to Covid 19. EKG: Normal sinus rhythm    ASSESSMENT/ PLAN[de-identified]      Active Problems:    COVID-19  Resolved Problems:    * No resolved hospital problems. *      1. Acute respiratory failure with hypoxia   Shortness of breath and hypoxia   Requiring high flow saturation still 89-90%   Bilateral pneumonia on CT scan   Remdesivir and Decadron  2. COVID-19 pneumonia   Shortness breath, fatigue, cough, chills, low appetite   Hypoxic-currently on high flow   Remdesivir, Decadron   Anticoagulation per COVID-19 protocol   Monitor procalcitonin, D-dimer, inflammatory markers   CTA if D-dimer elevated   ID consult  3. Dehydration   IV fluids   Encourage oral intake    Code Status: Full  DVT prophylaxis: Lovenox       Electronically signed by Alaina Louis MD on 12/9/2021 at 8:23 PM      NOTE: This report was transcribed using voice recognition software. Every effort was made to ensure accuracy; however, inadvertent computerized transcription errors may be present.

## 2021-12-10 NOTE — CARE COORDINATION
CALL TO ROOM, PT DID NOT ANSWER. PT ON HIGH FLOW AND MAY NOT BE ABLE TO TALK RIGHT NOW. PT WILL NEED CMI/ACP AND IMM.  WILL TRY BACK WHEN ABLE

## 2021-12-10 NOTE — FLOWSHEET NOTE
12/10/21 @  [facility] Infectious Disease answering service of consult # [phone]    12/10/21 @  Notified Pulmonology answering service of consult # [id number]

## 2021-12-10 NOTE — CONSULTS
Infectious Diseases Inpatient Consult Note      Reason for Consult:   [address]  Requesting Physician:   Dr. Charli Kraft  Primary Care Physician:  No primary care provider on file. History Obtained From:   Pt, EPIC    Admit Date: 12/9/2021  Hospital Day: 2      HISTORY OF PRESENT ILLNESS:  This is a 66 y.o. female was admitted to [facility]  from home through ER with 2 weeks history of progressive shortness of breath and. cough, SOB, myalgias, fatigue, poor appetite and diarrhea. tested +ve for COVID19 on admission  No history of COVID-19 vaccination  Was hypoxic on admission and currently is on BiPAP  Was started on remdesivir Decadron and anticoagulation yesterday    Past medical surgical and social history were reviewed and are as detailed below  Past Medical History:   Diagnosis Date    Headache(784.0)     History of mammography, screening 1990's    History of shingles 2009    Papanicolaou smear for cervical cancer screening     NEVER       Past Surgical History:   Procedure Laterality Date    APPENDECTOMY      BREAST SURGERY  1990s       Current Medications:     sodium chloride flush  5-40 mL IntraVENous 2 times per day    enoxaparin  40 mg SubCUTAneous BID    dexamethasone  6 mg IntraVENous Q24H    remdesivir IVPB  100 mg IntraVENous Q24H       Allergies:  Patient has no known allergies.     Social History     Socioeconomic History    Marital status: Single     Spouse name: Not on file    Number of children: Not on file    Years of education: Not on file    Highest education level: Not on file   Occupational History    Not on file   Tobacco Use    Smoking status: Never Smoker    Smokeless tobacco: Never Used   Substance and Sexual Activity    Alcohol use: No    Drug use: No    Sexual activity: Never   Other Topics Concern    Not on file   Social History Narrative    Not on file     Social Determinants of Health     Financial Resource Strain:     Difficulty of Paying Living Expenses: Not on file   Food Insecurity:     Worried About Running Out of Food in the Last Year: Not on file    Ian of Food in the Last Year: Not on file   Transportation Needs:     Lack of Transportation (Medical): Not on file    Lack of Transportation (Non-Medical): Not on file   Physical Activity:     Days of Exercise per Week: Not on file    Minutes of Exercise per Session: Not on file   Stress:     Feeling of Stress : Not on file   Social Connections:     Frequency of Communication with Friends and Family: Not on file    Frequency of Social Gatherings with Friends and Family: Not on file    Attends [employer] Services: Not on file    Active Member of [address] [place] [address] [employer] or Organizations: Not on file    Attends Club or Organization Meetings: Not on file    Marital Status: Not on file   Intimate Partner Violence:     Fear of Current or Ex-Partner: Not on file    Emotionally Abused: Not on file    Physically Abused: Not on file    Sexually Abused: Not on file   Housing Stability:     Unable to Pay for Housing in the Last Year: Not on file    Number of [place] in the Last Year: Not on file    Unstable Housing in the Last Year: Not on file         Family History:   Family History   Problem Relation Age of Onset    High Blood Pressure Mother        Review of Systems  14 system review is negative other than HPI    Physical Exam  Vitals:    12/10/21 0800 12/10/21 0833 12/10/21 1046 12/10/21 1115   BP: 135/66      Pulse: 81      Resp: 19 20 24 24   Temp: 98.4 °F (36.9 °C)      TempSrc: Oral      SpO2: (!) 81% 90% 94% 94%   Weight:       Height:         General Appearance: alert and oriented to person, place and time, well-developed and well-nourished, in no acute distress, on BiPAP  Skin: warm anddry, no rash. Head: normocephalic and atraumatic  Eyes: extraocular eye movements intact, conjunctivae normal, anicteric sclerae  ENT:  normal mucous membranes.  No thrush  Lungs: normal respiratory effort, diminished breath sounds with bilateral lung rales  Heart:RRR, nl S1/S2, no murmur  Abdomen: soft, no tenderness, no H-S-megaly, + BS, obese  NEUROLOGICAL: alert and oriented x 3, no focal deficits  No leg edema  No erythema, no warmth, no tenderness  Body mass index is 39.24 kg/m². DATA:    Lab Results   Component Value Date    WBC 5.9 12/10/2021    HGB 13.5 12/10/2021    HCT 41.0 12/10/2021    MCV 86.3 12/10/2021     12/10/2021     Lab Results   Component Value Date    CREATININE 0.76 12/10/2021    BUN 22 12/10/2021     12/10/2021    K 4.2 12/10/2021     12/10/2021    CO2 19 (L) 12/10/2021       Hepatic Function Panel:   Lab Results   Component Value Date    ALKPHOS 124 12/10/2021    ALT 70 12/10/2021    AST 73 12/10/2021    PROT 6.9 12/10/2021    BILITOT 0.3 12/10/2021    LABALBU 3.3 12/10/2021            No results for input(s): PH, PO2, PCO2, HCO3, BE, O2SAT in the last 72 hours. XR CHEST PORTABLE    Result Date: 12/9/2021  Bilateral infiltrates in the distribution could be due to Covid 19. IMPRESSION:    · Severe COVID 19 pneumonia  · Acute respiratory failure with hypoxia, on BiPAP  · Hypercoagulable state  · H/O obesity and lack of vaccination    Patient Active Problem List   Diagnosis    COVID-19       PLAN:  · Baricitinib for up to 14 days or until clinical improvement as discussed with the patient. I explained to the patient that is being used under emergency use authorization for COVID-19 in patients with high oxygen requirement and with benefit outweighs risk in patients with her condition.   Patient is agreeable to take the medication   · IV remdesivir x 5 days, start date 12/09  · CBC CMP daily while on Remdesivir  · IV Decadron and anticoagulation as ordered  · O2 support and weaning as tolerated  · Continue droplet isolation plus for COVID19  · We will monitor for superimposed bacterial infection and blood clots  ·     Discussed with patient    Luis Mckeon MD

## 2021-12-10 NOTE — PROGRESS NOTES
Baricitinib Daily Monitoring    Black Box Warnings:   Infections  Malignancy  Thrombosis  Tuberculosis  Precautions:   GI perforations  Hematologic toxicity  Hepatic effects   Hypersensitivity  Lipid abnormalities      Monitor for signs and symptoms of Black Box Warnings and precautions. AST   Date Value Ref Range Status   12/10/2021 73 (H) 0 - 35 U/L Final     ALT   Date Value Ref Range Status   12/10/2021 70 (H) 0 - 33 U/L Final     WBC   Date Value Ref Range Status   12/10/2021 5.9 4.8 - 10.8 K/uL Final     Hemoglobin   Date Value Ref Range Status   12/10/2021 13.5 12.0 - 16.0 g/dL Final     Platelets   Date Value Ref Range Status   12/10/2021 376 130 - 400 K/uL Final        Baricitinib is not recommended in patients with the following abnormal lab values:   Absolute lymphocyte count (ALC) <200 cells/µL (consider interruption until [facility] is ?200 cells/µL)  Absolute neutrophil count (ANC) <500 cells/µL (consider interruption until ANC is ?500 cells/µL)    Lymphocytes Absolute   Date Value Ref Range Status   12/10/2021 0.9 (L) 1.0 - 4.8 K/uL Final     Neutrophils Absolute   Date Value Ref Range Status   12/10/2021 4.9 1.4 - 6.5 K/uL Final       Dose is renally adjusted based on eGFR:  ?60 mL/min/1.73 m2: No dose adjustment  30 to <60 mL/min/1.73 m2: ?9 years - 2 mg once daily; 2 to <9 years - 1 mg once daily  15 to <30 mL/min/1.73 m2: ?9 years - 1 mg once daily; 2 to <9 years - Not recommended  <15 mL/min/1.73 m2: Not recommended    GFR Non-   Date Value Ref Range Status   12/10/2021 >60.0 >60 Final     Comment:     >60 mL/min/1.73m2 EGFR, calc. for ages 25 and older using the  MDRD formula (not corrected for weight), is valid for stable  renal function. GFR    Date Value Ref Range Status   12/10/2021 >60.0 >60 Final     Comment:     >60 mL/min/1.73m2 EGFR, calc. for ages 25 and older using the  MDRD formula (not corrected for weight), is valid for stable  renal function.

## 2021-12-10 NOTE — PROGRESS NOTES
Pt states she only takes OTC meds at home- zinc, tumeric, and vit D. She does not know the doses of them. Pt was 82% on 60L/75% FiO2. Respiratory was called and pt was bumped up to 60L/100% FiO2. Electronically signed by Veronica García RN on 12/10/2021 at 8:23 AM    1050 Pt proning at this time 94% pulse ox on high flow 60L/100% FiO2. Shift assessment complete. VSS. Pt is Axox4. meds given per mar. Call light within reach. Will continue to monitor. Electronically signed by Veronica García RN on 12/10/2021 at 10:53 AM      1230 pt dropped into the 80s. Now on BIPAP. [address] and Lakisha Mcgrath updated.      Electronically signed by Veronica García RN on 12/10/2021 at 2:33 PM

## 2021-12-10 NOTE — CARE COORDINATION
Pneumonia booklet and zones sheet, [employer] \"COVID-19 Discharge Instructions\" and \"Recovery After COVID-19\" left outside room as patient is in isolation. Requested that nurse deliver to patient. within normal limits

## 2021-12-11 NOTE — PROGRESS NOTES
Pt assessed and medications given. Pt tolerated well. Pt A&Ox4 with no c/o pain. Pt currently on 60L BiPAP. VSS. Call light within reach. Will continue to monitor.

## 2021-12-11 NOTE — PROGRESS NOTES
Hospitalist Progress Note      PCP: No primary care provider on file. Date of Admission: 12/9/2021    Chief Complaint:    No chief complaint on file. Subjective: The patient is short of breath; on Bipap; ok with being placed on vent in an emergency. 12 point ROS negative other than mentioned above     Medications:  Reviewed    Infusion Medications    sodium chloride       Scheduled Medications    baricitinib  4 mg Oral Daily    sodium chloride flush  5-40 mL IntraVENous 2 times per day    enoxaparin  40 mg SubCUTAneous BID    dexamethasone  6 mg IntraVENous Q24H    remdesivir IVPB  100 mg IntraVENous Q24H     PRN Meds: sodium chloride flush, sodium chloride, ondansetron **OR** ondansetron, polyethylene glycol, acetaminophen **OR** acetaminophen      Intake/Output Summary (Last 24 hours) at 12/11/2021 1358  Last data filed at 12/10/2021 1856  Gross per 24 hour   Intake 1731 ml   Output --   Net 1731 ml       Exam:    /76   Pulse 73   Temp 97.5 °F (36.4 °C) (Oral)   Resp 28   Ht 5' 1\" (1.549 m)   Wt 207 lb 10.8 oz (94.2 kg)   SpO2 92%   BMI 39.24 kg/m²     General appearance: On Bipap  HEENT: Conjunctivae/corneas clear. Neck:  Trachea midline. Respiratory:  Normal respiratory effort. Clear to auscultation  Cardiovascular: Regular rate and rhythm   Abdomen: Soft, non-tender, non-distended with normal bowel sounds. Musculoskeletal: No clubbing, cyanosis or edema bilaterally. Neuro: Non Focal.   Capillary Refill: Brisk,< 3 seconds   Peripheral Pulses: +2 palpable, equal bilaterally     Labs:   Recent Labs     12/09/21  1052 12/09/21  1052 12/10/21  0848 12/11/21  0738 12/11/21  1328   WBC 6.8  --  5.9 10.0  --    HGB 13.7   < > 13.5 13.4 14.0   HCT 41.5  --  41.0 41.1  --      --  376 470*  --     < > = values in this interval not displayed.      Recent Labs     12/09/21  1052 12/09/21  1052 12/10/21  0848 12/11/21  0738 12/11/21  1328   *  --  138 138  --    K 4.1  --  4.2 4.3  --    CL 97  --  103 105  --    CO2 20  --  19* 18*  --    BUN 20  --  22 23  --    CREATININE 0.81   < > 0.76 0.64 0.6   CALCIUM 8.6  --  8.7 8.6  --     < > = values in this interval not displayed. Recent Labs     12/09/21  1052 12/10/21  0848 12/11/21  0738   AST 68* 73* 108*   ALT 58* 70* 108*   BILITOT 0.3 0.3 0.4   ALKPHOS 127 124 133*     Recent Labs     12/10/21  0848   INR 1.0     Recent Labs     12/09/21  1052 12/10/21  0848   TROPONINI <0.010 <0.010     Urinalysis:      Lab Results   Component Value Date    NITRU Negative 12/09/2021    WBCUA 0-2 12/09/2021    BACTERIA RARE 12/09/2021    BLOODU Trace-intact 12/09/2021    SPECGRAV 1.015 12/09/2021    GLUCOSEU Negative 12/09/2021     Radiology:  CTA CHEST W WO CONTRAST   Final Result      No CT evidence for pulmonary embolus. Ground glass opacities, associated with COVID 19 pneumonia among multiple other etiologies. Assessment/Plan:    1. Acute respiratory failure with hypoxia secondary to COVID 19 PNA              Shortness of breath and hypoxia              Requiring high flow saturation still 89-90%              Bilateral pneumonia on CT scan; plan for CTA              Remdesivir and Decadron              ID consulted and now on baricitinib              Bipap settings increased; transfer to ICU placed as patient is likely to be intubated in the next 1-2 days if she worsens   3. Dehydration                - Improved; stop IVF; err on the side of trying to stay dry given level of hypoxia     Code Status: Full  DVT prophylaxis: Lovenox       Active Hospital Problems    Diagnosis Date Noted    Acute respiratory failure with hypoxia ([facility] [J96.01]     Hypercoagulable state ([facility] [D68.59]     COVID-19 [U07.1] 12/09/2021     Additional work up or/and treatment plan may be added today or then after based on clinical progression. I am managing a portion of pt care. Some medical issues are handled by other specialists. Additional work up and treatment should be done in out pt setting by pt PCP and other out pt providers. In addition to examining and evaluating pt, I spent additional time explaining care, normal and abnormal findings, and treatment plan. All of pt questions were answered. Counseling, diet and education were  provided. Case will be discussed with nursing staff when appropriate. Family will be updated if and when appropriate. Diet: ADULT DIET;  Regular; 4 carb choices (60 gm/meal)    Code Status: Full Code    PT/OT Eval     Electronically signed by Aleks Rodríguez MD on 12/11/2021 at 1:58 PM

## 2021-12-11 NOTE — PROGRESS NOTES
Pulmonary ICU Progress Note    PRIMARY SERVICE: Pulmonary Disease    INTERVAL HPI: Patient seen and examined at bedside, Interval Notes, orders reviewed. Nursing notes noted    Patient was transferred to ICU from the floor. She was on BiPAP and respiratory rate was in mid 40s. Nurse called me and recommends to intubate patient. Patient was intubated currently on vent support with assist control with rate of twenty-six tidal volume 300 FiO2 100% and PEEP of 16. O2 saturation is 90 to 93%. ABG done post intubation chest shows pH 7.309 PCO2 forty-six PO2 sixty-one saturation eighty-nine and bicarb 23.3. Patient's is on Precedex, fentanyl and Nimbex. She is on low-dose Levophed. She is on Remdesivir 100 mg daily dexamethasone 6 mg daily and Lovenox 30 mg subcutaneous twice a day. She is afebrile no vomiting or diarrhea. Chest x-ray ET tube in place bilateral infiltration      Review of Systems   sedated unable to obtain. Intake/Output Summary (Last 24 hours) at 12/11/2021 1845  Last data filed at 12/10/2021 1856  Gross per 24 hour   Intake 1731 ml   Output --   Net 1731 ml       Vitals:  /76   Pulse 73   Temp 97.5 °F (36.4 °C) (Oral)   Resp (!) 34   Ht 5' 1\" (1.549 m)   Wt 207 lb 10.8 oz (94.2 kg)   SpO2 (!) 88%   BMI 39.24 kg/m²   EXAM:  General: Orally intubated. Tachypnea with respiratory rate in mid 30s. .  Sedated and paralyzed  Head: Atraumatic ,Normocephalic   Eyes: PERRL. No sclera icterus. No conjunctival injection. No discharge   ENT: No nasal  discharge. Pharynx clear. Neck:  Trachea midline. No thyromegaly, no JVD, No cervical adenopathy. Resp : Normal effort,  No accessory muscle use. Scattered Rales. No wheezing. No rhonchi. CV: Normal  rate. Regular rhythm. No mumur ,  Rub or gallop  ABD: Non-tender. Non-distended. No masses. No organmegaly. Normal bowel sounds. No hernia.   EXT: No Pitting, No Cyanosis No clubbing  CNS: Sedated      ABG:     Lab Results   Component Value Date    PHART 7.309 12/11/2021    JCA1HVF 46 12/11/2021    PO2ART 61 12/11/2021    FBU9IIK 23.3 12/11/2021    BEART -3 12/11/2021    C1DRDPXL 89 12/11/2021     Lab Results   Component Value Date    LACTA 1.5 12/10/2021     O2 Device: Heated high flow cannula  O2 Flow Rate (L/min): 60 L/min    MV Settings:     Vent Mode: AC/VC  Vt Ordered: 300 mL  Rate Set: 26 bmp  FiO2 : 100 %  PEEP/CPAP: 16  Peak Inspiratory Pressure: 26 cmH2O  Mean Airway Pressure: 18 cmH20  I:E Ratio: 1:1.3    Diet NPO  ADULT TUBE FEEDING; Orogastric; Peptide Based High Protein; Continuous; 20; No; 100; Q 6 hours    IV:    dexmedetomidine (PRECEDEX) IV infusion 0.8 mcg/kg/hr (12/11/21 1717)    propofol 50 mcg/kg/min (12/11/21 1716)    fentaNYL (SUBLIMAZE) infusion      norepinephrine 5 mcg/min (12/11/21 1736)    cisatracurium (NIMBEX) infusion      sodium chloride         Medications:  Scheduled Meds:   famotidine (PEPCID) injection  20 mg IntraVENous BID    succinylcholine chloride  0.6 mg/kg IntraVENous Once    chlorhexidine  15 mL Mouth/Throat BID    cisatracurium Besylate  10 mg IntraVENous Once    baricitinib  4 mg Oral Daily    sodium chloride flush  5-40 mL IntraVENous 2 times per day    enoxaparin  40 mg SubCUTAneous BID    dexamethasone  6 mg IntraVENous Q24H    remdesivir IVPB  100 mg IntraVENous Q24H       PRN Meds:  fentanNYL, sodium chloride flush, sodium chloride, ondansetron **OR** ondansetron, polyethylene glycol, acetaminophen **OR** acetaminophen        Radiology      CTA CHEST W WO CONTRAST    Result Date: 12/10/2021  EXAMINATION:  CHEST CTA WITH CONTRAST (PULMONARY EMBOLISM PROTOCOL) CLINICAL HISTORY:  Hypoxia. Shortness of breath. Covid positive. Technique:  Spiral axial CT acquisition of the chest from the thoracic inlet to the upper abdomen following IV contrast.  2-D images were reconstructed in the sagittal and coronal planes. 3-D MIPS images were generated in the coronal and axial planes.  Images were UROBILINOGEN 0.2   BILIRUBINUR Negative   BLOODU Trace-intact   GLUCOSEU Negative   AMORPHOUS 1+     Assessment: This is a critically ill patient at risk of deterioration / death , needing close ICU monitoring and intervention due to below noted problems      1. Severe COVID-19 pneumonia  2. Acute respiratory failure with hypoxia status post intubation on vent  3. Hypercoagulable state secondary to COVID-19 pneumonia    Suggestion:  Patient is just intubated on vent support with assist control with rate of 26 tidal volume 300 FiO2 100% and PEEP of 16. She severe chest x-ray shows ET tube in place bilateral infiltration. Likely ARDS due to COVID-19 pneumonia. ABG done post intubation shows pH of 7.309 PCO2 46 PO2 61 saturation 89 bicarb 23.3. Continue continue vent support and supportive care. Lung protective strategy. ARDS protocol. DVT and GI prophylaxis. Continue Remdesivir 100 mg IV daily. Dexamethasone 6 mg daily. Lovenox 30 mg subcutaneous daily. Patient's required sedation with fentanyl Precedex and Nimbex drip due to persistent tachypnea. Prone position as tolerated. Critical care time spent reviewing labs/films, examining patient, collaborating with otherphysicians but excluding procedures for life threatening organ failure is 34  minutes.       SIGNATURE: Nigel Bermudez MD, [facility]

## 2021-12-11 NOTE — PROGRESS NOTES
Shift assessment completed. Lungs diminished, resp 36, SPO2 low 90s heated high flow at 60L, FIO2 100%. Patient anxious this shift, given one-time dose Ativan. Bipap maintained, calls appropriately. Droplet precautions maintained for positive Covid status.

## 2021-12-11 NOTE — PLAN OF CARE
Nutrition Problem #1: Inadequate oral intake  Intervention: Food and/or Nutrient Delivery: Start Tube Feeding (Start trophic TF.   Peptide Based High Protein TF (Vital HP) @ 20 ml/hr x 24 hrs, 100 ml water flush every 6 hrs)  Nutritional Goals: Initiation and tolerance to TF

## 2021-12-11 NOTE — PROCEDURES
Endotracheal Intubation Procedure Note    Indication for endotracheal intubation: impending respiratory failure  Sedation: etomidate  Paralytic: succinylcholine  Lidocaine: no  Atropine: no  Equipment: Curved video laryngoscope blade. Cricoid Pressure: no  Number of attempts: 1  ETT location confirmed by by Judson Lopez..

## 2021-12-11 NOTE — PROGRESS NOTES
Called to ICU due to impending respiratory failure. Patient is on bipap, on precedex with a RR greater than 40. Discussed with patient who did agree to intubation. Intubation was successful. Intensivist consulted for vent management.     Emashawna Arabia

## 2021-12-11 NOTE — PROGRESS NOTES
kg) (? source)   · Admission Body Weight: 207 lb (93.9 kg)    · Usual Body Weight:  (N/A)     · Ideal Body Weight: 105 lbs; % Ideal Body Weight  > 100%   · BMI: 39.1  · BMI Categories: Obese Class 2 (BMI 35.0 -39.9)       Nutrition Diagnosis:   · Inadequate oral intake related to impaired respiratory function as evidenced by intubation    Nutrition Interventions:   Food and/or Nutrient Delivery:  Start Tube Feeding (Start trophic TF.   Peptide Based High Protein TF (Vital HP) @ 20 ml/hr x 24 hrs, 100 ml water flush every 6 hrs)  Nutrition Education/Counseling:  Education not indicated   Coordination of Nutrition Care:  Continue to monitor while inpatient    Goals:  Initiation and tolerance to TF       Nutrition Monitoring and Evaluation:   Food/Nutrient Intake Outcomes:  Enteral Nutrition Intake/Tolerance  Physical Signs/Symptoms Outcomes:  Biochemical Data, GI Status, Hemodynamic Status, Weight     Electronically signed by Isis Dobson RD, LD on 12/11/21 at 5:03 PM EST

## 2021-12-12 PROBLEM — J12.82 PNEUMONIA DUE TO COVID-19 VIRUS: Status: ACTIVE | Noted: 2021-01-01

## 2021-12-12 NOTE — FLOWSHEET NOTE
Shift summary  [id number]    Pt to ICU from 4W, transported on NRB. To bed, placed on BIPAP 16/8 100%. Pt a&ox4, anxious, follows commands. MP SR. Resp rate upper 30s to 50s, sats 88-92% on BIPAP. LS dim/rhonchi. Call to Dr. Toni Briones, precedex gtt started see MAR.     precedex gtt ineffective, resp rate remains in 45s. Call to Dr. Toni Briones to update, orders received to intubate. Pt agreeable to intubation, reiterated full code. Reiterated that pt sister is POA, paperwork in chart. Message to Dr. Harris Peterson to update. [id number] intubated. New IV placed. meds per MAR. Despite increase in sedation resp rate remained in the 40s. Call to Dr. Toni Briones to update, order received for nimbex. Initial TOF 4/4 with 6 amps. After nimbex, resp rate down to 26. Serna and OG placed. 1930 report to LINDA Nieves.  Electronically signed by Vince White RN on 12/11/2021 at 9:24 PM

## 2021-12-12 NOTE — CARE COORDINATION
Care coordination updates:    Patient was transferred to ICU from [address] [place] [address] on 12/11 due to impending respiratory failure. She required intubation which (per physician's note) patient was able to consent to. Patient remains vented, sedated. CM to follow, patient still needs CMI completed when able. ACP completed by spiritual care on 12/10.

## 2021-12-12 NOTE — PROGRESS NOTES
Indications:  Need for central venous access    Consent:  Obtained  after explaining indications, risks, and alternatives. Anesthesia:  Topical anesthesia was applied with 1% lidocaine    Procedure:  A time-out was performed. Rt. neck  was prepared with ChloraPrep and draped in a sterile fashion. 1% lidocaine was used to topically anesthetize the site. Under ultrasound guidance ,long needle was used to access the Rt. Int jugular which was obtained with no difficulty. Guidewire was passed to the needle without resistance. Needle was removed then track was dilated with dilators sheath and a triple-lumen central venous catheter was advanced over the guidewire and secured in place with 2 sutures after obtaining adequate venous blood flow from all 3 ports of the catheter which were then flushed with normal saline. Line was secured into place with 2 sutures at 16 cm. Patient tolerated procedure very well with no immediate complications seen.   Estimated Blood loss   less than 5 cc    Postprocedure   chest x-ray will be done to confirm adequate placement and rule out pneumothorax      Mateus Kessler MD

## 2021-12-12 NOTE — PROGRESS NOTES
Pulmonary ICU Progress Note    PRIMARY SERVICE: Pulmonary Disease    INTERVAL HPI: Patient seen and examined at bedside, Interval Notes, orders reviewed. Nursing notes noted    Patient is on vent Support with assist control with rate of 32 tidal volume 300 FiO2 80% with PEEP of 14. O2 saturation 98%. Earlier patient has a respiratory rate of 26 ABG in the morning shows pH 6.98 PCO2 110 PO2 293 saturation is 100% and bicarb 26.1. FiO2 was decrease to 80%. Rate was increased to 32. Patient is on sedation with diprivan, fentanyl and Nimbex. She is on low-dose Levophed. Right IJ, central line placed on right side of neck.       Vent Mode: AC/VC  Vt Ordered: 300 mL  Rate Set: 32 bmp  FiO2 : 80 %  PEEP/CPAP: 14  Peak Inspiratory Pressure: 34 cmH2O  Mean Airway Pressure: 22 cmH20  I:E Ratio: 1:3   dextrose      sodium chloride      dexmedetomidine (PRECEDEX) IV infusion Stopped (12/12/21 0204)    propofol 30 mcg/kg/min (12/12/21 5984)    fentaNYL (SUBLIMAZE) infusion 125 mcg/hr (12/12/21 5972)    norepinephrine 4 mcg/min (12/12/21 0902)    cisatracurium (NIMBEX) infusion 1.5 mcg/kg/min (12/12/21 7725)    sodium chloride        sodium zirconium cyclosilicate  10 g Oral TID    insulin lispro  0-12 Units SubCUTAneous Q4H    famotidine (PEPCID) injection  20 mg IntraVENous BID    chlorhexidine  15 mL Mouth/Throat BID    baricitinib  4 mg Oral Daily    sodium chloride flush  5-40 mL IntraVENous 2 times per day    enoxaparin  40 mg SubCUTAneous BID    dexamethasone  6 mg IntraVENous Q24H    remdesivir IVPB  100 mg IntraVENous Q24H       Review of Systems    unable to obtain        Intake/Output Summary (Last 24 hours) at 12/12/2021 1401  Last data filed at 12/12/2021 0759  Gross per 24 hour   Intake 1265.03 ml   Output 150 ml   Net 1115.03 ml       Vitals:  /66   Pulse 65   Temp 97.2 °F (36.2 °C) (Bladder)   Resp (!) 32   Ht 5' 1\" (1.549 m)   Wt 207 lb 10.8 oz (94.2 kg)   SpO2 98%   BMI 39.24 kg/m²   EXAM:  General: Sedated paralyzed, orally intubated comfortable in bed, No distress. Head: Atraumatic ,Normocephalic   Eyes: PERRL. No sclera icterus. No conjunctival injection. No discharge   ENT: No nasal  discharge. Pharynx clear. Neck:  Trachea midline. No thyromegaly, no JVD, No cervical adenopathy. Resp : Normal effort,  No accessory muscle use. No Rales. No wheezing. No rhonchi. CV: Normal  rate. Regular rhythm. No mumur ,  Rub or gallop  ABD: Non-tender. Non-distended. No masses. No organmegaly. Normal bowel sounds. No hernia.   EXT: No Pitting, No Cyanosis No clubbing  CNS: Sedated and paralyzed      ABG:     Lab Results   Component Value Date    PHART 6.982 12/12/2021    WRH0MPY 110 12/12/2021    PO2ART 293 12/12/2021    FRE7DCL 26.1 12/12/2021    BEART -6 12/12/2021    I3SFFIEN 100 12/12/2021     Lab Results   Component Value Date    LACTA 1.5 12/10/2021     O2 Device: PAP (positive airway pressure)  O2 Flow Rate (L/min): 60 L/min    MV Settings:     Vent Mode: AC/VC  Vt Ordered: 300 mL  Rate Set: 32 bmp  FiO2 : 80 %  PEEP/CPAP: 14  Peak Inspiratory Pressure: 34 cmH2O  Mean Airway Pressure: 22 cmH20  I:E Ratio: 1:3    Diet NPO  ADULT TUBE FEEDING; Orogastric; Peptide Based High Protein; Continuous; 20; No; 100; Q 6 hours    IV:    dextrose      sodium chloride      dexmedetomidine (PRECEDEX) IV infusion Stopped (12/12/21 0204)    propofol 30 mcg/kg/min (12/12/21 5149)    fentaNYL (SUBLIMAZE) infusion 125 mcg/hr (12/12/21 0637)    norepinephrine 4 mcg/min (12/12/21 0902)    cisatracurium (NIMBEX) infusion 1.5 mcg/kg/min (12/12/21 0357)    sodium chloride         Medications:  Scheduled Meds:   sodium zirconium cyclosilicate  10 g Oral TID    insulin lispro  0-12 Units SubCUTAneous Q4H    famotidine (PEPCID) injection  20 mg IntraVENous BID    chlorhexidine  15 mL Mouth/Throat BID    baricitinib  4 mg Oral Daily    sodium chloride flush  5-40 mL IntraVENous 2 times per day  enoxaparin  40 mg SubCUTAneous BID    dexamethasone  6 mg IntraVENous Q24H    remdesivir IVPB  100 mg IntraVENous Q24H       PRN Meds:  glucose, dextrose, glucagon (rDNA), dextrose, fentanNYL, sodium chloride flush, sodium chloride, ondansetron **OR** ondansetron, polyethylene glycol, acetaminophen **OR** acetaminophen        Radiology      CTA CHEST W WO CONTRAST    Result Date: 12/10/2021  EXAMINATION:  CHEST CTA WITH CONTRAST (PULMONARY EMBOLISM PROTOCOL) CLINICAL HISTORY:  Hypoxia. Shortness of breath. Covid positive. Technique:  Spiral axial CT acquisition of the chest from the thoracic inlet to the upper abdomen following IV contrast.  2-D images were reconstructed in the sagittal and coronal planes. 3-D MIPS images were generated in the coronal and axial planes. Images were reviewed on the PACS workstation. All images including the 3-D MIPS were personally archived. Contrast:  IV administration of 100 ml Isovue 300 All CT scans at this facility use dose modulation, iterative reconstruction, and/or weight based dosing when appropriate to reduce radiation dose to as low as reasonably achievable. Comparison:  None. RESULT: Limitations:  None. Lines, tubes, and devices:  None. Evaluation for thromboembolic disease:  No evidence for thromboembolic disease involving the right heart chambers, main pulmonary arteries, lobar pulmonary arteries, segmental pulmonary arteries, or visualized subsegmental pulmonary arteries. Lung parenchyma and pleura:  Central airways grossly patent. Ground glass opacities throughout the lungs involving all lobes. More consolidative type opacity at the lung bases. No pleural effusion or pneumothorax. Thoracic inlet, heart, and mediastinum:  Visualized thyroid unremarkable. Scattered subcentimeter mediastinal and thoracic lymph nodes, probably reactive. Normal thoracic aorta. Normal pulmonary artery size. Normal heart size. No coronary artery calcifications.  No pericardial effusion or thickening. Small hiatal hernia. Bones:  No acute osseous findings. No destructive osseous lesions. Multiple degenerative changes with bulky bridging endplate osteophytes. Soft tissues: Unremarkable. Upper abdomen:  No acute abnormality in the imaged upper abdomen. Cholelithiasis. Partially imaged left small renal cyst.     No CT evidence for pulmonary embolus. Ground glass opacities, associated with COVID 19 pneumonia among multiple other etiologies. XR CHEST PORTABLE    Result Date: 12/12/2021  Exam: XR CHEST PORTABLE History:  post line placement Technique: AP portable view of the chest obtained. Comparison: none Chest x-ray portable Findings: The patient is intubated, there is an NG tube coursing towards the stomach    There is a  right internal jugular central line with the tip projecting in the region of the superior vena cava. The cardiomediastinal silhouette is within normal limits. There is no pneumothorax There are bilateral patchy alveolar opacities. There are no pleural effusions. Bones of the thorax appear intact. Status post central line placement. There are bilateral alveolar opacities , infiltrates worrisome for viral COVID-19 pneumonia. XR CHEST PORTABLE    Result Date: 12/12/2021  Exam: XR CHEST PORTABLE History:  f/u intubated yesterday, covid Technique: AP portable view of the chest obtained. Comparison: none Chest x-ray portable Findings: The patient is intubated, there is an NG tube coursing towards the stomach  The cardiomediastinal silhouette is within normal limits. There are bilateral patchy alveolar opacities. There are no pleural effusions. Bones of the thorax appear intact. There are bilateral alveolar opacities , infiltrates worrisome for viral COVID-19 pneumonia. XR CHEST PORTABLE    Result Date: 12/11/2021  Exam: XR CHEST PORTABLE History:  intubation Technique: AP portable view of the chest obtained. Comparison: none Chest x-ray portable Findings:  The Labs     12/09/21  1751   COLORU Yellow   PHUR 5.5   WBCUA 0-2   BACTERIA RARE*   CLARITYU Clear   SPECGRAV 1.015   LEUKOCYTESUR Negative   UROBILINOGEN 0.2   BILIRUBINUR Negative   BLOODU Trace-intact   GLUCOSEU Negative   AMORPHOUS 1+           Assessment: This is a critically ill patient at risk of deterioration / death , needing close ICU monitoring and intervention due to below noted problems    1. Severe COVID-19 pneumonia  2. Acute respiratory failure with hypoxia status post intubation on vent  3. Hypercoagulable state secondary to COVID-19 pneumonia     Suggestion:  And is currently on vent support with assist control with rate of 32 FiO2 80% PEEP of 14 and tidal volume 300. ABG this morning showing pH of 698 with PCO2 of 100 and PO2 293 saturation 100% bicarb 26.4. FiO2 decreased from 100% to 80% and rate increased from 26-32. Continue vent support with lung protective strategy. Head of the bed 30 degree. Continue IV sedation and paralyzed with Nimbex. Prone position as tolerated. Right IJ was placed due to need for venous access. She is on low-dose Levophed 4 mics. Continue DVT GI prophylaxis. Continue Remdesivir 100 mg IV daily. Dexamethasone 6 mg daily. Lovenox 30 mg subcutaneous daily. We will repeat ABG this evening. Patient has Serna  catheter , Patient is on DVT and GI prophylaxis    Critical care time spent reviewing labs/films, examining patient, collaborating with otherphysicians but excluding procedures for life threatening organ failure is 34 minutes.       SIGNATURE: Ector Talavera MD, [facility]

## 2021-12-12 NOTE — PROGRESS NOTES
Panel:  Lab Results   Component Value Date    ALKPHOS 131 12/12/2021    ALT 77 12/12/2021    AST <5 12/12/2021    PROT 6.2 12/12/2021    BILITOT 0.5 12/12/2021    LABALBU 1.8 12/12/2021     PEEP: 16    Procalcitonin [[id number]] Collected: 12/11/21 5446 Updated: 12/11/21 2115 Specimen Type: Blood Procalcitonin0.03ng/mL   Comment: Suspected Sepsis:   Low likelihood of sepsis  <.50 ng/mL      Impression:  Status post central line placement. There are bilateral alveolar opacities  infiltrates worrisome for viral COVID-19 pneumonia.       D-dimer=1.85      IMPRESSION:    · Critical COVID-19 pneumonia  · Acute respiratory failure with severe hypoxia requiring intubation  · Hypercoagulable state  · No evidence of bacterial pneumonia at this point with normal procalcitonin    Patient Active Problem List   Diagnosis    Pneumonia due to COVID-19 virus    Acute respiratory failure with hypoxia ([address]    Hypercoagulable state ([address]       PLAN:  · Baricitinib up to 14 days or until clinical improvement  · IV remdesivir for 5 days, start date December 9  · Follow-up CBC complete metabolic daily while on remdesivir  · Decadron and anticoagulation as ordered  · Vent support and weaning as tolerated  · Continue droplet plus isolation for COVID-19  · Monitor for superimposed bacterial infection blood clots        Pasuqale Galarza MD

## 2021-12-12 NOTE — PROGRESS NOTES
Hospitalist Progress Note      PCP: No primary care provider on file. Date of Admission: 12/9/2021    Chief Complaint:    No chief complaint on file. Subjective: The patient is intubated and sedated; unable to complete full 12 point ROS. Medications:  Reviewed    Infusion Medications    dextrose      sodium chloride      dexmedetomidine (PRECEDEX) IV infusion Stopped (12/12/21 0204)    propofol 30 mcg/kg/min (12/12/21 0759)    fentaNYL (SUBLIMAZE) infusion 125 mcg/hr (12/12/21 3424)    norepinephrine 4 mcg/min (12/12/21 0902)    cisatracurium (NIMBEX) infusion 1.5 mcg/kg/min (12/12/21 0357)    sodium chloride       Scheduled Medications    sodium zirconium cyclosilicate  10 g Oral TID    insulin lispro  0-12 Units SubCUTAneous Q4H    famotidine (PEPCID) injection  20 mg IntraVENous BID    chlorhexidine  15 mL Mouth/Throat BID    baricitinib  4 mg Oral Daily    sodium chloride flush  5-40 mL IntraVENous 2 times per day    enoxaparin  40 mg SubCUTAneous BID    dexamethasone  6 mg IntraVENous Q24H    remdesivir IVPB  100 mg IntraVENous Q24H     PRN Meds: glucose, dextrose, glucagon (rDNA), dextrose, fentanNYL, sodium chloride flush, sodium chloride, ondansetron **OR** ondansetron, polyethylene glycol, acetaminophen **OR** acetaminophen      Intake/Output Summary (Last 24 hours) at 12/12/2021 1253  Last data filed at 12/12/2021 0759  Gross per 24 hour   Intake 1265.03 ml   Output 150 ml   Net 1115.03 ml       Exam:    /66   Pulse 65   Temp 97 °F (36.1 °C) (Bladder)   Resp (!) 32   Ht 5' 1\" (1.549 m)   Wt 207 lb 10.8 oz (94.2 kg)   SpO2 98%   BMI 39.24 kg/m²     General appearance: Intubated  HEENT: Conjunctivae/corneas clear. Neck:  Trachea midline. Respiratory:  Normal respiratory effort. Clear to auscultation  Cardiovascular: Regular rate and rhythm   Abdomen: Soft, non-tender, non-distended with normal bowel sounds.   Musculoskeletal: No clubbing, cyanosis or edema bilaterally. Neuro: Sedated   Capillary Refill: Brisk,< 3 seconds   Peripheral Pulses: +2 palpable, equal bilaterally     Labs:   Recent Labs     12/10/21  0848 12/10/21  0848 12/11/21 0738 12/11/21  1328 12/11/21 2249 12/12/21  0555 12/12/21  0839   WBC 5.9  --  10.0  --   --  16.8*  --    HGB 13.5   < > 13.4   < > 14.8 13.6 15.6   HCT 41.0  --  41.1  --   --  41.4  --      --  470*  --   --  516*  --     < > = values in this interval not displayed. Recent Labs     12/10/21  0848 12/10/21  0848 12/11/21 0738 12/11/21 1328 12/11/21 2249 12/12/21 0555 12/12/21 0839     --  138  --   --  130*  --    K 4.2  --  4.3  --   --  5.7*  --      --  105  --   --  98  --    CO2 19*  --  18*  --   --  19*  --    BUN 22  --  23  --   --  29*  --    CREATININE 0.76   < > 0.64   < > 0.9 0.94* 1.2   CALCIUM 8.7  --  8.6  --   --  7.5*  --     < > = values in this interval not displayed. Recent Labs     12/10/21  0848 12/11/21 0738 12/12/21 0555   AST 73* 108* <5   ALT 70* 108* 77*   BILITOT 0.3 0.4 0.5   ALKPHOS 124 133* 131*     Recent Labs     12/10/21  0848   INR 1.0     Recent Labs     12/10/21  0848   TROPONINI <0.010     Urinalysis:      Lab Results   Component Value Date    NITRU Negative 12/09/2021    WBCUA 0-2 12/09/2021    BACTERIA RARE 12/09/2021    BLOODU Trace-intact 12/09/2021    SPECGRAV 1.015 12/09/2021    GLUCOSEU Negative 12/09/2021     Radiology:  XR CHEST PORTABLE   Final Result   There are bilateral alveolar opacities , infiltrates worrisome for viral COVID-19 pneumonia. XR CHEST PORTABLE   Final Result   Status post intubation. There are bilateral alveolar opacities , infiltrates worrisome for viral COVID-19 pneumonia. CTA CHEST W WO CONTRAST   Final Result      No CT evidence for pulmonary embolus. Ground glass opacities, associated with COVID 19 pneumonia among multiple other etiologies.                      XR CHEST PORTABLE    (Results Pending)     Assessment/Plan:    1. Acute respiratory failure with hypoxia secondary to COVID 19 PNA              Intubated; vent management per intensivist              Remdesivir and Decadron              ID consulted and now on baricitinib    2. Hyperkalemia              Lokelma ordered    3. Steroid induced hyperglycemia              Check hba1c; SSI     Code Status: Full  DVT prophylaxis: Lovenox       Active Hospital Problems    Diagnosis Date Noted    Acute respiratory failure with hypoxia ([facility] [J96.01]     Hypercoagulable state ([facility] [D68.59]     Pneumonia due to COVID-19 virus [U07.1, J12.82] 12/09/2021     Additional work up or/and treatment plan may be added today or then after based on clinical progression. I am managing a portion of pt care. Some medical issues are handled by other specialists. Additional work up and treatment should be done in out pt setting by pt PCP and other out pt providers. In addition to examining and evaluating pt, I spent additional time explaining care, normal and abnormal findings, and treatment plan. All of pt questions were answered. Counseling, diet and education were  provided. Case will be discussed with nursing staff when appropriate. Family will be updated if and when appropriate.       Diet: Diet NPO  ADULT TUBE FEEDING; Orogastric; Peptide Based High Protein; Continuous; 20; No; 100; Q 6 hours    Code Status: Full Code    PT/OT Eval     Electronically signed by Caden Phillips MD on 12/12/2021 at 12:53 PM

## 2021-12-12 NOTE — FLOWSHEET NOTE
Shift summary     0730 assumed care of pt. Remains on vent, sedated, paralyzed. Core temp low will place coreen hugger. Critical K, reported to hospitalist.     0800 AM assessment complete see flowsheet. MP SR/SB. LS dim with rales. Oral care done. abd rounded/s/nt bs hypoactive. OG was clamped, TF started. meds per MAR. SCD's in place, mepliex in place. IV sites WDL plan to obtain CVC line today. 0930 Dr. Thomas Worthy here, updated. Will probably not need to prone based on ABG's but will reevaluate at 1600 ABGs. 6102 call to pt POA Amelia Bautista, updated, consent obtained for art line and CVC line. [id number] Dr. Thomas Worthy unable to place art line after multiple attempts. CVC placed successfully to R IJ, pt linden well. CXR obtained and placement confirmed, okay to use. 1400 complete linen change done. mepilex placed to sacrum. Boots placed. Oral care done. IV tubing changed and gtts infusing through CVC. Able to wean levo gtt off. Coreen hugger stopped as pt normothermic. Pt face appears flushed. 1600 no change from previous assessment, tolerating TF, VSS. Pt face remains flushed despite coreen hugger being off. Electronically signed by Joss Sarkar RN on 12/12/2021 at 4:56 PM     1630     1700 noted sats 85, unresponsive to O2 bolus. Suctioned- no secretions. Changed SPO2 location from ear to finger and sats back up to 95. Will monitor. 1730 repositioned. I/o's done. Electronically signed by Joss Sarkar RN on 12/12/2021 at 6:31 PM    [id number] spoke with pt sister Corinne Valentine ([phone]) who is from [place]. Updated. Stated pt sister Amelia Bautista also has Covid and Jose Elias Marrero flew up from [place] to help take care of her. Jose Elias Marrero mentioned she had cancelled a cruise and needed documentation of pt being in hospital from a physician to turn in for travel insurance. Will discuss tomorrow with RN manager as to best option. Also discussed POA papers with Jose Elias Marrero.  Pt only listed sister Amelia Bautista as POA, no alternate agent. Concern exists if pt POA who has COVID ends up in similar position as pt. Pt is not  and has no children. This RN suggested possibly having POA write/sign paper stating in case of not being able to perform POA duties, Jeane Pastrana is to take over. Jeane Pastrana agreed with idea and stated she would discuss with Odell Castellano.  Electronically signed by Larisa Lynch RN on 12/12/2021 at 6:56 PM

## 2021-12-13 PROBLEM — D64.9 ANEMIA: Status: ACTIVE | Noted: 2020-08-04

## 2021-12-13 PROBLEM — G56.03 BILATERAL CARPAL TUNNEL SYNDROME: Status: ACTIVE | Noted: 2019-11-15

## 2021-12-13 PROBLEM — H43.821 VITREOMACULAR TRACTION SYNDROME OF RIGHT EYE: Status: ACTIVE | Noted: 2019-02-26

## 2021-12-13 PROBLEM — M54.50 CHRONIC BILATERAL LOW BACK PAIN WITHOUT SCIATICA: Status: ACTIVE | Noted: 2019-11-15

## 2021-12-13 PROBLEM — H40.033 ANATOMICAL NARROW ANGLE, BILATERAL: Status: ACTIVE | Noted: 2019-02-25

## 2021-12-13 PROBLEM — G57.12 MERALGIA PARAESTHETICA, LEFT: Status: ACTIVE | Noted: 2019-11-15

## 2021-12-13 PROBLEM — R01.1 MURMUR: Status: ACTIVE | Noted: 2020-06-19

## 2021-12-13 PROBLEM — H91.93 BILATERAL HEARING LOSS: Status: ACTIVE | Noted: 2019-04-18

## 2021-12-13 PROBLEM — E66.01 OBESITY, CLASS III, BMI 40-49.9 (MORBID OBESITY) (HCC): Status: ACTIVE | Noted: 2018-03-27

## 2021-12-13 PROBLEM — H93.13 TINNITUS OF BOTH EARS: Status: ACTIVE | Noted: 2019-04-18

## 2021-12-13 PROBLEM — G89.29 CHRONIC BILATERAL LOW BACK PAIN WITHOUT SCIATICA: Status: ACTIVE | Noted: 2019-11-15

## 2021-12-13 PROBLEM — Z96.1 PSEUDOPHAKIA OF BOTH EYES: Status: ACTIVE | Noted: 2019-03-27

## 2021-12-13 PROBLEM — H35.341 LAMELLAR MACULAR HOLE OF RIGHT EYE: Status: ACTIVE | Noted: 2019-02-26

## 2021-12-13 NOTE — PROGRESS NOTES
12- Received handoff report from Sabrina Yanes, [address] [place] [address]. Patient intubated, sedated and paralyzed. Vital signs stable. 2000-Initial shift assessment. See flowsheets for complete assessment. Pupils 2cm, round and non-reactive. 4/4 twitches on 1.0 of nimbex, increased to 1.5. Patient temperature cool to touch.

## 2021-12-13 NOTE — PROGRESS NOTES
PHARMACY NOTE:   ICU Rounds Attended (10-15 minutes outside patient room):    Pt diagnosis: COVID-19    IV Fluids: none at this time    Renal:   Recent Labs     12/12/21  2343 12/13/21  0600 12/13/21  0812   CREATININE 1.2 1.99* 1.2    Estimated Creatinine Clearance: 45 mL/min (based on SCr of 1.2 mg/dL). Antimicrobial Therapy:   Day 5/5: remdesivir   3 days of baricitiniib - currently on HOLD due to lymphocytes   Cultures none at this time    ID on consult: yes    Recent Labs     12/11/21  0738 12/12/21  0555 12/13/21  0600 12/13/21  0845   WBC 10.0 16.8* NOT DONE 10.5           Pressors:   norepinephrine @ 2 mcg/min --> 0.02 mcg/kg/min, per last rate change on MAR (date: 12/13/21, time: 1233)  Sedation:   Precedex @ OFF 12/13/21 @ 0204  Fentanyl @ 150mcg/hr, per last rate change on MAR (date: 12/13/21, time: 1353)  Propofol @ 30 mcg/kg/min, per last rate change on MAR (date: 12/13/21, time: 1308)   Drips: Nimbex @ 1.5mcg/kg/min    Insulin Therapy (goal: 140-180): no coverage at  this time   Recent Labs     12/11/21  0738 12/12/21  0555 12/13/21  0600   GLUCOSE 157* 235* 109*       Steroid Therapy: day 5/10: dexamethasone 6mg IV daily   Stress Ulcer Prophylaxis:    Famotidine 20mg IV daily (renally adjusted dosing)   On at home: none at this time     DVT Prophylaxis/Anticoagulant Therapy: Lovenox 40mg SQ BID   Recent Labs     12/12/21  0555 12/13/21  0600 12/13/21  0845   * NOT DONE 417*     No results for input(s): INR in the last 72 hours.       Bowel Regimen:   Miralax daily PRN     IV to PO: none at this time    Diet (NPO, tube feeds, TPN): tube feeds     Oxygen Therapy: intubated      Follow up/Changes:   Renal dosing - famotidine   Baricitnib monitoring - page to ID to see if appropriate to continue therapy, on HOLD for now      Quintin MoralesD, BCPS   12/13/2021 1:42 PM

## 2021-12-13 NOTE — PROGRESS NOTES
Infectious Diseases Inpatient Progress Note          HISTORY OF PRESENT ILLNESS:  Follow up critical COVID-19 pneumonia with severe hypoxia requiring intubation on IV remdesivir, oral baricitinib, Decadron and Lovenox subcu, well tolerated. Patient is currently intubated and sedated on assist control 70%. On Levophed at 4 mics  Hyperbilirubinemia and elevated liver function tests  Currently paralyzed on Nimbex  Low-grade fevers with T-max of 99.5  Current Medications:     [START ON 12/14/2021] famotidine (PEPCID) injection  20 mg IntraVENous Daily    insulin lispro  0-12 Units SubCUTAneous Q4H    chlorhexidine  15 mL Mouth/Throat BID    [Held by provider] baricitinib  4 mg Oral Daily    sodium chloride flush  5-40 mL IntraVENous 2 times per day    enoxaparin  40 mg SubCUTAneous BID    dexamethasone  6 mg IntraVENous Q24H       Allergies:  Patient has no known allergies. Review of Systems  unable to provide ROS because of sedation      Physical Exam  Vitals:    12/13/21 0724 12/13/21 0743 12/13/21 0815 12/13/21 1200   BP:       Pulse: 69   70   Resp: (!) 32   (!) 32   Temp:  99.7 °F (37.6 °C) 99.5 °F (37.5 °C)    TempSrc:       SpO2: 92%   91%   Weight:       Height:         General Appearance: Intubated and sedated, on assist control 80%  Nonresponsive to verbal stimulation  Skin: warm and dry, no rash.    Head: normocephalic and atraumatic  Eyes: anicteric sclerae  ENT: Intact ET and OG  Lungs: normal respiratory effort, bilateral rales  Heart normal S1-S2 no murmur  Abdomen: soft, no distention or rigidity  No leg edema  No erythema  Clear urine without sediment      DATA:    Lab Results   Component Value Date    WBC 10.5 12/13/2021    HGB 11.9 (L) 12/13/2021    HCT 36.9 (L) 12/13/2021    MCV 88.0 12/13/2021     (H) 12/13/2021     Lab Results   Component Value Date    CREATININE 1.2 12/13/2021    BUN 57 (H) 12/13/2021     12/13/2021    K 4.1 12/13/2021    CL 97 12/13/2021    CO2 29 12/13/2021       Hepatic Function Panel:  Lab Results   Component Value Date    ALKPHOS 147 12/13/2021     12/13/2021     12/13/2021    PROT 6.0 12/13/2021    BILITOT 3.1 12/13/2021    LABALBU 2.6 12/13/2021     PEEP: 16    Procalcitonin [[id number]] Collected: 12/11/21 9803 Updated: 12/11/21 2115 Specimen Type: Blood Procalcitonin0.03ng/mL   Comment: Suspected Sepsis:   Low likelihood of sepsis  <.50 ng/mL      Impression:  Status post central line placement. There are bilateral alveolar opacities  infiltrates worrisome for viral COVID-19 pneumonia.       D-dimer=1.85      IMPRESSION:    · Critical COVID-19 pneumonia  · Acute respiratory failure with severe hypoxia requiring intubation  · Hypercoagulable state  · Hyperbilirubinemia and elevated liver function tests with severe lymphopenia    Patient Active Problem List   Diagnosis    Pneumonia due to COVID-19 virus    Acute respiratory failure with hypoxia (HCC)    Hypercoagulable state ([address]    Anemia    Anatomical narrow angle, bilateral    Bilateral carpal tunnel syndrome    Bilateral hearing loss    Chronic bilateral low back pain without sciatica    Floaters    Hyperopia    Lamellar macular hole of right eye    Meralgia paraesthetica, left    Murmur    Obesity, Class III, BMI 40-49.9 (morbid obesity) ([address]    Presbyopia of both eyes    Primary osteoarthritis of both knees    Pseudophakia of both eyes    Regular astigmatism    Tinnitus of both ears    Vitreomacular traction syndrome of right eye       PLAN:  · Discontinue baricitinib for elevated liver function test  · Gallbladder ultrasound  · DC IV remdesivir  · Follow-up CBC complete metabolic   · Decadron and anticoagulation as ordered  · Vent support and weaning as tolerated  · Vasopressor support and weaning as tolerated  · Continue droplet plus isolation for COVID-19  · Monitor for superimposed bacterial infection blood clots  · Repeat blood cultures if increased fevers or leukocytosis        Phuong Trejo MD

## 2021-12-13 NOTE — PROGRESS NOTES
Critical Care Progress Note    2021 1:46 PM    Subjective:   Admit Date: 2021  PCP: No primary care provider on file. No chief complaint on file. Interval History: intubated 2 days ago, has been on high vent settings. currently, FiO2 is 70% and PEEP is 14. Sedated with propofol, precedex and paralyzed with Nimbex. UO is 1200 cc. Medications:   Scheduled Meds:   insulin lispro  0-12 Units SubCUTAneous Q4H    famotidine (PEPCID) injection  20 mg IntraVENous BID    chlorhexidine  15 mL Mouth/Throat BID    [Held by provider] baricitinib  4 mg Oral Daily    sodium chloride flush  5-40 mL IntraVENous 2 times per day    enoxaparin  40 mg SubCUTAneous BID    dexamethasone  6 mg IntraVENous Q24H    remdesivir IVPB  100 mg IntraVENous Q24H     Continuous Infusions:   dextrose      dexmedetomidine (PRECEDEX) IV infusion Stopped (21 0204)    propofol 30 mcg/kg/min (21 1027)    fentaNYL (SUBLIMAZE) infusion 150 mcg/hr (21 0705)    norepinephrine 2 mcg/min (21 1233)    cisatracurium (NIMBEX) infusion 1.5 mcg/kg/min (21)    sodium chloride           Objective:   Vitals:   Temp (24hrs), Av.7 °F (37.1 °C), Min:97.3 °F (36.3 °C), Max:99.7 °F (37.6 °C)    BP (!) 94/45   Pulse 70   Temp 99.5 °F (37.5 °C)   Resp (!) 32   Ht 5' 1\" (1.549 m)   Wt 214 lb 4.6 oz (97.2 kg)   SpO2 91%   BMI 40.49 kg/m²   I/O:24HR INTAKE/OUTPUT:      Intake/Output Summary (Last 24 hours) at 2021 1346  Last data filed at 2021 0735  Gross per 24 hour   Intake 1435 ml   Output 1200 ml   Net 235 ml      0701 -  0700  In: 4576 [I.V.:505]  Out: 1200 [Urine:1200]  CVP:          Ventilator Settings:  Vent Mode: AC/VC+  Rate Set: 32 bmp   Vt Ordered: 350 mL       PEEP/CPAP: 14   FiO2 : 70 %     Physical Exam:  General appearance - ill appearing   Mental status - intubated, sedated.     Eyes - pupils equal and reactive, extraocular eye movements intact  Nose - normal and patent  Mouth: ETT  Neck - IJ in place, supple, no significant adenopathy  Chest - diminished B/L.  no wheezes, rales or rhonchi, symmetric air entry  Heart - normal rate, regular rhythm, normal S1, S2, no murmurs, rubs, clicks or gallops  Abdomen - soft, nontender, nondistended, no masses or organomegaly  Rectal - deferred, not clinically indicated  Neurological - intubated and sedated and paralyzed. Musculoskeletal - no joint tenderness, deformity or swelling  Extremities - peripheral pulses normal, no pedal edema  Skin - normal coloration and turgor, no rashes, no suspicious skin lesions noted              BMP:    Recent Labs     12/11/21  0738 12/11/21  1328 12/12/21  0555 12/12/21  0839 12/13/21  0600 12/13/21  0812     --  130*  --  141  --    K 4.3  --  5.7*  --  4.1  --      --  98  --  97  --    CO2 18*  --  19*  --  29  --    BUN 23  --  29*  --  57*  --    CREATININE 0.64   < > 0.94*   < > 1.99* 1.2   GLUCOSE 157*  --  235*  --  109*  --     < > = values in this interval not displayed. .  MG:3,PHOS:3)@  Ionized Calcium: No results found for: IONCA  CBC:   Recent Labs     12/13/21  0600 12/13/21  0600 12/13/21  0812 12/13/21  0845   WBC NOT DONE  --   --  10.5   HGB NOT DONE   < > 12.4 11.9*   PLT NOT DONE  --   --  417*    < > = values in this interval not displayed. ABG: No results for input(s): PH, PCO2, PO2 in the last 72 hours. Assessment and Plan:     Impression:     - Acute hypoxic RF due to severe COVID-19 pneumonia  - Severe covid-19 pneumonia with refractory hypoxia requiring mechanical ventilation  -  Severe sepsis due to above   - hyperkalemia             Recommendations:     - Continue current care in ICU for hemodynamic and airway monitoring.   - Continue ventilator bundle. Maintain  lung protective strategy.    - Continue sedation and paralysis     - Watch for ICU delirium: TV on, natural light, avoid benzos, pain control, early mobility, and family

## 2021-12-13 NOTE — CONSULTS
Palliative Care Consult Note  Patient: Jonathan Olivarez  Gender: female  YOB: 1950  Unit/Bed: IC10/IC10-01  CodeStatus: Full Code  Inpatient Treatment Team: Treatment Team: Attending Provider: Diann Monet DO; Consulting Physician: Rory De Los Santos MD; Utilization Reviewer: Karen Marie, RN; Consulting Physician: Marcy Griffin MD; Utilization Reviewer: Kiki Oppenheim, RN; Registered Nurse: Matilda Chase RN; : Mikaela Hammond; Registered Nurse: Eloy Roblero RN  Admit Date:  12/9/2021    Chief Complaint: Shortness of breath    History of Presenting Illness:      Jonathan Olivarez is a 66 y.o. female on hospital day 4 with a history of anemia, carpal tunnel, heart murmur, OA, and hearing loss. Patient presented to the ER from home with shortness of breath. She started feeling sick 10 days ago with headache, body aches, and fatigue. Patient also reported poor appetite, intermittent chills, fever, and cough. Currently not vaccinated. Patient was admitted to a regular medical floor with COVID-19 pneumonia. She later was transferred to the ICU and intubated. Patient remains sedated and intubated with FiO2 of 70% PEEP of 14. Patient is currently unresponsive. HPI limited due to unresponsive. Spoke with sister, Sylvester Kyle. Sylvester Kyle reports that patient's HPOA is her sister Sam Goltz, however, Sam Goltz is currently awaiting an ICU bed as well at [facility].      Most recent notable labs: ABGs: pH 7.303, PCO2 50, PO2 59, O2 Sat 87, Creat. 1.99, GFR 24.6, albumin 2.6, TB 3.1, Alk Phos. 147, , . Review of Systems:       Review of Systems   Unable to perform ROS: Intubated       Physical Examination:       BP (!) 94/45   Pulse 69   Temp 99.5 °F (37.5 °C)   Resp (!) 32   Ht 5' 1\" (1.549 m)   Wt 214 lb 4.6 oz (97.2 kg)   SpO2 92%   BMI 40.49 kg/m²    Physical Exam  Constitutional:       General: She is not in acute distress.      Interventions: She is sedated and intubated. HENT:      Head: Normocephalic and atraumatic. Nose: No rhinorrhea. Eyes:      General: No scleral icterus. Right eye: No discharge. Left eye: No discharge. Conjunctiva/sclera: Conjunctivae normal.   Cardiovascular:      Rate and Rhythm: Normal rate and regular rhythm. Pulmonary:      Effort: Pulmonary effort is normal. She is intubated. Breath sounds: Decreased breath sounds present. No wheezing. Abdominal:      General: Bowel sounds are normal. There is no distension. Palpations: Abdomen is soft. Tenderness: There is no abdominal tenderness. Musculoskeletal:      Cervical back: Neck supple. Right lower leg: No edema. Left lower leg: No edema. Skin:     General: Skin is warm and dry. Neurological:      Mental Status: She is unresponsive. Comments: Sedated. Psychiatric:         Speech: She is noncommunicative. Cognition and Memory: Cognition is impaired.          Allergies:      No Known Allergies    Medications:      Current Facility-Administered Medications   Medication Dose Route Frequency Provider Last Rate Last Admin    glucose (GLUTOSE) 40 % oral gel 15 g  15 g Oral PRN Elbert Delarosa MD        dextrose 50 % IV solution  12.5 g IntraVENous PRN Elbert Delarosa MD        glucagon (rDNA) injection 1 mg  1 mg IntraMUSCular PRN Elbert Delarosa MD        dextrose 5 % solution  100 mL/hr IntraVENous PRN Elbert Delarosa MD        insulin lispro (HUMALOG) injection vial 0-12 Units  0-12 Units SubCUTAneous Q4H Elbert Delarosa MD   2 Units at 12/12/21 1735    dexmedetomidine (PRECEDEX) 1,000 mcg in sodium chloride 0.9 % 250 mL infusion  0.2-1.4 mcg/kg/hr IntraVENous Continuous Teagan Guerrero MD   Stopped at 12/12/21 0204    famotidine (PEPCID) injection 20 mg  20 mg IntraVENous BID Teagan Guerrero MD   20 mg at 12/12/21 2105    propofol injection  5-50 mcg/kg/min IntraVENous Titrated Teagan Guerrero MD 17 mL/hr at 12/13/21 0527 30 mcg/kg/min at 12/13/21 0527    fentaNYL (SUBLIMAZE) 1,000 mcg in sodium chloride 0.9 % 100 mL infusion  12.5-200 mcg/hr IntraVENous Continuous Amadou Garcia MD 15 mL/hr at 12/13/21 0705 150 mcg/hr at 12/13/21 0705    chlorhexidine (PERIDEX) 0.12 % solution 15 mL  15 mL Mouth/Throat BID Moinca Dexter MD   15 mL at 12/13/21 0836    fentaNYL (SUBLIMAZE) injection 50 mcg  50 mcg IntraVENous Q1H PRN Monica Dexter MD        norepinephrine (LEVOPHED) 16 mg in dextrose 5% 250 mL infusion  2-100 mcg/min IntraVENous Continuous Amadou Garcia MD 3.8 mL/hr at 12/13/21 0823 4 mcg/min at 12/13/21 5534    cisatracurium besylate (NIMBEX) 200 mg in sodium chloride 0.9 % 100 mL infusion  0.5-10 mcg/kg/min IntraVENous Continuous Amadou Garcia MD 4.2 mL/hr at 12/12/21 2116 1.5 mcg/kg/min at 12/12/21 2116    [Held by provider] baricitinib (OLUMIANT) tablet 4 mg  4 mg Oral Daily Bev Brunner MD   4 mg at 12/12/21 0055    sodium chloride flush 0.9 % injection 5-40 mL  5-40 mL IntraVENous 2 times per day Mckayla Grace MD   10 mL at 12/12/21 2110    sodium chloride flush 0.9 % injection 5-40 mL  5-40 mL IntraVENous PRN Mckayla Grace MD        0.9 % sodium chloride infusion  25 mL IntraVENous PRN Mckayla Grace MD        ondansetron (ZOFRAN-ODT) disintegrating tablet 4 mg  4 mg Oral Q8H PRN Mckayla Grace MD        Or    ondansetron [facility]) injection 4 mg  4 mg IntraVENous Q6H PRN Mckayla Grace MD        polyethylene glycol (GLYCOLAX) packet 17 g  17 g Oral Daily PRN Mckayla Grace MD   17 g at 12/12/21 0739    acetaminophen (TYLENOL) tablet 650 mg  650 mg Oral Q6H PRN Mckayla Grace MD        Or    acetaminophen (TYLENOL) suppository 650 mg  650 mg Rectal Q6H PRN Mckayla Grace MD        enoxaparin (LOVENOX) injection 40 mg  40 mg SubCUTAneous BID Mckayla Grace MD   40 mg at 12/12/21 2104    dexamethasone (DECADRON) injection 6 mg  6 mg IntraVENous Q24H Mckayla Grace MD   6 mg at 12/13/21 0216    remdesivir 100 mg in sodium chloride 0.9 % 250 mL IVPB  100 mg IntraVENous Q24H Rizwan Arreola MD   Stopped at 12/12/21 9920       History: PMHx:  Past Medical History:   Diagnosis Date    Headache(784.0)     History of mammography, screening 1990's    History of shingles 2009    Papanicolaou smear for cervical cancer screening     NEVER       PSHx:  Past Surgical History:   Procedure Laterality Date    APPENDECTOMY      BREAST SURGERY  1990s       Social Hx:  Social History     Socioeconomic History    Marital status: Single     Spouse name: None    Number of children: None    Years of education: None    Highest education level: None   Occupational History    None   Tobacco Use    Smoking status: Never Smoker    Smokeless tobacco: Never Used   Substance and Sexual Activity    Alcohol use: No    Drug use: No    Sexual activity: Never   Other Topics Concern    None   Social History Narrative    None     Social Determinants of Health     Financial Resource Strain:     Difficulty of Paying Living Expenses: Not on file   Food Insecurity:     Worried About Running Out of Food in the Last Year: Not on file    Ian of Food in the Last Year: Not on file   Transportation Needs:     Lack of Transportation (Medical): Not on file    Lack of Transportation (Non-Medical):  Not on file   Physical Activity:     Days of Exercise per Week: Not on file    Minutes of Exercise per Session: Not on file   Stress:     Feeling of Stress : Not on file   Social Connections:     Frequency of Communication with Friends and Family: Not on file    Frequency of Social Gatherings with Friends and Family: Not on file    Attends Cheondoism Services: Not on file    Active Member of Clubs or Organizations: Not on file    Attends Club or Organization Meetings: Not on file    Marital Status: Not on file   Intimate Partner Violence:     Fear of Current or Ex-Partner: Not on file    Emotionally Abused: Not on file 12/13/2021    GLUCOSE 109 12/13/2021     TSH: No results found for: TSH  Vitamin B12 and Folate: No components found for: FOLIC,  R52  Urinalysis:   Lab Results   Component Value Date    NITRU Negative 12/09/2021    WBCUA 0-2 12/09/2021    BACTERIA RARE 12/09/2021    BLOODU Trace-intact 12/09/2021    SPECGRAV 1.015 12/09/2021    GLUCOSEU Negative 12/09/2021           FUNCTIONAL ADL´S:     Independent: [  ] Eating, [   ] Dressing, [   ] Transferring, [   ] Rueda Craig, [   ] Honey Memos, [   ] Continence  Dependent   : [ x ] Eating, [ x ] Dressing, [ x ] Transferring, [ x ] Toileting, [ x ] Bathing, [ x ] Continence  W. assistant : [  ] Eating, [   ] Dressing, [   ] Transferring, [   ] Rueda Craig, [   ] Honey Memos, [   ] Continence    Radiology: Reviewed      XR CHEST PORTABLE    Result Date: 12/12/2021  Exam: XR CHEST PORTABLE History:  post line placement Technique: AP portable view of the chest obtained. Comparison: none Chest x-ray portable Findings: The patient is intubated, there is an NG tube coursing towards the stomach    There is a  right internal jugular central line with the tip projecting in the region of the superior vena cava. The cardiomediastinal silhouette is within normal limits. There is no pneumothorax There are bilateral patchy alveolar opacities. There are no pleural effusions. Bones of the thorax appear intact. Status post central line placement. There are bilateral alveolar opacities , infiltrates worrisome for viral COVID-19 pneumonia. XR CHEST PORTABLE    Result Date: 12/12/2021  Exam: XR CHEST PORTABLE History:  f/u intubated yesterday, covid Technique: AP portable view of the chest obtained. Comparison: none Chest x-ray portable Findings: The patient is intubated, there is an NG tube coursing towards the stomach  The cardiomediastinal silhouette is within normal limits. There are bilateral patchy alveolar opacities. There are no pleural effusions. Bones of the thorax appear intact. There are bilateral alveolar opacities , infiltrates worrisome for viral COVID-19 pneumonia. XR CHEST PORTABLE    Result Date: 12/11/2021  Exam: XR CHEST PORTABLE History:  intubation Technique: AP portable view of the chest obtained. Comparison: none Chest x-ray portable Findings: The patient is intubated with the tip of the endotracheal tube 4 cm above the lolly. The cardiac silhouette is at the upper limits of normal in size. There is no pneumothorax. There are bilateral patchy alveolar opacities. Bones of the thorax appear intact. Status post intubation. There are bilateral alveolar opacities , infiltrates worrisome for viral COVID-19 pneumonia. Assessment and plan:  1. Palliative care encounter  Explained the role of palliative care in treating patient. Discussed symptom management related to chronic disease/condition. Provided emotional support and active listening. Patient's sister understands and is agreeable to current plan. Spoke with intensivist, Dr. Abigail Montgomery, for update on patient's condition. Provided update to sister, Vicente Thacker. -Advance Care Planning  Discussed goals of care with patient's sister Vicente Thacker. Patient shall remain a FULL CODE at this time. HPOA in place: Hugh Ruiz (sister). However, she is ill with COVID as well and is waiting for an ICU bed. Solomon Thomas currently at [facility]. Attempted to call her cell phone. If and when Solomon Thomas able to communicate, will see if she is okay with sister Vicente Thacker making decisions since she is currently critically ill also. Vicente Thacker reports another brother and sister in [place]. Spoke with patient's nurse, Sanchez Solitario. Patient's sister, Solomon Thomas, is coming to ICU, and Sanchez Solitario will attempt to get consent from Solomon Thomas to let Vicente Thacker take over decision-making for patient.    -Goals of Care Discussion:  Disease process and goals of treatment were discussed in basic terms.  Vinicio TUCKER's goal is to optimize available comfort care measures and

## 2021-12-13 NOTE — PROGRESS NOTES
Department of Internal Medicine  General Internal Medicine  Attending Progress Note      SUBJECTIVE:  Pt seen through ICU doors to reduce risk of exposure to COVID-19. SHLOMO today. Started IVF as UOP poor today.      OBJECTIVE      Medications    Current Facility-Administered Medications: [START ON 12/14/2021] famotidine (PEPCID) injection 20 mg, 20 mg, IntraVENous, Daily  0.9 % sodium chloride infusion, , IntraVENous, Continuous  glucose (GLUTOSE) 40 % oral gel 15 g, 15 g, Oral, PRN  dextrose 50 % IV solution, 12.5 g, IntraVENous, PRN  glucagon (rDNA) injection 1 mg, 1 mg, IntraMUSCular, PRN  dextrose 5 % solution, 100 mL/hr, IntraVENous, PRN  insulin lispro (HUMALOG) injection vial 0-12 Units, 0-12 Units, SubCUTAneous, Q4H  dexmedetomidine (PRECEDEX) 1,000 mcg in sodium chloride 0.9 % 250 mL infusion, 0.2-1.4 mcg/kg/hr, IntraVENous, Continuous  propofol injection, 5-50 mcg/kg/min, IntraVENous, Titrated  fentaNYL (SUBLIMAZE) 1,000 mcg in sodium chloride 0.9 % 100 mL infusion, 12.5-200 mcg/hr, IntraVENous, Continuous  chlorhexidine (PERIDEX) 0.12 % solution 15 mL, 15 mL, Mouth/Throat, BID  fentaNYL (SUBLIMAZE) injection 50 mcg, 50 mcg, IntraVENous, Q1H PRN  norepinephrine (LEVOPHED) 16 mg in dextrose 5% 250 mL infusion, 2-100 mcg/min, IntraVENous, Continuous  cisatracurium besylate (NIMBEX) 200 mg in sodium chloride 0.9 % 100 mL infusion, 0.5-10 mcg/kg/min, IntraVENous, Continuous  [Held by provider] baricitinib (OLUMIANT) tablet 4 mg, 4 mg, Oral, Daily  sodium chloride flush 0.9 % injection 5-40 mL, 5-40 mL, IntraVENous, 2 times per day  sodium chloride flush 0.9 % injection 5-40 mL, 5-40 mL, IntraVENous, PRN  0.9 % sodium chloride infusion, 25 mL, IntraVENous, PRN  ondansetron (ZOFRAN-ODT) disintegrating tablet 4 mg, 4 mg, Oral, Q8H PRN **OR** ondansetron (ZOFRAN) injection 4 mg, 4 mg, IntraVENous, Q6H PRN  polyethylene glycol (GLYCOLAX) packet 17 g, 17 g, Oral, Daily PRN  acetaminophen (TYLENOL) tablet 650 mg, 650 mg, Oral, Q6H PRN **OR** acetaminophen (TYLENOL) suppository 650 mg, 650 mg, Rectal, Q6H PRN  enoxaparin (LOVENOX) injection 40 mg, 40 mg, SubCUTAneous, BID  dexamethasone (DECADRON) injection 6 mg, 6 mg, IntraVENous, Q24H  Physical    VITALS:  BP (!) 94/45   Pulse 75   Temp 99.5 °F (37.5 °C)   Resp (!) 32   Ht 5' 1\" (1.549 m)   Wt 214 lb 4.6 oz (97.2 kg)   SpO2 95%   BMI 40.49 kg/m²   Constitutional: intubated and sedated  Head: Normocephalic, atraumatic  ENT: ETT in place  Neck: neck supple, trachea midline  Lungs: non labored  Heart: RRR on tele  GI: non-distended  MSK: no edema noted     Data    CBC:   Lab Results   Component Value Date    WBC 10.5 12/13/2021    RBC 4.19 12/13/2021    HGB 11.9 12/13/2021    HCT 36.9 12/13/2021    MCV 88.0 12/13/2021    MCH 28.4 12/13/2021    MCHC 32.3 12/13/2021    RDW 14.1 12/13/2021     12/13/2021     CMP:    Lab Results   Component Value Date     12/13/2021    K 4.1 12/13/2021    CL 97 12/13/2021    CO2 29 12/13/2021    BUN 57 12/13/2021    CREATININE 1.2 12/13/2021    CREATININE 1.99 12/13/2021    GFRAA 53 12/13/2021    LABGLOM 44 12/13/2021    GLUCOSE 109 12/13/2021    PROT 6.0 12/13/2021    LABALBU 2.6 12/13/2021    CALCIUM 8.1 12/13/2021    BILITOT 3.1 12/13/2021    ALKPHOS 147 12/13/2021     12/13/2021     12/13/2021       ASSESSMENT AND PLAN      # Acute hypoxic respiratory failure 2/2 COVID-19 pneumonia  - cont Remdesivir (day 4/5), decadron  - increasing vent demand today - on nimbex. Proning per pulm recs  - pulm consulted  - ID consulted  - pressors as needed    # SHLOMO  - 2/2 dehydration vs hypotension  - Cr 1.99 today from normal yesterday  - had poor PO intake prior to intubation.  On tube feeds, but worsening UOP  - started on IVF  - monitor with labs - replace electrolytes as needed  - will consider nephrology consult if worsening    # Hyperglycemia and new onset DM  - initially thought 2/2 steroids, but A1C 7.1 so pt is now diabetic  - monitor, ISS    DVT: lovenox    Disposition: Continues to require ICU care. IVF started for SHLOMO. Pulm, ID consulted.        April Caballero DO  Internal Medicine   Hospitalist    >35 minutes in total care time

## 2021-12-13 NOTE — PROCEDURES
PROCEDURE:   Radial artery line placement. (A-line)  [id number]    INDICATION: Frequent ABGS       CONSENT: The patient was counseled regarding the procedure, it's indications, risks, potential complications and alternatives and any questions were answered. Consent was obtained. nt. PROCEDURE SUMMARY:   Radial arteries were assessed by palpation and ultrasound localization. Laddie Purl test was done to asses collateral circulation. The patient was prepped and draped in the usual sterile manner using chlorhexidine scrub. 1% lidocaine was used to numb the region. The left  radial artery was palpated and successfully cannulated on the first pass. Pulsatile, arterial blood was visualized and the artery was then threaded using the Seldinger technique and a catheter was then sutured into place. Good wave-form was obtained. The patient tolerated the procedure well without any immediate complications. The area was cleaned and Tegaderm was applied.      ESTIMATED BLOOD LOSS:   Minimal    COMPLICATIONS:  No immediate complication     Sabino Jon, TEJINDER - CNP

## 2021-12-14 NOTE — PROGRESS NOTES
19:00-07:30 Shift summary:    Pt had uneventful night. Pt remains on droplet plus isolation d/t dx of COVID-19. Assessments completed (see flowsheets). Pt remains intubated, sedated, paralyzed and in prone position. IV drips infusing/titrated per orders (see eMAR), pt tolerating well. OG remains in place, T/F infusing per orders. OG placement verified via respiratory status, external length of 62cm, and residual of 20ml. AM labs drawn labeled and sent to lab. Handoff report given to on coming RN.

## 2021-12-14 NOTE — PROGRESS NOTES
[id number] Shift assessment performed, patient currently on nimbex (SEE TOF flowsheet), propofol, and fentanyl and titrated per response.  Tube feed infusing     1200 - Patient turned to supine position at this time

## 2021-12-14 NOTE — PROGRESS NOTES
mL       PEEP/CPAP: 10   FiO2 : 60 %     Physical Exam:  General appearance - ill appearing   Mental status - intubated, sedated. Eyes - pupils equal and reactive, extraocular eye movements intact  Nose - normal and patent  Mouth: ETT  Neck - IJ in place, supple, no significant adenopathy  Chest - diminished B/L.  no wheezes, rales or rhonchi, symmetric air entry  Heart - normal rate, regular rhythm, normal S1, S2, no murmurs, rubs, clicks or gallops  Abdomen - soft, nontender, nondistended, no masses or organomegaly  Rectal - deferred, not clinically indicated  Neurological - intubated and sedated and paralyzed. Musculoskeletal - no joint tenderness, deformity or swelling  Extremities - peripheral pulses normal, no pedal edema  Skin - normal coloration and turgor, no rashes, no suspicious skin lesions noted              BMP:    Recent Labs     12/12/21  0555 12/12/21  0839 12/13/21  0600 12/13/21  0812 12/14/21  0600 12/14/21  0808   *   < > 141  --  143  --    K 5.7*   < > 4.1  --  4.7  --    CL 98   < > 97  --  110*  --    CO2 19*  --  29  --  23  --    BUN 29*   < > 57*  --  40*  --    CREATININE 0.94*   < > 1.99*   < > 1.00* 1.1   GLUCOSE 235*   < > 109*  --  192*  --     < > = values in this interval not displayed. .  MG:3,PHOS:3)@  Ionized Calcium: No results found for: IONCA  CBC:   Recent Labs     12/13/21  0845 12/13/21  1552 12/14/21  0600 12/14/21  0808   WBC 10.5  --  11.6*  --    HGB 11.9*   < > 11.9* 13.4   *  --  449*  --     < > = values in this interval not displayed. ABG: No results for input(s): PH, PCO2, PO2 in the last 72 hours. Assessment and Plan:     Impression:     - Acute hypoxic RF due to severe COVID-19 pneumonia. - Severe covid-19 pneumonia with refractory hypoxia requiring mechanical ventilation  - ARDS, due to # 2  - Severe sepsis due to above   - Hyperkalemia.  Improved   - Anemia, due to acute illness   - Protein caloric malnutrition Recommendations:     - Continue current care in ICU for hemodynamic and airway monitoring.   - Continue ventilator bundle. Maintain  lung protective strategy. Dropped FiO2 to 60 and PEEP to 12.   - Continue sedation and paralysis     - Watch for ICU delirium: TV on, natural light, avoid benzos, pain control, early mobility, and family engagement  - Strict I/O. Watch kidney function daily  - DVT and GI  Prophylaxis   -  maintain on ntube feeding       Prophylaxis:  Stress ulcer: [] PPI Agent [] H2RA [] Sucralfate [] Other:   VTE: [] Enoxaparin  [] SC Heparin  [] SCD      Full Code      Excluding procedures, the total critical care time caring for this patient with life threatening, unstable organ failure, including direct patient contact, review of medical record, management of life support systems, review of data including imaging and labs, discussions with other team members, patient's family and physicians at least 31 minutes so far today.         Electronically signed by Kathrin Escobedo MD on 12/14/2021 at 3:01 PM

## 2021-12-14 NOTE — FLOWSHEET NOTE
Shift summary [id number]    Assumed care 0730 vss. Weaning levo as able. VSS. Remains on vent, supine. Paralyzed, sedated. TOF 4/4, paralytic increased. AM assessment complete, see flowsheet. Repeat TOF remains 4/4, paralytic increased then TOF 1-2/4. LS dim with rhonchi lower. No secretions from ETT, oral care done. Repositioned, meds per MAR. Tolerating TF with scant residuals. Spoke with pt sister Nereida Marie, updated. Verification of presence form scanned to her email per request.     Art line place around 1530 to L radial.  Per repeat ABG's PF lower, plan to prone this evening. proned @ 1800, facial pillow applied. Pt tolerated well. I/o's done. 125 cc out from draper, message to hospitalist, awaiting orders. Pt sister Emily Lyons [facility]) also admitted to this unit. This RN spoke with Emily Lyons face to face and discussed adding Nereida Marie on to assume HCPOA as Emily Lyons is also sick. Emily  signed form transferring POA to her sister Timothy Larson, this RN acted as witness along with chaplain Hiram. Form placed in pt chart.  Electronically signed by Cornel Guerra RN on 12/13/2021 at 9:48 PM

## 2021-12-14 NOTE — PROGRESS NOTES
PHARMACY NOTE:   Interdisciplinary Rounds Completed     ICU Day #3     Changes made today by Pharmacy per verbal:   Ordered Senokot and Miralax daily  One time dose of Lasix 40 mEq ordered      Additional information:   Steroid: Decadron 6 mg daily until 12/20  Insulin coverage:   SSI: Medium Dose  Lantus: NA  Humalog: 10 units in the past 24 hours  Pressors: None currently running at this time per [facility]; active order for Levophed  Sedation: Fentanyl, Nimbex   Antimicrobial therapy: remdesivir and baricitinib discontinued per ID due to increasing LFT's.  ID following    Core measures assessed/met    Quintin LoganD  PGY-1 Pharmacy Resident  12/14/2021 2:20 PM

## 2021-12-14 NOTE — PROGRESS NOTES
Infectious Diseases Inpatient Progress Note          HISTORY OF PRESENT ILLNESS:  Follow up critical COVID-19 pneumonia with severe hypoxia requiring intubation on IV remdesivir, oral baricitinib, Decadron and Lovenox subcu, well tolerated. Patient is currently intubated and sedated on assist control 60%. On Levophed at 4 mics  Hyperbilirubinemia and elevated liver function tests    no fevers  Current Medications:     [START ON 12/15/2021] polyethylene glycol  17 g Oral Daily    senna  5 mL Oral BID    famotidine (PEPCID) injection  20 mg IntraVENous Daily    insulin lispro  0-12 Units SubCUTAneous Q4H    chlorhexidine  15 mL Mouth/Throat BID    [Held by provider] baricitinib  4 mg Oral Daily    sodium chloride flush  5-40 mL IntraVENous 2 times per day    enoxaparin  40 mg SubCUTAneous BID    dexamethasone  6 mg IntraVENous Q24H       Allergies:  Patient has no known allergies. Review of Systems  unable to provide ROS because of sedation      Physical Exam  Vitals:    12/14/21 0830 12/14/21 0845 12/14/21 0859 12/14/21 0900   BP:       Pulse: 77 72 73 74   Resp: (!) 32 (!) 32 (!) 32 (!) 32   Temp:    96.6 °F (35.9 °C)   TempSrc:       SpO2: 98% 98% 98% 98%   Weight:       Height:         General Appearance: Intubated and sedated, on assist control 60%. Currently proned  Nonresponsive to verbal stimulation  Skin: warm and dry, no rash.    Head: normocephalic and atraumatic  Eyes: anicteric sclerae  ENT: Intact ET and OG  Lungs: normal respiratory effort, bilateral rales  Heart normal S1-S2 no murmur  Abdomen: soft, no distention or rigidity  No leg edema  No erythema  Clear urine without sediment      DATA:    Lab Results   Component Value Date    WBC 11.6 (H) 12/14/2021    HGB 13.4 12/14/2021    HCT 37.4 12/14/2021    MCV 88.6 12/14/2021     (H) 12/14/2021     Lab Results   Component Value Date    CREATININE 1.1 12/14/2021    BUN 40 (H) 12/14/2021     12/14/2021    K 4.7 12/14/2021  (H) 12/14/2021    CO2 23 12/14/2021       Hepatic Function Panel:  Lab Results   Component Value Date    ALKPHOS 118 12/14/2021    ALT 63 12/14/2021    AST 22 12/14/2021    PROT 5.8 12/14/2021    BILITOT 0.3 12/14/2021    LABALBU 2.7 12/14/2021   D-dimer=1.85    Gallbladder ultrasound      Impression       CHOLELITHIASIS WITHIN A MILDLY DISTENDED GALLBLADDER.       NO BILIARY DILATATION OR OTHER FINDINGS OF ACUTE CHOLECYSTITIS, WITHIN THE LIMITS OF THE STUDY.       Blood cultures are negative  Urinalysis negative    IMPRESSION:    Critical COVID-19 pneumonia  Acute respiratory failure with severe hypoxia requiring intubation  Hypercoagulable state  Hyperbilirubinemia and elevated liver function tests with severe lymphopenia, improving    Patient Active Problem List   Diagnosis    Pneumonia due to COVID-19 virus    Acute respiratory failure with hypoxia (HCC)    Hypercoagulable state (HCC)    Anemia    Anatomical narrow angle, bilateral    Bilateral carpal tunnel syndrome    Bilateral hearing loss    Chronic bilateral low back pain without sciatica    Floaters    Hyperopia    Lamellar macular hole of right eye    Meralgia paraesthetica, left    Murmur    Obesity, Class III, BMI 40-49.9 (morbid obesity) (HCC)    Presbyopia of both eyes    Primary osteoarthritis of both knees    Pseudophakia of both eyes    Regular astigmatism    Tinnitus of both ears    Vitreomacular traction syndrome of right eye    Elevated liver function tests    Advance care planning    Goals of care, counseling/discussion    Palliative care encounter       PLAN:  Follow-up CBC complete metabolic   Decadron and anticoagulation as ordered  Vent support and weaning as tolerated  Vasopressor support and weaning as tolerated  Continue droplet plus isolation for COVID-19  Monitor off antibiotics since there is no indication of ongoing bacterial infection at this point.   Follow-up blood cultures done 2 days ago        Kwadwo Rojo Briseyda Rea MD

## 2021-12-14 NOTE — PROGRESS NOTES
Department of Internal Medicine  General Internal Medicine  Attending Progress Note      SUBJECTIVE:  Pt seen through ICU doors to reduce risk of exposure to COVID-19. SHLOMO improved today after 1L NS yesterday. UOP still extremely poor.  Discussed IVF with intensivist - will wait on IVF unless renal function worsening as total input is high with drips, TF     OBJECTIVE      Medications    Current Facility-Administered Medications: [START ON 12/15/2021] polyethylene glycol (GLYCOLAX) packet 17 g, 17 g, Oral, Daily  senna (SENOKOT) 8.8 MG/5ML syrup 8.8 mg, 5 mL, Oral, BID  famotidine (PEPCID) injection 20 mg, 20 mg, IntraVENous, Daily  glucose (GLUTOSE) 40 % oral gel 15 g, 15 g, Oral, PRN  dextrose 50 % IV solution, 12.5 g, IntraVENous, PRN  glucagon (rDNA) injection 1 mg, 1 mg, IntraMUSCular, PRN  dextrose 5 % solution, 100 mL/hr, IntraVENous, PRN  insulin lispro (HUMALOG) injection vial 0-12 Units, 0-12 Units, SubCUTAneous, Q4H  dexmedetomidine (PRECEDEX) 1,000 mcg in sodium chloride 0.9 % 250 mL infusion, 0.2-1.4 mcg/kg/hr, IntraVENous, Continuous  propofol injection, 5-50 mcg/kg/min, IntraVENous, Titrated  fentaNYL (SUBLIMAZE) 1,000 mcg in sodium chloride 0.9 % 100 mL infusion, 12.5-200 mcg/hr, IntraVENous, Continuous  chlorhexidine (PERIDEX) 0.12 % solution 15 mL, 15 mL, Mouth/Throat, BID  fentaNYL (SUBLIMAZE) injection 50 mcg, 50 mcg, IntraVENous, Q1H PRN  norepinephrine (LEVOPHED) 16 mg in dextrose 5% 250 mL infusion, 2-100 mcg/min, IntraVENous, Continuous  cisatracurium besylate (NIMBEX) 200 mg in sodium chloride 0.9 % 100 mL infusion, 0.5-10 mcg/kg/min, IntraVENous, Continuous  [Held by provider] baricitinib (OLUMIANT) tablet 4 mg, 4 mg, Oral, Daily  sodium chloride flush 0.9 % injection 5-40 mL, 5-40 mL, IntraVENous, 2 times per day  sodium chloride flush 0.9 % injection 5-40 mL, 5-40 mL, IntraVENous, PRN  0.9 % sodium chloride infusion, 25 mL, IntraVENous, PRN  ondansetron (ZOFRAN-ODT) disintegrating tablet 4 mg, 4 mg, Oral, Q8H PRN **OR** ondansetron (ZOFRAN) injection 4 mg, 4 mg, IntraVENous, Q6H PRN  acetaminophen (TYLENOL) tablet 650 mg, 650 mg, Oral, Q6H PRN **OR** acetaminophen (TYLENOL) suppository 650 mg, 650 mg, Rectal, Q6H PRN  enoxaparin (LOVENOX) injection 40 mg, 40 mg, SubCUTAneous, BID  dexamethasone (DECADRON) injection 6 mg, 6 mg, IntraVENous, Q24H  Physical    VITALS:  BP (!) 96/53   Pulse 94   Temp 96.6 °F (35.9 °C)   Resp (!) 32   Ht 5' 1\" (1.549 m)   Wt 218 lb 11.1 oz (99.2 kg)   SpO2 91%   BMI 41.32 kg/m²   Constitutional: intubated and sedated  Head: Normocephalic, atraumatic  ENT: ETT in place  Neck: neck supple, trachea midline  Lungs: non labored  Heart: RRR on tele  GI: non-distended  MSK: no edema noted     Data    CBC:   Lab Results   Component Value Date    WBC 11.6 12/14/2021    RBC 4.22 12/14/2021    HGB 13.4 12/14/2021    HCT 37.4 12/14/2021    MCV 88.6 12/14/2021    MCH 28.2 12/14/2021    MCHC 31.8 12/14/2021    RDW 13.9 12/14/2021     12/14/2021     CMP:    Lab Results   Component Value Date     12/14/2021    K 4.7 12/14/2021     12/14/2021    CO2 23 12/14/2021    BUN 40 12/14/2021    CREATININE 1.1 12/14/2021    CREATININE 1.00 12/14/2021    GFRAA 59 12/14/2021    LABGLOM 49 12/14/2021    GLUCOSE 192 12/14/2021    PROT 5.8 12/14/2021    LABALBU 2.7 12/14/2021    CALCIUM 7.8 12/14/2021    BILITOT 0.3 12/14/2021    ALKPHOS 118 12/14/2021    AST 22 12/14/2021    ALT 63 12/14/2021       ASSESSMENT AND PLAN      # Acute hypoxic respiratory failure 2/2 COVID-19 pneumonia  - cont Remdesivir (day 5/5), decadron  - increasing vent demand today - on nimbex. Proning per pulm recs  - pulm consulted  - ID consulted  - pressors as needed    # SHLOMO- improved today  - 2/2 dehydration vs hypotension  - Cr 1.00 from 1.99 yesterday sp 1L NS. Will monitor off fluids as total input is high due to drips, TF.  Discussed with intensivist who would prefer to keep patient on the drier side to ease weaning  - had poor PO intake prior to intubation. On tube feeds, but worsening UOP- continues to be very poor today. Continue to monitor  - monitor with labs - replace electrolytes as needed    # Hyperglycemia and new onset DM  - initially thought 2/2 steroids, but A1C 7.1 so pt is now diabetic  - monitor, ISS    DVT: lovenox    Disposition: Continues to require ICU care. Holding off IVF as renal function improved today. Continuing to monitor UOP which has been very poor. Pulm, ID consulted.        Xavier Landau, DO  Internal Medicine   Hospitalist    >35 minutes in total care time

## 2021-12-15 NOTE — PLAN OF CARE
Nutrition Problem #1: Inadequate oral intake  Intervention: Food and/or Nutrient Delivery: Modify Tube Feeding (Continue Peptide Based High Protein TF @ 20 ml/hr, Add 2 protein modulars with water flush once daily.   100 ml water flush every 6 hours)  Nutritional Goals: new goal: Pt to meet estimated energy/protein needs via EN

## 2021-12-15 NOTE — PROGRESS NOTES
PHARMACY NOTE:   Interdisciplinary Rounds Completed     ICU Day #4     Changes made today by Pharmacy per verbal:   Lantus 10 ordered  Lactulose until BM added      Additional information:   Steroid: Decadron 6 mg daily until   Insulin coverage: SSI: Medium Dose. Humalo units in the past 24 hours. Lantus added per above. Pressors: Levo @ 10 per rounds. Sedation: Fentanyl, Nimbex. Precedex OFF and still active f/u on d/c. Antimicrobial therapy: remdesivir and baricitinib discontinued per ID due to increasing LFT's.  ID following    Core measures assessed/met    [facility] PharmD

## 2021-12-15 NOTE — PROGRESS NOTES
Department of Internal Medicine  General Internal Medicine  Attending Progress Note      SUBJECTIVE:  Pt seen through ICU doors to reduce risk of exposure to COVID-19. Overnight, draper noted to not be draining despite irrigation. Sediment noted to be impacted in draper which was replaced and UOP immediately improved. Continues to be on pressors.  Proned today on 50% FiO2 down from 70% yesterday    OBJECTIVE      Medications    Current Facility-Administered Medications: sodium chloride 0.9 % infusion, , ,   insulin glargine (LANTUS) injection vial 10 Units, 10 Units, SubCUTAneous, Nightly  lactulose (CHRONULAC) 10 GM/15ML solution 20 g, 20 g, Oral, TID  piperacillin-tazobactam (ZOSYN) 3,375 mg in dextrose 5 % 50 mL IVPB extended infusion (mini-bag), 3,375 mg, IntraVENous, Q8H  polyethylene glycol (GLYCOLAX) packet 17 g, 17 g, Oral, Daily  senna (SENOKOT) 8.8 MG/5ML syrup 8.8 mg, 5 mL, Oral, BID  famotidine (PEPCID) injection 20 mg, 20 mg, IntraVENous, Daily  glucose (GLUTOSE) 40 % oral gel 15 g, 15 g, Oral, PRN  dextrose 50 % IV solution, 12.5 g, IntraVENous, PRN  glucagon (rDNA) injection 1 mg, 1 mg, IntraMUSCular, PRN  dextrose 5 % solution, 100 mL/hr, IntraVENous, PRN  insulin lispro (HUMALOG) injection vial 0-12 Units, 0-12 Units, SubCUTAneous, Q4H  dexmedetomidine (PRECEDEX) 1,000 mcg in sodium chloride 0.9 % 250 mL infusion, 0.2-1.4 mcg/kg/hr, IntraVENous, Continuous  propofol injection, 5-50 mcg/kg/min, IntraVENous, Titrated  fentaNYL (SUBLIMAZE) 1,000 mcg in sodium chloride 0.9 % 100 mL infusion, 12.5-200 mcg/hr, IntraVENous, Continuous  chlorhexidine (PERIDEX) 0.12 % solution 15 mL, 15 mL, Mouth/Throat, BID  fentaNYL (SUBLIMAZE) injection 50 mcg, 50 mcg, IntraVENous, Q1H PRN  norepinephrine (LEVOPHED) 16 mg in dextrose 5% 250 mL infusion, 2-100 mcg/min, IntraVENous, Continuous  cisatracurium besylate (NIMBEX) 200 mg in sodium chloride 0.9 % 100 mL infusion, 0.5-10 mcg/kg/min, IntraVENous, Continuous  [Held by provider] baricitinib (OLUMIANT) tablet 4 mg, 4 mg, Oral, Daily  sodium chloride flush 0.9 % injection 5-40 mL, 5-40 mL, IntraVENous, 2 times per day  sodium chloride flush 0.9 % injection 5-40 mL, 5-40 mL, IntraVENous, PRN  0.9 % sodium chloride infusion, 25 mL, IntraVENous, PRN  ondansetron (ZOFRAN-ODT) disintegrating tablet 4 mg, 4 mg, Oral, Q8H PRN **OR** ondansetron (ZOFRAN) injection 4 mg, 4 mg, IntraVENous, Q6H PRN  acetaminophen (TYLENOL) tablet 650 mg, 650 mg, Oral, Q6H PRN **OR** acetaminophen (TYLENOL) suppository 650 mg, 650 mg, Rectal, Q6H PRN  enoxaparin (LOVENOX) injection 40 mg, 40 mg, SubCUTAneous, BID  dexamethasone (DECADRON) injection 6 mg, 6 mg, IntraVENous, Q24H  Physical    VITALS:  BP (!) 118/54   Pulse 83   Temp 97.7 °F (36.5 °C)   Resp 25   Ht 5' 1\" (1.549 m)   Wt 210 lb 15.7 oz (95.7 kg)   SpO2 96%   BMI 39.86 kg/m²   Constitutional: intubated and sedated, proned  Head: Normocephalic, atraumatic  ENT: ETT in place  Neck: neck supple, trachea midline  Lungs: non labored  Heart: RRR on tele  GI: non-distended  MSK: no edema noted     Data    CBC:   Lab Results   Component Value Date    WBC 19.3 12/15/2021    RBC 4.48 12/15/2021    HGB 14.7 12/15/2021    HCT 39.3 12/15/2021    MCV 87.7 12/15/2021    MCH 28.2 12/15/2021    MCHC 32.1 12/15/2021    RDW 14.2 12/15/2021     12/15/2021     CMP:    Lab Results   Component Value Date     12/15/2021    K 4.3 12/15/2021     12/15/2021    CO2 25 12/15/2021    BUN 45 12/15/2021    CREATININE 1.1 12/15/2021    CREATININE 1.19 12/15/2021    GFRAA 59 12/15/2021    LABGLOM 49 12/15/2021    GLUCOSE 262 12/15/2021    PROT 6.1 12/15/2021    LABALBU 2.6 12/15/2021    CALCIUM 8.2 12/15/2021    BILITOT 0.4 12/15/2021    ALKPHOS 125 12/15/2021    AST 26 12/15/2021    ALT 64 12/15/2021       ASSESSMENT AND PLAN      # Acute hypoxic respiratory failure 2/2 COVID-19 pneumonia  - cont Remdesivir (day 5/5), decadron  - increasing vent demand today - on nimbex. Proning per pulm recs  - pulm consulted  - ID consulted  - pressors as needed    # SHLOMO- improved today  - 2/2 dehydration vs hypotension  - Cr 1.19 today. Will monitor off fluids as total input is high due to drips, TF. Discussed with intensivist who would prefer to keep patient on the drier side to ease weaning  - had poor PO intake prior to intubation. On tube feeds, but worsening UOP- improved today after draper exchange  - monitor with labs - replace electrolytes as needed    # Hyperglycemia and new onset DM  - initially thought 2/2 steroids, but A1C 7.1 so pt is now diabetic  - monitor, ISS    DVT: lovenox    Disposition: Continues to require ICU care. Holding off IVF as renal function improved today. Continuing to monitor UOP. Pulm, ID consulted.        Radames Gastelum DO  Internal Medicine   Hospitalist    >35 minutes in total care time

## 2021-12-15 NOTE — PROGRESS NOTES
From: Home-     PMH: Shingles, headaches, ANEMIA    Anticipated Discharge Date: TBD    Anticipated Discharge Disposition: TBD    Patient Mobility or PT/OT ordered: NA - On vent. CONS ID, PULM, PALL CARE    Covid Test Date and Result: 11/29 Home COVID test - positive. 12/1121 INTUBATED.      Barriers to Discharge: VENT INCREASED FIO2 AND PEEP,  PRONING, MULTIPLE GTTS, COVID MEDS,   PALL CARE SPOKE WITH SISTER FORREST MADI RE PROGNOSIS

## 2021-12-15 NOTE — PROGRESS NOTES
Infectious Diseases Inpatient Progress Note          HISTORY OF PRESENT ILLNESS:    Patient is requiring to be paralyzed in order to be proned  Currently with thick copious drainage from the nose  Has worsening leukocytosis  follow up critical COVID-19 pneumonia with severe hypoxia requiring intubation off IV remdesivir, oral baricitinib, discontinued because of elevated liver function test  Currently on Decadron and Lovenox subcu, well tolerated. Patient is currently intubated and sedated on assist control 50%. Currently on no pressors  Hyperbilirubinemia and elevated liver function tests    no fevers  Current Medications:     insulin glargine  10 Units SubCUTAneous Nightly    lactulose  20 g Oral TID    piperacillin-tazobactam  3,375 mg IntraVENous Q8H    polyethylene glycol  17 g Oral Daily    senna  5 mL Oral BID    famotidine (PEPCID) injection  20 mg IntraVENous Daily    insulin lispro  0-12 Units SubCUTAneous Q4H    chlorhexidine  15 mL Mouth/Throat BID    [Held by provider] baricitinib  4 mg Oral Daily    sodium chloride flush  5-40 mL IntraVENous 2 times per day    enoxaparin  40 mg SubCUTAneous BID    dexamethasone  6 mg IntraVENous Q24H       Allergies:  Patient has no known allergies. Review of Systems  unable to provide ROS because of sedation      Physical Exam  Vitals:    12/15/21 1215 12/15/21 1322 12/15/21 1323 12/15/21 1345   BP:    (!) 118/54   Pulse: 105 109 111 83   Resp: (!) 32 (!) 32 (!) 32 25   Temp:    97.7 °F (36.5 °C)   TempSrc:       SpO2: 93% 92% 92% 96%   Weight:       Height:         General Appearance: Intubated and sedated, on assist control 50%. Currently proned  Copious amount of thick yellow-green drainage from the nose  Nonresponsive to verbal stimulation  Skin: warm and dry, no rash.    Head: normocephalic and atraumatic  Eyes: anicteric sclerae  ENT: Intact ET and OG  Lungs: normal respiratory effort, bilateral rales  Heart normal S1-S2 no murmur  Abdomen: soft, no distention or rigidity  Bilateral leg swelling   no erythema  Clear urine with small amount of sediment      DATA:    Lab Results   Component Value Date    WBC 19.3 (H) 12/15/2021    HGB 14.7 12/15/2021    HCT 39.3 12/15/2021    MCV 87.7 12/15/2021     (H) 12/15/2021     Lab Results   Component Value Date    CREATININE 1.1 12/15/2021    BUN 45 (H) 12/15/2021     12/15/2021    K 4.3 12/15/2021     12/15/2021    CO2 25 12/15/2021       Hepatic Function Panel:  Lab Results   Component Value Date    ALKPHOS 125 12/15/2021    ALT 64 12/15/2021    AST 26 12/15/2021    PROT 6.1 12/15/2021    BILITOT 0.4 12/15/2021    LABALBU 2.6 12/15/2021   [id number] on December 13  IMPRESSION:    · Critical COVID-19 pneumonia  · Acute respiratory failure with severe hypoxia requiring intubation  · Hypercoagulable state  · Acute sinusitis  · Hyperbilirubinemia and elevated liver function tests with severe lymphopenia, improving    Patient Active Problem List   Diagnosis    Pneumonia due to COVID-19 virus    Acute respiratory failure with hypoxia ([address]    Hypercoagulable state ([address]    Anemia    Anatomical narrow angle, bilateral    Bilateral carpal tunnel syndrome    Bilateral hearing loss    Chronic bilateral low back pain without sciatica    Floaters    Hyperopia    Lamellar macular hole of right eye    Meralgia paraesthetica, left    Murmur    Obesity, Class III, BMI 40-49.9 (morbid obesity) (HCC)    Presbyopia of both eyes    Primary osteoarthritis of both knees    Pseudophakia of both eyes    Regular astigmatism    Tinnitus of both ears    Vitreomacular traction syndrome of right eye    Elevated liver function tests    Advance care planning    Goals of care, counseling/discussion    Palliative care encounter       PLAN:  · Panculture including blood urine sputum and nasal drainage   · IV Zosyn   · Procalcitonin and ferritin in a.m.   · follow-up CBC complete metabolic · Decadron and anticoagulation as ordered  · Vent support and weaning as tolerated  · Continue droplet plus isolation for COVID-19  ·         Duarte Min MD

## 2021-12-15 NOTE — PROGRESS NOTES
Palliative Care Progress Note  Patient: Stefania Rebolledo  Gender: female  YOB: 1950  Unit/Bed: IC10/IC10-01  CodeStatus: Full Code  Inpatient Treatment Team: Treatment Team: Attending Provider: Deborah Angeles DO; Consulting Physician: Dinorah Gaines MD; Utilization Reviewer: Jed Lopez RN; Consulting Physician: Ernesto Hidalgo MD; Utilization Reviewer: Onesimo Cordova RN; : Viry Schrader; Unit Clerk: Nadia Mason; Registered Nurse: Sommer Sanchez RN  Admit Date:  12/9/2021    Chief Complaint: Shortness of breath    History of Presenting Illness:      Stefania Rebolledo is a 66 y.o. female on hospital day 6 with a history of anemia, carpal tunnel, heart murmur, OA, and hearing loss. Patient presented to the ER from home with shortness of breath. She started feeling sick 10 days ago with headache, body aches, and fatigue. Patient also reported poor appetite, intermittent chills, fever, and cough. Currently not vaccinated. Patient was admitted to a regular medical floor with COVID-19 pneumonia. She later was transferred to the ICU and intubated. Patient remains sedated and intubated with FiO2 of 70% PEEP of 14. Patient is currently unresponsive. HPI limited due to unresponsive. Spoke with sister, Carolyn Boast. Carolyn Boast reports that patient's HPOA is her sister Maria Guadalupe May, however, Maria Guadalupe May is currently awaiting an ICU bed as well at [facility].      Most recent notable labs: ABGs: pH 7.303, PCO2 50, PO2 59, O2 Sat 87, Creat. 1.99, GFR 24.6, albumin 2.6, TB 3.1, Alk Phos. 147, , .    12/15/21:  Patient was proned and back to supine. Vent support was decreased until placed supine again. She remains on sedation and unresponsive. When proned, FiO2 was at 50% and PEEP at 10. Most recent notable labs: pO2, arterial 69, O2 Sat, Arterial 91, creat. 1.19, GFR 44.6, BUN 45, albumin 2.6, AST 64, WBC 19.3.     Review of Systems:       Review of Systems   Unable to perform ROS: Intubated       Physical Examination:       BP (!) 118/54   Pulse 83   Temp 97.7 °F (36.5 °C)   Resp 25   Ht 5' 1\" (1.549 m)   Wt 210 lb 15.7 oz (95.7 kg)   SpO2 96%   BMI 39.86 kg/m²    Physical Exam  Constitutional:       General: She is not in acute distress. Interventions: She is sedated and intubated. HENT:      Head: Normocephalic and atraumatic. Nose: No rhinorrhea. Eyes:      General: No scleral icterus. Right eye: No discharge. Left eye: No discharge. Conjunctiva/sclera: Conjunctivae normal.   Cardiovascular:      Rate and Rhythm: Normal rate and regular rhythm. Pulmonary:      Effort: She is intubated. Breath sounds: Decreased breath sounds and rhonchi present. Abdominal:      General: Bowel sounds are normal. There is no distension. Palpations: Abdomen is soft. Tenderness: There is no abdominal tenderness. Musculoskeletal:      Cervical back: Neck supple. Right lower leg: No edema. Left lower leg: No edema. Skin:     General: Skin is warm and dry. Neurological:      Mental Status: She is unresponsive. Comments: Sedated. Psychiatric:         Speech: She is noncommunicative. Cognition and Memory: Cognition is impaired.          Allergies:      No Known Allergies    Medications:      Current Facility-Administered Medications   Medication Dose Route Frequency Provider Last Rate Last Admin    sodium chloride 0.9 % infusion             insulin glargine (LANTUS) injection vial 10 Units  10 Units SubCUTAneous Nightly Umm Farfan MD        lactulose (CHRONULAC) 10 GM/15ML solution 20 g  20 g Oral TID Umm Farfan MD   20 g at 12/15/21 1350    piperacillin-tazobactam (ZOSYN) 3,375 mg in dextrose 5 % 50 mL IVPB extended infusion (mini-bag)  3,375 mg IntraVENous Q8H Nadege Brunner MD        polyethylene glycol (GLYCOLAX) packet 17 g  17 g Oral Daily Lola Lung, APRN - CNP   17 g at 12/15/21 1050    senna 12/15/21 1059    sodium chloride flush 0.9 % injection 5-40 mL  5-40 mL IntraVENous PRN Jesus Holm MD        0.9 % sodium chloride infusion  25 mL IntraVENous PRN Jesus Holm MD        ondansetron (ZOFRAN-ODT) disintegrating tablet 4 mg  4 mg Oral Q8H PRN Jesus Holm MD        Or    [facility]etron [facility]) injection 4 mg  4 mg IntraVENous Q6H PRN Jesus Holm MD        acetaminophen (TYLENOL) tablet 650 mg  650 mg Oral Q6H PRN Jesus Holm MD        Or    acetaminophen (TYLENOL) suppository 650 mg  650 mg Rectal Q6H PRN Jesus Holm MD        enoxaparin (LOVENOX) injection 40 mg  40 mg SubCUTAneous BID Jesus Hlom MD   40 mg at 12/15/21 1050    dexamethasone (DECADRON) injection 6 mg  6 mg IntraVENous Q24H Jesus Holm MD   6 mg at 12/15/21 0142       History:       PMHx:  Past Medical History:   Diagnosis Date    Bilateral carpal tunnel syndrome 11/15/2019    Chronic bilateral low back pain without sciatica 11/15/2019    Headache(784.0)     History of mammography, screening 1990's    History of shingles 2009    Obesity, Class III, BMI 40-49.9 (morbid obesity) [facility] 3/27/2018    Papanicolaou smear for cervical cancer screening     NEVER    Primary osteoarthritis of both knees 5/25/2016       PSHx:  Past Surgical History:   Procedure Laterality Date    APPENDECTOMY      BREAST SURGERY  1990s       Social Hx:  Social History     Socioeconomic History    Marital status: Single     Spouse name: None    Number of children: None    Years of education: None    Highest education level: None   Occupational History    None   Tobacco Use    Smoking status: Never Smoker    Smokeless tobacco: Never Used   Substance and Sexual Activity    Alcohol use: No    Drug use: No    Sexual activity: Never   Other Topics Concern    None   Social History Narrative    None     Social Determinants of Health     Financial Resource Strain:     Difficulty of Paying Living Expenses: Not on file Food Insecurity:     Worried About Running Out of Food in the Last Year: Not on file    Ian of Food in the Last Year: Not on file   Transportation Needs:     Lack of Transportation (Medical): Not on file    Lack of Transportation (Non-Medical):  Not on file   Physical Activity:     Days of Exercise per Week: Not on file    Minutes of Exercise per Session: Not on file   Stress:     Feeling of Stress : Not on file   Social Connections:     Frequency of Communication with Friends and Family: Not on file    Frequency of Social Gatherings with Friends and Family: Not on file    Attends Taoist Services: Not on file    Active Member of [address] [place] [address] [employer] or Organizations: Not on file    Attends Club or Organization Meetings: Not on file    Marital Status: Not on file   Intimate Partner Violence:     Fear of Current or Ex-Partner: Not on file    Emotionally Abused: Not on file    Physically Abused: Not on file    Sexually Abused: Not on file   Housing Stability:     Unable to Pay for Housing in the Last Year: Not on file    Number of [place] in the Last Year: Not on file    Unstable Housing in the Last Year: Not on file       Family Hx:  Family History   Problem Relation Age of Onset    High Blood Pressure Mother        LABS: Reviewed     CBC:  Lab Results   Component Value Date    WBC 19.3 12/15/2021    RBC 4.48 12/15/2021    HGB 14.7 12/15/2021    HCT 39.3 12/15/2021    MCV 87.7 12/15/2021    MCH 28.2 12/15/2021    MCHC 32.1 12/15/2021    RDW 14.2 12/15/2021     12/15/2021     CBC with Differential:   Lab Results   Component Value Date    WBC 19.3 12/15/2021    RBC 4.48 12/15/2021    HGB 14.7 12/15/2021    HCT 39.3 12/15/2021     12/15/2021    MCV 87.7 12/15/2021    MCH 28.2 12/15/2021    MCHC 32.1 12/15/2021    RDW 14.2 12/15/2021    BANDSPCT 3 12/15/2021    METASPCT 2 12/15/2021    LYMPHOPCT 1.0 12/15/2021    PROMYELOPCT 1 12/14/2021    MONOPCT 3.8 12/15/2021    MYELOPCT 1 12/14/2021 jugular central line with the tip projecting in the region of the superior vena cava. The cardiomediastinal silhouette is within normal limits. There is no pneumothorax There are bilateral patchy alveolar opacities. There are no pleural effusions. Bones of the thorax appear intact. Status post central line placement. There are bilateral alveolar opacities , infiltrates worrisome for viral COVID-19 pneumonia. XR CHEST PORTABLE    Result Date: 12/12/2021  Exam: XR CHEST PORTABLE History:  f/u intubated yesterday, covid Technique: AP portable view of the chest obtained. Comparison: none Chest x-ray portable Findings: The patient is intubated, there is an NG tube coursing towards the stomach  The cardiomediastinal silhouette is within normal limits. There are bilateral patchy alveolar opacities. There are no pleural effusions. Bones of the thorax appear intact. There are bilateral alveolar opacities , infiltrates worrisome for viral COVID-19 pneumonia. XR CHEST PORTABLE    Result Date: 12/11/2021  Exam: XR CHEST PORTABLE History:  intubation Technique: AP portable view of the chest obtained. Comparison: none Chest x-ray portable Findings: The patient is intubated with the tip of the endotracheal tube 4 cm above the lolly. The cardiac silhouette is at the upper limits of normal in size. There is no pneumothorax. There are bilateral patchy alveolar opacities. Bones of the thorax appear intact. Status post intubation. There are bilateral alveolar opacities , infiltrates worrisome for viral COVID-19 pneumonia. Assessment and plan:    12/15/21:  Discussed care and provided update to patient's sister, Lex Gonsales. All questions answered. Will write letter for Eden's travel insurance, as she was suppose to go on a cruise but had to cancel due to her sister's condition and needing to make healthcare decisions for her.      1. Palliative care encounter  Explained the role of palliative care in treating patient. Discussed symptom management related to chronic disease/condition. Provided emotional support and active listening. Patient's sister understands and is agreeable to current plan. Spoke with intensivist, Dr. Teri Le, for update on patient's condition. Provided update to sister, Jeane Pastrana. -Advance Care Planning  Discussed goals of care with patient's sister Jeane Pastrana. Patient shall remain a FULL CODE at this time. HPOA in place: Olivia Spicer (sister). However, she is ill with COVID as well and is waiting for an ICU bed. Odell Castellano currently at [facility]. Attempted to call her cell phone. If and when Odell Castellano able to communicate, will see if she is okay with sister Jeane Pastrana making decisions since she is currently critically ill also. Jeane Pastrana reports another brother and sister in [place]. Spoke with patient's nurse, Lisette Jacobo. Patient's sister, Odell Castellano, is coming to ICU, and Lisette Jacobo will attempt to get consent from Odell Castellano to let Jeane Pastrana take over decision-making for patient.    -Goals of Care Discussion:  Disease process and goals of treatment were discussed in basic terms. Anthony TUCKER's goal is to optimize available comfort care measures and continue with full treatment of COVID-19 infection. We discussed the palliative care philosophy in light of those goals. We discussed all care options contingent on treatment response and QOL. Much active listening, presence, and emotional support were given. Patient is being treated for multiple medical conditions this admission includin. Acute respiratory failure with hypoxia secondary to COVID-19 pneumonia  2. Hyperkalemia  3. Steroid induced hyperglycemia  4. Hypercoagulable state    Total Time: 35 minutes with >50% spent counseling/care coordination.      Electronically signed by TEJINDER Joyner CNP on 12/15/2021 at 3:49 PM

## 2021-12-15 NOTE — PROGRESS NOTES
Comprehensive Nutrition Assessment    Type and Reason for Visit:  Reassess    Nutrition Recommendations/Plan:   · Continue Peptide Based High Protein TF @ 20 ml/hr  · Add 2 protein modulars with water flush once daily. · 100 ml water flush every 6 hours    Nutrition Assessment:  Pt remains intubated, tolerating TF at trophic rate. Pt on proning schedule, will add protein modulars to aid in meeting energy/protein needs and continue to monitor    Malnutrition Assessment:  Malnutrition Status:  Insufficient data    Context:  Acute Illness     Findings of the 6 clinical characteristics of malnutrition:  Energy Intake:  Unable to assess  Weight Loss:  Unable to assess     Body Fat Loss:  Unable to assess     Muscle Mass Loss:  Unable to assess    Fluid Accumulation:  Unable to assess     Strength:  Not Performed    Estimated Daily Nutrient Needs:  Energy (kcal):  0482-3861 (kg x 11-14); Weight Used for Energy Requirements:  Current (94.2 kg)     Protein (g):  95 gm (kg IBW x 2.0); Weight Used for Protein Requirements:  Ideal (47.7 kg)        Fluid (ml/day):  ~1200 ml (or as per MD); Method Used for Fluid Requirements:  1 ml/kcal      Nutrition Related Findings:  intubated (12/11), No significant PMH,  labs:elevated BUN/CR, Gluc (189-239),  HbA1C (12/13) 7.1-- ?  DM, propofol @ 19.8 ml/hr (522 kcal), no edema noted, CVC line, OGT in place, meds include nimbex, Glycolax/Senna-S, lactulose added (12/15) TID until BM, last BM (12/9), Proning schedule      Wounds:  None       Current Nutrition Therapies:    Diet NPO  ADULT TUBE FEEDING; Orogastric; Peptide Based High Protein; Continuous; 20; No; 100; Q 6 hours  Current Tube Feeding (TF) Orders:  · Feeding Route: Orogastric  · Formula: Peptide Based High Protein  · Schedule: Continuous @ 20 ml/hr x 24 hrs (480 ml)  · Additives/Modulars: Protein (x 2 = 208 kcal, 52 gm protein)  · Water Flushes: 100 ml every 6 hrs (400 ml)  · Current TF & Flush Orders Provides: 480

## 2021-12-16 NOTE — PROGRESS NOTES
Critical Care Progress Note    2021 3:51 PM    Subjective:   Admit Date: 2021  PCP: No primary care provider on file. No chief complaint on file. Interval History: Intubated 2 days ago, has been on high vent settings. currently, FiO2 is 70% and PEEP is 14. Sedated with propofol, precedex and paralyzed with Nimbex. UO is 1200 cc.      21:  Continues to be intubated and sedated. Has been proned. ABG this AM improved. Down to 60% and PEEP of 12. UP is low but kidney function has been stable. 12/15/21: She is currently on 50% and 10 of PEEP. Blood gases this morning are marginal.  Urine output is good. BUN is slightly worse which could be due to steroids. 21: continues to be on on vent with high settings. Is currently prone. Kidney function stabilized. Urine output still low.       Medications:   Scheduled Meds:   famotidine (PEPCID) injection  10 mg IntraVENous Daily    enoxaparin  40 mg SubCUTAneous Daily    meropenem  1,000 mg IntraVENous Q12H    insulin glargine  10 Units SubCUTAneous Nightly    lactulose  20 g Oral TID    polyethylene glycol  17 g Oral Daily    senna  5 mL Oral BID    insulin lispro  0-12 Units SubCUTAneous Q4H    chlorhexidine  15 mL Mouth/Throat BID    [Held by provider] baricitinib  4 mg Oral Daily    sodium chloride flush  5-40 mL IntraVENous 2 times per day    dexamethasone  6 mg IntraVENous Q24H     Continuous Infusions:   dextrose      dexmedetomidine (PRECEDEX) IV infusion Stopped (21 0204)    propofol 35 mcg/kg/min (21 0247)    fentaNYL (SUBLIMAZE) infusion 150 mcg/hr (21 0956)    norepinephrine 8 mcg/min (21 0522)    cisatracurium (NIMBEX) infusion 1.5 mcg/kg/min (12/15/21 7872)    sodium chloride           Objective:   Vitals:   Temp (24hrs), Av.4 °F (36.9 °C), Min:97.7 °F (36.5 °C), Max:98.8 °F (37.1 °C)    BP (!) 189/66   Pulse 70   Temp 97.7 °F (36.5 °C)   Resp (!) 34   Ht 5' 1\" (1.549 m) Wt 210 lb 15.7 oz (95.7 kg)   SpO2 98%   BMI 39.86 kg/m²   I/O:24HR INTAKE/OUTPUT:      Intake/Output Summary (Last 24 hours) at 12/16/2021 1551  Last data filed at 12/16/2021 0549  Gross per 24 hour   Intake 1974 ml   Output 1510 ml   Net 464 ml     12/15 0701 - 12/16 0700  In: 1974 [I.V.:1020]  Out: 1510 [Urine:1510]  CVP:          Ventilator Settings:  Vent Mode: AC/VC+  Rate Set: 34 bmp   Vt Ordered: 350 mL       PEEP/CPAP: 12   FiO2 : 100 %     Physical Exam:  General appearance - ill appearing   Mental status - intubated, sedated. Eyes - pupils equal and reactive, extraocular eye movements intact  Nose - normal and patent  Mouth: ETT  Neck - IJ in place, supple, no significant adenopathy  Chest - diminished B/L.  no wheezes, rales or rhonchi, symmetric air entry  Heart - normal rate, regular rhythm, normal S1, S2, no murmurs, rubs, clicks or gallops  Abdomen - soft, nontender, nondistended, no masses or organomegaly  Rectal - deferred, not clinically indicated  Neurological - intubated and sedated and paralyzed. Musculoskeletal - no joint tenderness, deformity or swelling  Extremities - peripheral pulses normal, no pedal edema  Skin - normal coloration and turgor, no rashes, no suspicious skin lesions noted              BMP:    Recent Labs     12/14/21  0600 12/14/21  0808 12/15/21  0600 12/15/21  0910 12/16/21  0747 12/16/21  1035      < > 140  --   --  142   K 4.7   < > 4.3  --   --  4.9   *   < > 106  --   --  106   CO2 23  --  25  --   --  25   BUN 40*   < > 45*  --   --  53*   CREATININE 1.00*   < > 1.19*   < > 2.2* 1.96*   GLUCOSE 192*   < > 262*  --   --  197*    < > = values in this interval not displayed. .  MG:3,PHOS:3)@  Ionized Calcium: No results found for: IONCA  CBC:   Recent Labs     12/15/21  0600 12/15/21  0910 12/16/21  0747 12/16/21  1035   WBC 19.3*  --   --  19.8*   HGB 12.6   < > 13.0 11.7*   *  --   --  522*    < > = values in this interval not displayed. ABG: No results for input(s): PH, PCO2, PO2 in the last 72 hours. Assessment and Plan:     Impression:     - Acute hypoxic RF due to severe COVID-19 pneumonia. Some improvement in lung mechanics  - Severe covid-19 pneumonia with refractory hypoxia requiring mechanical ventilation  - ARDS, due to # 2  - Severe sepsis due to above   - Hyperkalemia. Improved   - Anemia, due to acute illness   - Protein caloric malnutrition  - Mild SHLOMO             Recommendations:     - Continue current care in ICU for hemodynamic and airway monitoring.   - Continue ventilator bundle. Maintain  lung protective strategy. Continue proning. She will be supine later today. - Continue sedation and paralysis     - continue steroids  - Watch for ICU delirium  - Strict I/O. Watch kidney function daily  - DVT and GI  Prophylaxis   -  maintain on ntube feeding       Prophylaxis:  Stress ulcer: [] PPI Agent [] H2RA [] Sucralfate [] Other:   VTE: [] Enoxaparin  [] SC Heparin  [] SCD      Full Code      Excluding procedures, the total critical care time caring for this patient with life threatening, unstable organ failure, including direct patient contact, review of medical record, management of life support systems, review of data including imaging and labs, discussions with other team members, patient's family and physicians at least 31 minutes so far today.         Electronically signed by Daria Conde MD on 12/16/2021 at 3:51 PM

## 2021-12-16 NOTE — PROGRESS NOTES
Department of Internal Medicine  General Internal Medicine  Attending Progress Note      SUBJECTIVE:  Pt seen through ICU doors to reduce risk of exposure to COVID-19.  Proned today on 90% FiO2 up from 50% yesterday    OBJECTIVE      Medications    Current Facility-Administered Medications: famotidine (PEPCID) injection 10 mg, 10 mg, IntraVENous, Daily  enoxaparin (LOVENOX) injection 40 mg, 40 mg, SubCUTAneous, Daily  insulin glargine (LANTUS) injection vial 10 Units, 10 Units, SubCUTAneous, Nightly  lactulose (CHRONULAC) 10 GM/15ML solution 20 g, 20 g, Oral, TID  piperacillin-tazobactam (ZOSYN) 3,375 mg in dextrose 5 % 50 mL IVPB extended infusion (mini-bag), 3,375 mg, IntraVENous, Q8H  polyethylene glycol (GLYCOLAX) packet 17 g, 17 g, Oral, Daily  senna (SENOKOT) 8.8 MG/5ML syrup 8.8 mg, 5 mL, Oral, BID  glucose (GLUTOSE) 40 % oral gel 15 g, 15 g, Oral, PRN  dextrose 50 % IV solution, 12.5 g, IntraVENous, PRN  glucagon (rDNA) injection 1 mg, 1 mg, IntraMUSCular, PRN  dextrose 5 % solution, 100 mL/hr, IntraVENous, PRN  insulin lispro (HUMALOG) injection vial 0-12 Units, 0-12 Units, SubCUTAneous, Q4H  dexmedetomidine (PRECEDEX) 1,000 mcg in sodium chloride 0.9 % 250 mL infusion, 0.2-1.4 mcg/kg/hr, IntraVENous, Continuous  propofol injection, 5-50 mcg/kg/min, IntraVENous, Titrated  fentaNYL (SUBLIMAZE) 1,000 mcg in sodium chloride 0.9 % 100 mL infusion, 12.5-200 mcg/hr, IntraVENous, Continuous  chlorhexidine (PERIDEX) 0.12 % solution 15 mL, 15 mL, Mouth/Throat, BID  fentaNYL (SUBLIMAZE) injection 50 mcg, 50 mcg, IntraVENous, Q1H PRN  norepinephrine (LEVOPHED) 16 mg in dextrose 5% 250 mL infusion, 2-100 mcg/min, IntraVENous, Continuous  cisatracurium besylate (NIMBEX) 200 mg in sodium chloride 0.9 % 100 mL infusion, 0.5-10 mcg/kg/min, IntraVENous, Continuous  [Held by provider] baricitinib (OLUMIANT) tablet 4 mg, 4 mg, Oral, Daily  sodium chloride flush 0.9 % injection 5-40 mL, 5-40 mL, IntraVENous, 2 times per day  sodium chloride flush 0.9 % injection 5-40 mL, 5-40 mL, IntraVENous, PRN  0.9 % sodium chloride infusion, 25 mL, IntraVENous, PRN  ondansetron (ZOFRAN-ODT) disintegrating tablet 4 mg, 4 mg, Oral, Q8H PRN **OR** ondansetron (ZOFRAN) injection 4 mg, 4 mg, IntraVENous, Q6H PRN  acetaminophen (TYLENOL) tablet 650 mg, 650 mg, Oral, Q6H PRN **OR** acetaminophen (TYLENOL) suppository 650 mg, 650 mg, Rectal, Q6H PRN  dexamethasone (DECADRON) injection 6 mg, 6 mg, IntraVENous, Q24H  Physical    VITALS:  BP (!) 189/66   Pulse 70   Temp 97.7 °F (36.5 °C)   Resp (!) 34   Ht 5' 1\" (1.549 m)   Wt 210 lb 15.7 oz (95.7 kg)   SpO2 98%   BMI 39.86 kg/m²   Constitutional: intubated and sedated, proned  Head: Normocephalic, atraumatic  ENT: ETT in place  Neck: neck supple, trachea midline  Lungs: non labored  Heart: RRR on tele  GI: non-distended  MSK: no edema noted     Data    CBC:   Lab Results   Component Value Date    WBC 19.8 12/16/2021    RBC 4.23 12/16/2021    HGB 11.7 12/16/2021    HCT 38.0 12/16/2021    MCV 89.8 12/16/2021    MCH 27.5 12/16/2021    MCHC 30.7 12/16/2021    RDW 14.6 12/16/2021     12/16/2021     CMP:    Lab Results   Component Value Date     12/16/2021    K 4.9 12/16/2021     12/16/2021    CO2 25 12/16/2021    BUN 53 12/16/2021    CREATININE 1.96 12/16/2021    CREATININE 2.2 12/16/2021    GFRAA 30.3 12/16/2021    LABGLOM 25.1 12/16/2021    GLUCOSE 197 12/16/2021    PROT 6.0 12/16/2021    LABALBU 2.4 12/16/2021    CALCIUM 8.8 12/16/2021    BILITOT 0.5 12/16/2021    ALKPHOS 130 12/16/2021    AST 23 12/16/2021    ALT 59 12/16/2021       ASSESSMENT AND PLAN      # Acute hypoxic respiratory failure 2/2 COVID-19 pneumonia  - completed Remdesivir, continue decadron  - increasing vent demand today - on nimbex.  Proning per pulm recs  - ABG today 7.17/76/131  - pulm consulted  - ID consulted  - pressors as needed    # SHLOMO  - 2/2 dehydration vs hypotension  - Cr 1.96 today  - monitor UOP  - monitor with labs - replace electrolytes as needed    # Hyperglycemia and new onset DM  - initially thought 2/2 steroids, but A1C 7.1 so pt is now diabetic  - monitor, ISS    DVT: lovenox    Disposition: Continues to require ICU care. Continuing to monitor UOP. Pulm, ID consulted.        Liset Harman DO  Internal Medicine   Hospitalist    >35 minutes in total care time

## 2021-12-16 NOTE — PROGRESS NOTES
Patient ID:    Winston Wu  [id number]  1 y.o.  1950     Assumed Care of Patient. Report Received from RN. Assessment Complete, please see flow sheets. Labs, orders, plan of care and meds reviewed. IV fluids and ventilator settings verified. OG verified for placement. Minimal residual noted on pullback. Patient remains in prone position, reverse trendelenburg. Patient train of four is 2 on 5 amps. Nimbex remains. 0000 patient assessed. ABG drawn by respiratory therapist.  Critical result called in to Dr. Soumya Stout. Per physician, increase the rate to 34. Respiratory therapist notified. Nasal swab obtained and sent to lab.       Electronically signed by Tootie Nails RN

## 2021-12-16 NOTE — PROGRESS NOTES
Infectious Diseases Inpatient Progress Note          HISTORY OF PRESENT ILLNESS:    Patient is requiring to be paralyzed in order to be proned. Remains afebrile. On assist control 90%. On Levophed 8 mics, currently proned  Currently with thick copious drainage from the nose  Has persistent leukocytosis  Has acute kidney injury  follow up critical COVID-19 pneumonia with severe hypoxia requiring intubation off IV remdesivir, oral baricitinib, discontinued because of elevated liver function test  Currently on Decadron and Lovenox subcu, well tolerated. Patient is currently intubated and sedated     no fevers  Current Medications:     famotidine (PEPCID) injection  10 mg IntraVENous Daily    enoxaparin  40 mg SubCUTAneous Daily    insulin glargine  10 Units SubCUTAneous Nightly    lactulose  20 g Oral TID    piperacillin-tazobactam  3,375 mg IntraVENous Q8H    polyethylene glycol  17 g Oral Daily    senna  5 mL Oral BID    insulin lispro  0-12 Units SubCUTAneous Q4H    chlorhexidine  15 mL Mouth/Throat BID    [Held by provider] baricitinib  4 mg Oral Daily    sodium chloride flush  5-40 mL IntraVENous 2 times per day    dexamethasone  6 mg IntraVENous Q24H       Allergies:  Patient has no known allergies. Review of Systems  unable to provide ROS because of sedation      Physical Exam  Vitals:    12/16/21 1200 12/16/21 1300 12/16/21 1400 12/16/21 1500   BP:       Pulse: 70 70 68 70   Resp: (!) 34 (!) 34 (!) 34 (!) 34   Temp: 97.7 °F (36.5 °C)      TempSrc:       SpO2:       Weight:       Height:         General Appearance: Intubated and sedated, on assist control 90%. Currently proned  Nonresponsive to verbal stimulation  Skin: warm and dry, no rash.    Head: normocephalic and atraumatic  Eyes: anicteric sclerae  ENT: Intact ET and OG  Lungs: normal respiratory effort, bilateral scattered rhonchi  Heart normal S1-S2 no murmur  Abdomen: soft, no distention or rigidity  Bilateral leg swelling   no erythema  Cloudy urine with small amount of sediment      DATA:    Lab Results   Component Value Date    WBC 19.8 (H) 12/16/2021    HGB 11.7 (L) 12/16/2021    HCT 38.0 12/16/2021    MCV 89.8 12/16/2021     (H) 12/16/2021     Lab Results   Component Value Date    CREATININE 1.96 (H) 12/16/2021    BUN 53 (H) 12/16/2021     12/16/2021    K 4.9 12/16/2021     12/16/2021    CO2 25 12/16/2021       Hepatic Function Panel:  Lab Results   Component Value Date    ALKPHOS 130 12/16/2021    ALT 59 12/16/2021    AST 23 12/16/2021    PROT 6.0 12/16/2021    BILITOT 0.5 12/16/2021    LABALBU 2.4 12/16/2021     Microscopic Urinalysis [[id number]] (Abnormal) Collected: 12/15/21 1817 Updated: 12/15/21 1856 RBC, [id number] Abnormal /HPF Bacteria, UANegative/HPF Hyaline Casts, UA10-20/HPF WBC, UA20-50 Abnormal /HPF Epithelial Cells, UA0-2/HPF Urinalysis Reflex to Culture [[id number]] (Abnormal) Collected: 12/15/21 1817 Updated: 12/15/21 1856 Specimen Source: Urine, clean catch Color, UAYellow Clarity, UACLOUDY Abnormal  Glucose, UrNegativemg/dL Bilirubin UrineNegative Ketones, UrineNegativemg/dL Specific Gravity, UA1.019 Blood, UrineMODERATE Abnormal  pH, UA5.0 Protein, UA30 Abnormal mg/dL Urobilinogen, Urine1.0E.U./dL Nitrite, UrineNegative Leukocyte Esterase, UrineSMALL Abnormal  Urine Reflex to CultureYes   Procalcitonin [[id number]] (Abnormal) Collected: 12/16/21 1035 Updated: 12/16/21 1315 Procalcitonin5.53 High ng/mL   Comment: Suspected Sepsis:   Low likelihood of sepsis  <.50 ng/mL      ProcedureComponentValueUnitsDate/Time Culture, Respiratory [[id number]][id number]: 12/15/21 1813 Order Status: SentSpecimen: Lung from EndotrachealUpdated: 12/16/21 1354 Gram Stain ResultModerate WBC's   No epithelial cells   Many Gram positive cocci in clusters-resembling Staph   Narrative:    Xjigbhsw=[id number] on December 13  IMPRESSION:    · Critical COVID-19 pneumonia with superimposed bacterial pneumonia  · Acute respiratory failure with severe hypoxia requiring intubation  · Hypercoagulable state  · Acute sinusitis  · Hyperbilirubinemia and elevated liver function tests with severe lymphopenia, improving  · Acute kidney injury    Patient Active Problem List   Diagnosis    Pneumonia due to COVID-19 virus    Acute respiratory failure with hypoxia ([address]    Hypercoagulable state ([address]    Anemia    Anatomical narrow angle, bilateral    Bilateral carpal tunnel syndrome    Bilateral hearing loss    Chronic bilateral low back pain without sciatica    Floaters    Hyperopia    Lamellar macular hole of right eye    Meralgia paraesthetica, left    Murmur    Obesity, Class III, BMI 40-49.9 (morbid obesity) ([address]    Presbyopia of both eyes    Primary osteoarthritis of both knees    Pseudophakia of both eyes    Regular astigmatism    Tinnitus of both ears    Vitreomacular traction syndrome of right eye    Elevated liver function tests    Advance care planning    Goals of care, counseling/discussion    Palliative care encounter    Acute bacterial sinusitis       PLAN:  · Check blood urine sputum and nasal drainage   · Change Zosyn to meropenem because of acute kidney injury  · Check ferritin. · follow-up CBC complete metabolic   · Decadron and anticoagulation as ordered  · Vent support and weaning as tolerated  · Continue droplet plus isolation for COVID-19  · Chest x-ray in a.m.         Corona Hua MD

## 2021-12-16 NOTE — PROGRESS NOTES
Palliative Care Progress Note  Patient: Sumit Amin  Gender: female  YOB: 1950  Unit/Bed: IC10/IC10-01  CodeStatus: Full Code  Inpatient Treatment Team: Treatment Team: Attending Provider: Radames Gastelum DO; Consulting Physician: Vicky Carvajal MD; Utilization Reviewer: Annie Olsen, RN; Consulting Physician: Jules Hartley MD; Utilization Reviewer: Hema Teran RN; : Jessica Garcia; Registered Nurse: Jannette Akins RN  Admit Date:  12/9/2021    Chief Complaint: Shortness of breath    History of Presenting Illness:      Sumit Amin is a 66 y.o. female on hospital day 7 with a history of anemia, carpal tunnel, heart murmur, OA, and hearing loss. Patient presented to the ER from home with shortness of breath. She started feeling sick 10 days ago with headache, body aches, and fatigue. Patient also reported poor appetite, intermittent chills, fever, and cough. Currently not vaccinated. Patient was admitted to a regular medical floor with COVID-19 pneumonia. She later was transferred to the ICU and intubated. Patient remains sedated and intubated with FiO2 of 70% PEEP of 14. Patient is currently unresponsive. HPI limited due to unresponsive. Spoke with sister, Dusty Mercer. Dusty Mercer reports that patient's HPOA is her sister Mathew Rodrigues, however, Mathew Rodrigues is currently awaiting an ICU bed as well at [facility].      Most recent notable labs: ABGs: pH 7.303, PCO2 50, PO2 59, O2 Sat 87, Creat. 1.99, GFR 24.6, albumin 2.6, TB 3.1, Alk Phos. 147, , .    12/15/21:  Patient was proned and back to supine. Vent support was decreased until placed supine again. She remains on sedation and unresponsive. When proned, FiO2 was at 50% and PEEP at 10. Most recent notable labs: pO2, arterial 69, O2 Sat, Arterial 91, creat. 1.19, GFR 44.6, BUN 45, albumin 2.6, AST 64, WBC 19.3.    12/16/21:  Patient currently proned, intubated, and sedated. She is unresponsive. FiO2 at 90%. Most recent notable labs: WBC 19.8, 5.53, creat. 1.96, GFR 25.1, BUN 53, Albumin 2.4, ALT 59, pH 7.172, pCO2 76, pO2 131. Review of Systems:       Review of Systems   Unable to perform ROS: Intubated       Physical Examination:       BP (!) 189/66   Pulse 79   Temp 98.2 °F (36.8 °C)   Resp (!) 34   Ht 5' 1\" (1.549 m)   Wt 210 lb 15.7 oz (95.7 kg)   SpO2 98%   BMI 39.86 kg/m²    Physical Exam  Constitutional:       Appearance: She is ill-appearing. Interventions: She is sedated and intubated. HENT:      Head: Normocephalic and atraumatic. Nose: No rhinorrhea. Eyes:      General:         Right eye: No discharge. Comments: Unable to assess due to prone position. Neck:      Comments: Unable to assess due to prone position. Cardiovascular:      Rate and Rhythm: Normal rate and regular rhythm. Pulmonary:      Effort: She is intubated. Breath sounds: Decreased breath sounds present. Abdominal:      Comments: Unable to assess due to prone position. Genitourinary:     Comments: Serna with clear yellow urine. Musculoskeletal:      Right lower leg: No edema. Left lower leg: No edema. Skin:     General: Skin is warm and dry. Neurological:      Mental Status: She is unresponsive. Comments: Sedated. Psychiatric:         Speech: She is noncommunicative. Cognition and Memory: Cognition is impaired.          Allergies:      No Known Allergies    Medications:      Current Facility-Administered Medications   Medication Dose Route Frequency Provider Last Rate Last Admin    famotidine (PEPCID) injection 10 mg  10 mg IntraVENous Daily [facility]   10 mg at 12/16/21 0949    enoxaparin (LOVENOX) injection 40 mg  40 mg SubCUTAneous Daily [facility]   40 mg at 12/16/21 0948    insulin glargine (LANTUS) injection vial 10 Units  10 Units SubCUTAneous Nightly Fredi Denny MD   10 Units at 12/15/21 2029    lactulose ([facility]) 10 GM/15ML solution 20 g  20 g Oral TID Shira Burleson MD   20 g at 12/16/21 0948    piperacillin-tazobactam (ZOSYN) 3,375 mg in dextrose 5 % 50 mL IVPB extended infusion (mini-bag)  3,375 mg IntraVENous Q8H Pasquale Galarza MD   Stopped at 12/16/21 1057    polyethylene glycol (GLYCOLAX) packet 17 g  17 g Oral Daily Elesa Challenger, APRN - CNP   17 g at 12/16/21 0949    senna (SENOKOT) 8.8 MG/5ML syrup 8.8 mg  5 mL Oral BID Elesa Challenger, APRN - CNP   8.8 mg at 12/16/21 0948    glucose (GLUTOSE) 40 % oral gel 15 g  15 g Oral PRN Cristina Ann MD        dextrose 50 % IV solution  12.5 g IntraVENous PRN Cristina Ann MD        glucagon (rDNA) injection 1 mg  1 mg IntraMUSCular PRN Cristina Ann MD        dextrose 5 % solution  100 mL/hr IntraVENous PRN Cristina Ann MD        insulin lispro (HUMALOG) injection vial 0-12 Units  0-12 Units SubCUTAneous Q4H Cristina Ann MD   4 Units at 12/16/21 0951    dexmedetomidine (PRECEDEX) 1,000 mcg in sodium chloride 0.9 % 250 mL infusion  0.2-1.4 mcg/kg/hr IntraVENous Continuous Surya Benites MD   Stopped at 12/12/21 0204    propofol injection  5-50 mcg/kg/min IntraVENous Titrated Surya Benites MD 19.8 mL/hr at 12/16/21 0247 35 mcg/kg/min at 12/16/21 0247    fentaNYL (SUBLIMAZE) 1,000 mcg in sodium chloride 0.9 % 100 mL infusion  12.5-200 mcg/hr IntraVENous Continuous Surya Benites MD 15 mL/hr at 12/16/21 0956 150 mcg/hr at 12/16/21 0956    chlorhexidine (PERIDEX) 0.12 % solution 15 mL  15 mL Mouth/Throat BID Cristina Ann MD   15 mL at 12/16/21 0948    fentaNYL (SUBLIMAZE) injection 50 mcg  50 mcg IntraVENous Q1H PRN Cristina Ann MD        norepinephrine (LEVOPHED) 16 mg in dextrose 5% 250 mL infusion  2-100 mcg/min IntraVENous Continuous Josiephine MD Kamari 7.5 mL/hr at 12/16/21 0522 8 mcg/min at 12/16/21 0522    cisatracurium besylate (NIMBEX) 200 mg in sodium chloride 0.9 % 100 mL infusion  0.5-10 mcg/kg/min IntraVENous Continuous Vandanasiephine MD Kamari 4.2 mL/hr at 12/15/21 1802 1.5 mcg/kg/min at 12/15/21 1802    [Held by provider] baricitinib (OLUMIANT) tablet 4 mg  4 mg Oral Daily Christine Brunner MD   4 mg at 12/12/21 0055    sodium chloride flush 0.9 % injection 5-40 mL  5-40 mL IntraVENous 2 times per day Irena Montalvo MD   10 mL at 12/16/21 0948    sodium chloride flush 0.9 % injection 5-40 mL  5-40 mL IntraVENous PRN Irena Montalvo MD        0.9 % sodium chloride infusion  25 mL IntraVENous PRN Irena Montalvo MD        ondansetron (ZOFRAN-ODT) disintegrating tablet 4 mg  4 mg Oral Q8H PRN Irena Montalvo MD        Or    ondansetron [facility]) injection 4 mg  4 mg IntraVENous Q6H PRN Irena Montalvo MD        acetaminophen (TYLENOL) tablet 650 mg  650 mg Oral Q6H PRN Irena Montalvo MD        Or    acetaminophen (TYLENOL) suppository 650 mg  650 mg Rectal Q6H PRN Irena Montalvo MD        dexamethasone (DECADRON) injection 6 mg  6 mg IntraVENous Q24H Irena Montalvo MD   6 mg at 12/16/21 0041       History:       PMHx:  Past Medical History:   Diagnosis Date    Bilateral carpal tunnel syndrome 11/15/2019    Chronic bilateral low back pain without sciatica 11/15/2019    Headache(784.0)     History of mammography, screening 1990's    History of shingles 2009    Obesity, Class III, BMI 40-49.9 (morbid obesity) [facility] 3/27/2018    Papanicolaou smear for cervical cancer screening     NEVER    Primary osteoarthritis of both knees 5/25/2016       PSHx:  Past Surgical History:   Procedure Laterality Date    APPENDECTOMY      BREAST SURGERY  1990s       Social Hx:  Social History     Socioeconomic History    Marital status: Single     Spouse name: None    Number of children: None    Years of education: None    Highest education level: None   Occupational History    None   Tobacco Use    Smoking status: Never Smoker    Smokeless tobacco: Never Used   Substance and Sexual Activity    Alcohol use: No    Drug use: No    Sexual activity: Never   Other Topics Concern    None   Social History Narrative    None     Social Determinants of Health     Financial Resource Strain:     Difficulty of Paying Living Expenses: Not on file   Food Insecurity:     Worried About Running Out of Food in the Last Year: Not on file    Ian of Food in the Last Year: Not on file   Transportation Needs:     Lack of Transportation (Medical): Not on file    Lack of Transportation (Non-Medical):  Not on file   Physical Activity:     Days of Exercise per Week: Not on file    Minutes of Exercise per Session: Not on file   Stress:     Feeling of Stress : Not on file   Social Connections:     Frequency of Communication with Friends and Family: Not on file    Frequency of Social Gatherings with Friends and Family: Not on file    Attends L[employer] day Services: Not on file    Active Member of [address] [place] [address] [employer] or Organizations: Not on file    Attends Club or Organization Meetings: Not on file    Marital Status: Not on file   Intimate Partner Violence:     Fear of Current or Ex-Partner: Not on file    Emotionally Abused: Not on file    Physically Abused: Not on file    Sexually Abused: Not on file   Housing Stability:     Unable to Pay for Housing in the Last Year: Not on file    Number of [place] in the Last Year: Not on file    Unstable Housing in the Last Year: Not on file       Family Hx:  Family History   Problem Relation Age of Onset    High Blood Pressure Mother        LABS: Reviewed     CBC:  Lab Results   Component Value Date    WBC 19.8 12/16/2021    RBC 4.23 12/16/2021    HGB 11.7 12/16/2021    HCT 38.0 12/16/2021    MCV 89.8 12/16/2021    MCH 27.5 12/16/2021    MCHC 30.7 12/16/2021    RDW 14.6 12/16/2021     12/16/2021     CBC with Differential:   Lab Results   Component Value Date    WBC 19.8 12/16/2021    RBC 4.23 12/16/2021    HGB 11.7 12/16/2021    HCT 38.0 12/16/2021     12/16/2021    MCV 89.8 12/16/2021    MCH 27.5 12/16/2021    MCHC 30.7 12/16/2021    RDW 14.6 12/16/2021    BANDSPCT 3 12/15/2021    METASPCT 2 12/15/2021    LYMPHOPCT 2.0 12/16/2021    PROMYELOPCT 1 12/14/2021    MONOPCT 2.7 12/16/2021    MYELOPCT 1 12/14/2021    BASOPCT 0.1 12/16/2021    MONOSABS 0.5 12/16/2021    LYMPHSABS 0.4 12/16/2021    EOSABS 0.0 12/16/2021    BASOSABS 0.0 12/16/2021     CMP:    Lab Results   Component Value Date     12/16/2021    K 4.9 12/16/2021     12/16/2021    CO2 25 12/16/2021    BUN 53 12/16/2021    CREATININE 1.96 12/16/2021    CREATININE 2.2 12/16/2021    GFRAA 30.3 12/16/2021    LABGLOM 25.1 12/16/2021    GLUCOSE 197 12/16/2021    PROT 6.0 12/16/2021    LABALBU 2.4 12/16/2021    CALCIUM 8.8 12/16/2021    BILITOT 0.5 12/16/2021    ALKPHOS 130 12/16/2021    AST 23 12/16/2021    ALT 59 12/16/2021     BMP:    Lab Results   Component Value Date     12/16/2021    K 4.9 12/16/2021     12/16/2021    CO2 25 12/16/2021    BUN 53 12/16/2021    LABALBU 2.4 12/16/2021    CREATININE 1.96 12/16/2021    CREATININE 2.2 12/16/2021    CALCIUM 8.8 12/16/2021    GFRAA 30.3 12/16/2021    LABGLOM 25.1 12/16/2021    GLUCOSE 197 12/16/2021     TSH: No results found for: TSH  Vitamin B12 and Folate: No components found for: FOLIC,  D75  Urinalysis:   Lab Results   Component Value Date    NITRU Negative 12/15/2021    WBCUA 20-50 12/15/2021    BACTERIA Negative 12/15/2021    RBCUA  12/15/2021    BLOODU MODERATE 12/15/2021    SPECGRAV 1.019 12/15/2021    GLUCOSEU Negative 12/15/2021           FUNCTIONAL ADL´S:     Independent: [  ] Eating, [   ] Dressing, [   ] Transferring, [   ] Freddy Sark, [   ] Bathing, [   ] Continence  Dependent   : [ x ] Eating, [ x ] Dressing, [ x ] Transferring, [ x ] Toileting, [ x ] Bathing, [ x ] Continence  W. assistant : [  ] Eating, [   ] Dressing, [   ] Transferring, [   ] Freddy Dickinson, [   ] Estevan Camacho, [   ] Continence    Radiology: Reviewed      XR CHEST PORTABLE    Result Date: 12/12/2021  Exam: XR CHEST PORTABLE History:  post line placement Technique: AP portable view of the chest obtained. Comparison: none Chest x-ray portable Findings: The patient is intubated, there is an NG tube coursing towards the stomach    There is a  right internal jugular central line with the tip projecting in the region of the superior vena cava. The cardiomediastinal silhouette is within normal limits. There is no pneumothorax There are bilateral patchy alveolar opacities. There are no pleural effusions. Bones of the thorax appear intact. Status post central line placement. There are bilateral alveolar opacities , infiltrates worrisome for viral COVID-19 pneumonia. XR CHEST PORTABLE    Result Date: 12/12/2021  Exam: XR CHEST PORTABLE History:  f/u intubated yesterday, covid Technique: AP portable view of the chest obtained. Comparison: none Chest x-ray portable Findings: The patient is intubated, there is an NG tube coursing towards the stomach  The cardiomediastinal silhouette is within normal limits. There are bilateral patchy alveolar opacities. There are no pleural effusions. Bones of the thorax appear intact. There are bilateral alveolar opacities , infiltrates worrisome for viral COVID-19 pneumonia. XR CHEST PORTABLE    Result Date: 12/11/2021  Exam: XR CHEST PORTABLE History:  intubation Technique: AP portable view of the chest obtained. Comparison: none Chest x-ray portable Findings: The patient is intubated with the tip of the endotracheal tube 4 cm above the lolly. The cardiac silhouette is at the upper limits of normal in size. There is no pneumothorax. There are bilateral patchy alveolar opacities. Bones of the thorax appear intact. Status post intubation. There are bilateral alveolar opacities , infiltrates worrisome for viral COVID-19 pneumonia. Assessment and plan:    12/16/21:  Spoke with sister, Nj Gonzalez, to give update.  Also sent her a completed form for her travel insurance as she had to cancel cruise due to the complexity of patient's medical condition and needing to make decisions for her. No other questions or concerns at this time. 12/15/21:  Discussed care and provided update to patient's sister, Xuan Johnson. All questions answered. Will write letter for Eden's travel insurance, as she was suppose to go on a cruise but had to cancel due to her sister's condition and needing to make healthcare decisions for her. 1. Palliative care encounter  Explained the role of palliative care in treating patient. Discussed symptom management related to chronic disease/condition. Provided emotional support and active listening. Patient's sister understands and is agreeable to current plan. Spoke with intensivist, Dr. Omar Peterson, for update on patient's condition. Provided update to sister, Xuan Johnosn. -Advance Care Planning  Discussed goals of care with patient's sister Xuan Johnson. Patient shall remain a FULL CODE at this time. HPOA in place: Nay Altman (sister). However, she is ill with COVID as well and is waiting for an ICU bed. Hakeem Jackson currently at [facility]. Attempted to call her cell phone. If and when Hakeem Jackson able to communicate, will see if she is okay with sister Xuan Johnson making decisions since she is currently critically ill also. Xuan Johnson reports another brother and sister in [place]. Spoke with patient's nurse, Maricel Zambrano. Patient's sister, Hakeem Jackson, is coming to ICU, and Maricel Zambrano will attempt to get consent from Hakeem Jackson to let Xuan Johnson take over decision-making for patient.    -Goals of Care Discussion:  Disease process and goals of treatment were discussed in basic terms. Marilyn TUCKER's goal is to optimize available comfort care measures and continue with full treatment of COVID-19 infection. We discussed the palliative care philosophy in light of those goals. We discussed all care options contingent on treatment response and QOL. Much active listening, presence, and emotional support were given.      Patient is being treated for multiple medical conditions this admission includin. Acute respiratory failure with hypoxia secondary to COVID-19 pneumonia  2. Hyperkalemia  3. Steroid induced hyperglycemia  4. Hypercoagulable state    Total Time: 25 minutes with >50% spent counseling/care coordination.      Electronically signed by TEJINDER Juárez CNP on 2021 at 1:30 PM

## 2021-12-17 NOTE — PROGRESS NOTES
Critical Care Progress Note    12/17/2021 11:33 AM    Subjective:   Admit Date: 12/9/2021  PCP: No primary care provider on file. No chief complaint on file. Interval History: Intubated 2 days ago, has been on high vent settings. currently, FiO2 is 70% and PEEP is 14. Sedated with propofol, precedex and paralyzed with Nimbex. UO is 1200 cc.      12/14/21:  Continues to be intubated and sedated. Has been proned. ABG this AM improved. Down to 60% and PEEP of 12. UP is low but kidney function has been stable. 12/15/21: She is currently on 50% and 10 of PEEP. Blood gases this morning are marginal.  Urine output is good. BUN is slightly worse which could be due to steroids. 12/16/21: continues to be on on vent with high settings. Is currently prone. Kidney function stabilized. Urine output still low. 12/17/21: Oxygenation status has been better. Currently on 70% FiO2 and PEEP of 12. Urine output has been decreasing. Kidney function has been getting worse.       Medications:   Scheduled Meds:   famotidine (PEPCID) injection  10 mg IntraVENous Daily    enoxaparin  40 mg SubCUTAneous Daily    meropenem  1,000 mg IntraVENous Q12H    insulin glargine  10 Units SubCUTAneous Nightly    lactulose  20 g Oral TID    polyethylene glycol  17 g Oral Daily    senna  5 mL Oral BID    insulin lispro  0-12 Units SubCUTAneous Q4H    chlorhexidine  15 mL Mouth/Throat BID    [Held by provider] baricitinib  4 mg Oral Daily    sodium chloride flush  5-40 mL IntraVENous 2 times per day    dexamethasone  6 mg IntraVENous Q24H     Continuous Infusions:   sodium chloride 50 mL/hr at 12/16/21 1659    dextrose      dexmedetomidine (PRECEDEX) IV infusion Stopped (12/12/21 0204)    propofol 40 mcg/kg/min (12/17/21 0844)    fentaNYL (SUBLIMAZE) infusion 150 mcg/hr (12/17/21 0540)    norepinephrine Stopped (12/17/21 0841)    cisatracurium (NIMBEX) infusion 2.5 mcg/kg/min (12/17/21 0844)    sodium chloride Recent Labs     12/16/21  1035 12/16/21  1651 12/17/21  0600 12/17/21  0754   WBC 19.8*  --  17.9*  --    HGB 11.7*   < > 10.7* 12.3   *  --  472*  --     < > = values in this interval not displayed. ABG: No results for input(s): PH, PCO2, PO2 in the last 72 hours. Assessment and Plan:     Impression:     - Acute hypoxic RF due to severe COVID-19 pneumonia. Some improvement in lung mechanics  - Severe covid-19 pneumonia with refractory hypoxia requiring mechanical ventilation  - ARDS, due to # 2  - Sepsis due to above with shock, currently on Levophed. - Hyperkalemia. - Anemia, due to acute illness   - Protein caloric malnutrition  -  SHLOMO, worsening with lower urine OP             Recommendations:       - Continue current care in ICU for hemodynamic and airway monitoring.   - Continue ventilator bundle. Maintain  lung protective strategy. Continue proning. She will be supine later today at 6 P.  - Continue deep sedation and paralysis. - continue steroids  - Watch for ICU delirium  - Strict I/O. Watch kidney function daily. Consult nephrology, might need RRT in 1-2 days.   - DVT and GI  Prophylaxis   -  maintain on tube feeding       Prophylaxis:  Stress ulcer: [] PPI Agent [] H2RA [] Sucralfate [] Other:   VTE: [] Enoxaparin  [] SC Heparin  [] SCD      Full Code      Excluding procedures, the total critical care time caring for this patient with life threatening, unstable organ failure, including direct patient contact, review of medical record, management of life support systems, review of data including imaging and labs, discussions with other team members, patient's family and physicians at least 31 minutes so far today.         Electronically signed by Vale Schilder, MD on 12/17/2021 at 11:33 AM

## 2021-12-17 NOTE — CONSULTS
[facility]. Nephrology  Consult Note           Reason for Consult:  Amita Perrin  Requesting Physician:  Dr. Wesley Greer    Chief Complaint:  Weakness, sob  History Obtained From:  patient, electronic medical record    History of Present Ilness:    66y.o. year old female admitted with weakness and sob and fatigue. Admitted on 12/9. Hadn't felt well since end of November. Unvaccinated. Cough, intermittent chills. Tested positive for covid. Treated with steroids and remdesivir. Added baricitinib. Baseline cr normal.  Not on pressors now. Intubated on 12/12. Currently on 70% FiO2. Prone. Worsening GFR. CT with contrast on 12/10. Had k 5.4 this am.  Got lokelma. Past Medical History:        Diagnosis Date    Acute kidney injury ([address]     Bilateral carpal tunnel syndrome 11/15/2019    Chronic bilateral low back pain without sciatica 11/15/2019    Headache(784.0)     History of mammography, screening 1990's    History of shingles 2009    Obesity, Class III, BMI 40-49.9 (morbid obesity) ([address] 3/27/2018    Papanicolaou smear for cervical cancer screening     NEVER    Primary osteoarthritis of both knees 5/25/2016       Past Surgical History:        Procedure Laterality Date    APPENDECTOMY      BREAST SURGERY  1990s       Home Medications:    No current facility-administered medications on file prior to encounter. No current outpatient medications on file prior to encounter. Allergies:  Patient has no known allergies.     Social History:    Social History     Socioeconomic History    Marital status: Single     Spouse name: Not on file    Number of children: Not on file    Years of education: Not on file    Highest education level: Not on file   Occupational History    Not on file   Tobacco Use    Smoking status: Never Smoker    Smokeless tobacco: Never Used   Substance and Sexual Activity    Alcohol use: No    Drug use: No    Sexual activity: Never   Other Topics Concern    Not on file   Social History Narrative    Not on file     Social Determinants of Health     Financial Resource Strain:     Difficulty of Paying Living Expenses: Not on file   Food Insecurity:     Worried About Running Out of Food in the Last Year: Not on file    Ian of Food in the Last Year: Not on file   Transportation Needs:     Lack of Transportation (Medical): Not on file    Lack of Transportation (Non-Medical): Not on file   Physical Activity:     Days of Exercise per Week: Not on file    Minutes of Exercise per Session: Not on file   Stress:     Feeling of Stress : Not on file   Social Connections:     Frequency of Communication with Friends and Family: Not on file    Frequency of Social Gatherings with Friends and Family: Not on file    Attends Taoist Services: Not on file    Active Member of [employer] or Organizations: Not on file    Attends Club or Organization Meetings: Not on file    Marital Status: Not on file   Intimate Partner Violence:     Fear of Current or Ex-Partner: Not on file    Emotionally Abused: Not on file    Physically Abused: Not on file    Sexually Abused: Not on file   Housing Stability:     Unable to Pay for Housing in the Last Year: Not on file    Number of [place] in the Last Year: Not on file    Unstable Housing in the Last Year: Not on file       Family History:   Family History   Problem Relation Age of Onset    High Blood Pressure Mother        Review of Systems:   Review of Systems   Unable to perform ROS: Intubated         Physical exam:   Constitutional:  VITALS:  BP (!) 103/47   Pulse 76   Temp 97.7 °F (36.5 °C) (Rectal)   Resp (!) 34   Ht 5' 1\" (1.549 m)   Wt 210 lb 15.7 oz (95.7 kg)   SpO2 100%   BMI 39.86 kg/m²   Gen: prone, 70% FiO2, sedated  Skin: no rash, turgor wnl  Heent:  eomi, mmm  Neck: no bruits or jvd noted, thyroid normal  Lungs:  Normal expansion. Clear to auscultation. No rales, rhonchi, or wheezing.   Heart:  Heart sounds are normal.  Regular rate and rhythm without murmur, gallop or rub. Abdomen:  +bs, soft, nt, nd, no hepatosplenomegaly   Extremities: trace edema  Psychiatric: unable to assess  Musculoskeletal:  ; digits, nails normal    Data/  CBC:   Lab Results   Component Value Date    WBC 17.9 12/17/2021    RBC 3.76 12/17/2021    HGB 12.3 12/17/2021    HCT 33.4 12/17/2021    MCV 88.7 12/17/2021    MCH 28.3 12/17/2021    MCHC 32.0 12/17/2021    RDW 14.5 12/17/2021     12/17/2021     BMP:    Lab Results   Component Value Date     12/17/2021    K 5.4 12/17/2021     12/17/2021    CO2 23 12/17/2021    BUN 68 12/17/2021    LABALBU 2.0 12/17/2021    CREATININE 2.8 12/17/2021    CREATININE 2.51 12/17/2021    CALCIUM 8.6 12/17/2021    GFRAA 20 12/17/2021    LABGLOM 17 12/17/2021    GLUCOSE 223 12/17/2021     CTA CHEST W WO CONTRAST    Result Date: 12/10/2021  EXAMINATION:  CHEST CTA WITH CONTRAST (PULMONARY EMBOLISM PROTOCOL) CLINICAL HISTORY:  Hypoxia. Shortness of breath. Covid positive. Technique:  Spiral axial CT acquisition of the chest from the thoracic inlet to the upper abdomen following IV contrast.  2-D images were reconstructed in the sagittal and coronal planes. 3-D MIPS images were generated in the coronal and axial planes. Images were reviewed on the PACS workstation. All images including the 3-D MIPS were personally archived. Contrast:  IV administration of 100 ml Isovue 300 All CT scans at this facility use dose modulation, iterative reconstruction, and/or weight based dosing when appropriate to reduce radiation dose to as low as reasonably achievable. Comparison:  None. RESULT: Limitations:  None. Lines, tubes, and devices:  None. Evaluation for thromboembolic disease:  No evidence for thromboembolic disease involving the right heart chambers, main pulmonary arteries, lobar pulmonary arteries, segmental pulmonary arteries, or visualized subsegmental pulmonary arteries.  Lung parenchyma and pleura: Central airways grossly patent. Ground glass opacities throughout the lungs involving all lobes. More consolidative type opacity at the lung bases. No pleural effusion or pneumothorax. Thoracic inlet, heart, and mediastinum:  Visualized thyroid unremarkable. Scattered subcentimeter mediastinal and thoracic lymph nodes, probably reactive. Normal thoracic aorta. Normal pulmonary artery size. Normal heart size. No coronary artery calcifications. No pericardial effusion or thickening. Small hiatal hernia. Bones:  No acute osseous findings. No destructive osseous lesions. Multiple degenerative changes with bulky bridging endplate osteophytes. Soft tissues: Unremarkable. Upper abdomen:  No acute abnormality in the imaged upper abdomen. Cholelithiasis. Partially imaged left small renal cyst.     No CT evidence for pulmonary embolus. Ground glass opacities, associated with COVID 19 pneumonia among multiple other etiologies. XR CHEST PORTABLE    Result Date: 12/12/2021  Exam: XR CHEST PORTABLE History:  post line placement Technique: AP portable view of the chest obtained. Comparison: none Chest x-ray portable Findings: The patient is intubated, there is an NG tube coursing towards the stomach    There is a  right internal jugular central line with the tip projecting in the region of the superior vena cava. The cardiomediastinal silhouette is within normal limits. There is no pneumothorax There are bilateral patchy alveolar opacities. There are no pleural effusions. Bones of the thorax appear intact. Status post central line placement. There are bilateral alveolar opacities , infiltrates worrisome for viral COVID-19 pneumonia. XR CHEST PORTABLE    Result Date: 12/12/2021  Exam: XR CHEST PORTABLE History:  f/u intubated yesterday, covid Technique: AP portable view of the chest obtained.  Comparison: none Chest x-ray portable Findings: The patient is intubated, there is an NG tube coursing towards the stomach  The cardiomediastinal silhouette is within normal limits. There are bilateral patchy alveolar opacities. There are no pleural effusions. Bones of the thorax appear intact. There are bilateral alveolar opacities , infiltrates worrisome for viral COVID-19 pneumonia. XR CHEST PORTABLE    Result Date: 12/11/2021  Exam: XR CHEST PORTABLE History:  intubation Technique: AP portable view of the chest obtained. Comparison: none Chest x-ray portable Findings: The patient is intubated with the tip of the endotracheal tube 4 cm above the lolly. The cardiac silhouette is at the upper limits of normal in size. There is no pneumothorax. There are bilateral patchy alveolar opacities. Bones of the thorax appear intact. Status post intubation. There are bilateral alveolar opacities , infiltrates worrisome for viral COVID-19 pneumonia. XR CHEST PORTABLE    Result Date: 12/9/2021  COMPARISON: No prior studies available for comparison. HISTORY: SOB, COVID TECHNIQUE: AP view FINDINGS: Bilateral groundglass opacities are seen in the right upper, mid and lower lobes, left mid and left lower lobes. The right hemidiaphragm is elevated. The cardiac silhouette is nonenlarged. The bony structures are grossly intact. Bilateral infiltrates in the distribution could be due to [facility]    Result Date: 12/14/2021  US ABDOMEN LIMITED : 12/13/2021 CLINICAL HISTORY:  Hyperbilirubinemia . COMPARISON: Chest CTA 12/10/2021. TECHNIQUE: Transabdominal ultrasound of the right upper quadrant was performed. FINDINGS: The study is limited by the patient's medical condition and body habitus. An approximately 2.5 cm gallstone is noted within a mildly distended gallbladder. There is no significant gallbladder wall thickening, pericholecystic fluid, biliary dilatation, ascites, or other findings of concern identified. The common duct measures approximately 4 to 5 mm at the sebastián hepatis.  The visualized liver, pancreas, right kidney, and great vessels are unremarkable. CHOLELITHIASIS WITHIN A MILDLY DISTENDED GALLBLADDER. NO BILIARY DILATATION OR OTHER FINDINGS OF ACUTE CHOLECYSTITIS, WITHIN THE LIMITS OF THE STUDY. Assessment/  65 yo lady with severe covid 19 PNA. symptomts started at very end of November. Admitted on 12/9. Intubated on 12/12. Worsening kidney function now. Not on pressors. Would consider her SHLOMO to be ATN. Has hyperkalemia    Plan/  1- repeat labs later today  2- I called sister Mony Pace to update her  3- no indication for RRT at current time  4. Cont low rate ivf for now but likely stop in next day or 2    Thank you for the consultation. Please do not hesitate to call with questions.     Stefani Triplett MD

## 2021-12-17 NOTE — PROGRESS NOTES
Patient ID:    Helen Jones  [id number]  77 y.o.  1950     Assumed Care of Patient. Report Received from RN. Assessment Complete, please see flow sheets. Labs, orders, plan of care and meds reviewed. IV fluids and ventilator settings reviewed. Patient sinus rhythm on the monitor. Patient remains on Nimbex. Train of four is 2 twitches on 5 amps. OG verified for placement. 70 cc residual noted on pullback. 0000 patient placed in prone position in reverse trendelenburg.    .      Electronically signed by Allie Chapman RN

## 2021-12-17 NOTE — PROGRESS NOTES
Infectious Diseases Inpatient Progress Note          HISTORY OF PRESENT ILLNESS:    Patient is requiring to be paralyzed in order to be proned. Remains afebrile. On assist control 70%. Off pressors, currently proned  Decreased drainage from the nose  Worsening acute kidney injury  Has persistent leukocytosis  Has acute kidney injury  follow up critical COVID-19 pneumonia with severe hypoxia requiring intubation off IV remdesivir, oral baricitinib discontinued because of elevated liver function test  Currently on Decadron and Lovenox subcu, well tolerated. Patient is currently intubated and sedated   Currently on IV meropenem for Pseudomonas UTI and staph aureus pneumonia      Current Medications:     heparin (porcine)  5,000 Units SubCUTAneous 3 times per day    famotidine (PEPCID) injection  10 mg IntraVENous Daily    meropenem  1,000 mg IntraVENous Q12H    insulin glargine  10 Units SubCUTAneous Nightly    lactulose  20 g Oral TID    polyethylene glycol  17 g Oral Daily    senna  5 mL Oral BID    insulin lispro  0-12 Units SubCUTAneous Q4H    chlorhexidine  15 mL Mouth/Throat BID    [Held by provider] baricitinib  4 mg Oral Daily    sodium chloride flush  5-40 mL IntraVENous 2 times per day    dexamethasone  6 mg IntraVENous Q24H       Allergies:  Patient has no known allergies. Review of Systems  unable to provide ROS because of sedation      Physical Exam  Vitals:    12/17/21 1415 12/17/21 1430 12/17/21 1445 12/17/21 1500   BP: (!) 106/45 (!) 108/45 (!) 100/46 (!) 102/51   Pulse: 74 76 76 75   Resp: (!) 34 (!) 34 (!) 34 (!) 34   Temp:       TempSrc:       SpO2: 97% 96% 96% 95%   Weight:       Height:         General Appearance: Intubated and sedated, on assist control 90%. Currently proned  Nonresponsive to verbal stimulation  Skin: warm and dry, no rash.    Head: normocephalic and atraumatic  Eyes: anicteric sclerae  ENT: Intact ET and OG  Lungs: normal respiratory effort, bilateral scattered rhonchi  Heart normal S1-S2 no murmur  Abdomen: soft, no distention or rigidity  Bilateral leg swelling   no erythema  Cloudy urine with small amount of sediment      DATA:    Lab Results   Component Value Date    WBC 17.9 (H) 12/17/2021    HGB 12.3 12/17/2021    HCT 33.4 (L) 12/17/2021    MCV 88.7 12/17/2021     (H) 12/17/2021     Lab Results   Component Value Date    CREATININE 2.8 (H) 12/17/2021    BUN 68 (H) 12/17/2021     12/17/2021    K 5.4 (H) 12/17/2021     12/17/2021    CO2 23 12/17/2021       Hepatic Function Panel:  Lab Results   Component Value Date    ALKPHOS 110 12/17/2021    ALT 42 12/17/2021    AST 15 12/17/2021    PROT 5.7 12/17/2021    BILITOT 0.3 12/17/2021    LABALBU 2.0 12/17/2021     Microscopic Urinalysis [[id number]] (Abnormal) Collected: 12/15/21 1817 Updated: 12/15/21 1856 RBC, [id number] Abnormal /HPF Bacteria, UANegative/HPF Hyaline Casts, UA10-20/HPF WBC, UA20-50 Abnormal /HPF Epithelial Cells, UA0-2/HPF Urinalysis Reflex to Culture [[id number]] (Abnormal) Collected: 12/15/21 1817 Updated: 12/15/21 1856 Specimen Source: Urine, clean catch Color, UAYellow Clarity, UACLOUDY Abnormal  Glucose, UrNegativemg/dL Bilirubin UrineNegative Ketones, UrineNegativemg/dL Specific Gravity, UA1.019 Blood, UrineMODERATE Abnormal  pH, UA5.0 Protein, UA30 Abnormal mg/dL Urobilinogen, Urine1.0E.U./dL Nitrite, UrineNegative Leukocyte Esterase, UrineSMALL Abnormal  Urine Reflex to CultureYes   Procalcitonin [[id number]] (Abnormal) Collected: 12/16/21 1035 Updated: 12/16/21 1315 Procalcitonin5.53 High ng/mL   Comment: Suspected Sepsis:   Low likelihood of sepsis  <.50 ng/mL      ProcedureComponentValueUnitsDate/Time Culture, Respiratory [[id number]: 12/15/21 1813 Order Status: SentSpecimen: Lung from EndotrachealUpdated: 12/16/21 1354 Gram Stain ResultModerate WBC's   No epithelial cells   Many Gram positive cocci in clusters-resembling Staph   Narrative:    Qbvwittm=179 on December 13   Result Notes     Ref Range & Units 12/16/21 0702   Ferritin 13 - 150 ug/L 1,655 High     Comment: [employer]           IMPRESSION:    · Critical COVID-19 pneumonia with superimposed bacterial pneumonia with MSSA  · Pseudomonas aeruginosa UTI  · Acute respiratory failure with severe hypoxia requiring intubation  · Hypercoagulable state  · Acute sinusitis  · Hyperbilirubinemia and elevated liver function tests with severe lymphopenia, improving  · Acute kidney injury    Patient Active Problem List   Diagnosis    Pneumonia due to COVID-19 virus    Acute respiratory failure with hypoxia (HCC)    Hypercoagulable state ([address]    Anemia    Anatomical narrow angle, bilateral    Bilateral carpal tunnel syndrome    Bilateral hearing loss    Chronic bilateral low back pain without sciatica    Floaters    Hyperopia    Lamellar macular hole of right eye    Meralgia paraesthetica, left    Murmur    Obesity, Class III, BMI 40-49.9 (morbid obesity) ([address]    Presbyopia of both eyes    Primary osteoarthritis of both knees    Pseudophakia of both eyes    Regular astigmatism    Tinnitus of both ears    Vitreomacular traction syndrome of right eye    Elevated liver function tests    Advance care planning    Goals of care, counseling/discussion    Palliative care encounter    Acute bacterial sinusitis    Acute kidney injury ([address]       PLAN:  · Check blood urine sputum culture  · IV Meropenem, decrease dose to 500 every 12 because of renal function  · Consult nephrology  · follow-up CBC complete metabolic   · Decadron and anticoagulation as ordered  · Vent support and weaning as tolerated  · Continue droplet plus isolation for COVID-19  ·         Elba Palomino MD

## 2021-12-17 NOTE — CONSULTS
Palliative Care Progress Note  Patient: Anamaria Kramer  Gender: female  YOB: 1950  Unit/Bed: IC10/IC10-01  CodeStatus: Full Code  Inpatient Treatment Team: Treatment Team: Attending Provider: Tapan Mercedes DO; Consulting Physician: Bella Rodarte MD; Utilization Reviewer: Tiffanie Hodge, RN; Consulting Physician: Shira Morocho MD; Utilization Reviewer: Tripp Mcdonald, RN; Registered Nurse: Shellie Esquivel, RN; Registered Nurse: Moises Myrick RN; : [address] [place] [address] Date:  12/9/2021    Chief Complaint: Shortness of breath    History of Presenting Illness:      Anamaria Kramer is a 66 y.o. female on hospital day 8 with a history of anemia, carpal tunnel, heart murmur, OA, and hearing loss. Patient presented to the ER from home with shortness of breath. She started feeling sick 10 days ago with headache, body aches, and fatigue. Patient also reported poor appetite, intermittent chills, fever, and cough. Currently not vaccinated. Patient was admitted to a regular medical floor with COVID-19 pneumonia. She later was transferred to the ICU and intubated. Patient remains sedated and intubated with FiO2 of 70% PEEP of 14. Patient is currently unresponsive. HPI limited due to unresponsive. Spoke with sister, Rola Sabillon. Rola Sabillon reports that patient's HPOA is her sister Mony Pace, however, Mony Pace is currently awaiting an ICU bed as well at [facility].      Most recent notable labs: ABGs: pH 7.303, PCO2 50, PO2 59, O2 Sat 87, Creat. 1.99, GFR 24.6, albumin 2.6, TB 3.1, Alk Phos. 147, , .    12/15/21:  Patient was proned and back to supine. Vent support was decreased until placed supine again. She remains on sedation and unresponsive. When proned, FiO2 was at 50% and PEEP at 10. Most recent notable labs: pO2, arterial 69, O2 Sat, Arterial 91, creat.  1.19, GFR 44.6, BUN 45, albumin 2.6, AST 64, WBC 19.3.    12/16/21:  Patient currently proned, intubated, and sedated. She is unresponsive. FiO2 at 90%. Most recent notable labs: WBC 19.8, 5.53, creat. 1.96, GFR 25.1, BUN 53, Albumin 2.4, ALT 59, pH 7.172, pCO2 76, pO2 131.    12/17/21      Remains proned and intubated, sedated, on pressors, unresponsive. Oxygenation status has been better. Currently on 70% FiO2 and PEEP of 12. Urine output has been decreasing. Kidney function has been getting worse. Review of Systems:       Review of Systems   Unable to perform ROS: Intubated   All other systems reviewed and are negative. Physical Examination:       BP (!) 189/66   Pulse 72   Temp 97.2 °F (36.2 °C)   Resp (!) 34   Ht 5' 1\" (1.549 m)   Wt 210 lb 15.7 oz (95.7 kg)   SpO2 98%   BMI 39.86 kg/m²    Physical Exam  Constitutional:       Appearance: She is ill-appearing. Interventions: She is sedated and intubated. HENT:      Head: Normocephalic and atraumatic. Nose: No rhinorrhea. Eyes:      General:         Right eye: No discharge. Comments: Unable to assess due to prone position. Neck:      Comments: Unable to assess due to prone position. Cardiovascular:      Rate and Rhythm: Normal rate and regular rhythm. Pulmonary:      Effort: She is intubated. Breath sounds: Decreased breath sounds present. Abdominal:      Comments: Unable to assess due to prone position. Genitourinary:     Comments: Serna with clear yellow urine. Musculoskeletal:      Right lower leg: No edema. Left lower leg: No edema. Skin:     General: Skin is warm and dry. Neurological:      Mental Status: She is unresponsive. Comments: Sedated. Psychiatric:         Speech: She is noncommunicative. Cognition and Memory: Cognition is impaired.          Allergies:      No Known Allergies    Medications:      Current Facility-Administered Medications   Medication Dose Route Frequency Provider Last Rate Last Admin    famotidine (PEPCID) injection 10 mg  10 mg IntraVENous Daily Tori Abdi Social Hx:  Social History     Socioeconomic History    Marital status: Single     Spouse name: None    Number of children: None    Years of education: None    Highest education level: None   Occupational History    None   Tobacco Use    Smoking status: Never Smoker    Smokeless tobacco: Never Used   Substance and Sexual Activity    Alcohol use: No    Drug use: No    Sexual activity: Never   Other Topics Concern    None   Social History Narrative    None     Social Determinants of Health     Financial Resource Strain:     Difficulty of Paying Living Expenses: Not on file   Food Insecurity:     Worried About [employer] in the Last Year: Not on file    Ian of Food in the Last Year: Not on file   Transportation Needs:     Lack of Transportation (Medical): Not on file    Lack of Transportation (Non-Medical):  Not on file   Physical Activity:     Days of Exercise per Week: Not on file    Minutes of Exercise per Session: Not on file   Stress:     Feeling of Stress : Not on file   Social Connections:     Frequency of Communication with Friends and Family: Not on file    Frequency of Social Gatherings with Friends and Family: Not on file    Attends Oriental orthodox Services: Not on file    Active Member of Clubs or Organizations: Not on file    Attends Club or Organization Meetings: Not on file    Marital Status: Not on file   Intimate Partner Violence:     Fear of Current or Ex-Partner: Not on file    Emotionally Abused: Not on file    Physically Abused: Not on file    Sexually Abused: Not on file   Housing Stability:     Unable to Pay for Housing in the Last Year: Not on file    Number of [place] in the Last Year: Not on file    Unstable Housing in the Last Year: Not on file       Family Hx:  Family History   Problem Relation Age of Onset    High Blood Pressure Mother        LABS: Reviewed     CBC:  Lab Results   Component Value Date    WBC 17.9 12/17/2021    RBC 3.76 12/17/2021    HGB 12.3 12/17/2021 HCT 33.4 12/17/2021    MCV 88.7 12/17/2021    MCH 28.3 12/17/2021    MCHC 32.0 12/17/2021    RDW 14.5 12/17/2021     12/17/2021     CBC with Differential:   Lab Results   Component Value Date    WBC 17.9 12/17/2021    RBC 3.76 12/17/2021    HGB 12.3 12/17/2021    HCT 33.4 12/17/2021     12/17/2021    MCV 88.7 12/17/2021    MCH 28.3 12/17/2021    MCHC 32.0 12/17/2021    RDW 14.5 12/17/2021    BANDSPCT 3 12/15/2021    METASPCT 2 12/15/2021    LYMPHOPCT 1.8 12/17/2021    PROMYELOPCT 1 12/14/2021    MONOPCT 2.2 12/17/2021    MYELOPCT 1 12/14/2021    BASOPCT 0.1 12/17/2021    MONOSABS 0.4 12/17/2021    LYMPHSABS 0.3 12/17/2021    EOSABS 0.0 12/17/2021    BASOSABS 0.0 12/17/2021     CMP:    Lab Results   Component Value Date     12/17/2021    K 5.4 12/17/2021     12/17/2021    CO2 23 12/17/2021    BUN 68 12/17/2021    CREATININE 2.8 12/17/2021    CREATININE 2.51 12/17/2021    GFRAA 20 12/17/2021    LABGLOM 17 12/17/2021    GLUCOSE 223 12/17/2021    PROT 5.7 12/17/2021    LABALBU 2.0 12/17/2021    CALCIUM 8.6 12/17/2021    BILITOT 0.3 12/17/2021    ALKPHOS 110 12/17/2021    AST 15 12/17/2021    ALT 42 12/17/2021     BMP:    Lab Results   Component Value Date     12/17/2021    K 5.4 12/17/2021     12/17/2021    CO2 23 12/17/2021    BUN 68 12/17/2021    LABALBU 2.0 12/17/2021    CREATININE 2.8 12/17/2021    CREATININE 2.51 12/17/2021    CALCIUM 8.6 12/17/2021    GFRAA 20 12/17/2021    LABGLOM 17 12/17/2021    GLUCOSE 223 12/17/2021     TSH: No results found for: TSH  Vitamin B12 and Folate: No components found for: FOLIC,  C89  Urinalysis:   Lab Results   Component Value Date    NITRU Negative 12/15/2021    WBCUA 20-50 12/15/2021    BACTERIA Negative 12/15/2021    RBCUA  12/15/2021    BLOODU MODERATE 12/15/2021    SPECGRAV 1.019 12/15/2021    GLUCOSEU Negative 12/15/2021           FUNCTIONAL ADL´S:     Independent: [  ] Eating, [   ] Dressing, [   ] Transferring, [   ] Norma Emerson, [ are bilateral patchy alveolar opacities. Bones of the thorax appear intact. Status post intubation. There are bilateral alveolar opacities , infiltrates worrisome for viral COVID-19 pneumonia. Assessment and plan:      12/16/21:  Spoke with sister, Fernando Shah, to give update. Also sent her a completed form for her travel insurance as she had to cancel cruise due to the complexity of patient's medical condition and needing to make decisions for her. No other questions or concerns at this time. 12/15/21:  Discussed care and provided update to patient's sister, Fernando Shah. All questions answered. Will write letter for Eden's travel insurance, as she was suppose to go on a cruise but had to cancel due to her sister's condition and needing to make healthcare decisions for her. 1. Palliative care encounter  Explained the role of palliative care in treating patient. Discussed symptom management related to chronic disease/condition. Provided emotional support and active listening. Patient's sister understands and is agreeable to current plan. Spoke with intensivist, Dr. June Wilder, for update on patient's condition. Provided update to sister, Fernando Shah. -Advance Care Planning  Discussed goals of care with patient's sister Fernando Shah. Patient shall remain a FULL CODE at this time. HPOA in place: Tali Hines (sister). However, she is ill with COVID as well and is waiting for an ICU bed. Marizol Carballo currently at [facility]. Attempted to call her cell phone. If and when Marizol aCrballo able to communicate, will see if she is okay with sister Fernando Shah making decisions since she is currently critically ill also. Fernando Shah reports another brother and sister in [place]. Spoke with patient's nurse, [place].  Patient's sister, Marizol Carballo, is coming to ICU, and [place] will attempt to get consent from Marizol Carballo to let Fernando Shah take over decision-making for patient.    -Goals of Care Discussion:  Disease process and goals of treatment were discussed in basic terms. Hailey TUCKER's goal is to optimize available comfort care measures and continue with full treatment of COVID-19 infection. We discussed the palliative care philosophy in light of those goals. We discussed all care options contingent on treatment response and QOL. Much active listening, presence, and emotional support were given. Patient is being treated for multiple medical conditions this admission includin. Acute respiratory failure with hypoxia secondary to COVID-19 pneumonia  2. Hyperkalemia  3. Steroid induced hyperglycemia  4. Hypercoagulable state    Total Time: 25 minutes with >50% spent counseling/care coordination.      Electronically signed by TEJINDER Sheikh CNP on 2021 at 8:30 AM

## 2021-12-17 NOTE — PROGRESS NOTES
0800- patient in prone position, on ventilator support keeping O2 sat in 90s. Sinus rhythm on monitor. On levo at this time for BP support. Pt. Sedated and on Nimbex drip. TOF 4/4 twitches on 5 amps, Nimbex increased. Pt. nonresponsive to stimuli. Tube feed running, residual of 200cc. Serna in place. 0840- BP elevated into 180s-190s, levo stopped. TOF remains 4/4 twitches, nimbex increased, see MAR.    0900- during rounds notified Dr. Heath Figures of increased potassium, [place] Har[place] ordered. 1000- during dressing change with 2 RNs present, art line came out. Pressure/gauze held over site. BP cuff applied to other arm. Dr. Heath Figures notified. Pt. Remains off of levo at this time. Cannot change CVC dressing at this time due to pt. In prone position. Pt. Due to be supined at 1800 tonight. Dressing re-enforced with tape. Dressing clean and dry. TOF now 1/4 twiches, Nimbex drip remaining the same. 1200- TOF remains 1/4 twitches, levo remains off at this time. 1500- patient's phone, phone , and wallet given to pt.'s POA. 1600- pt. Remains in prone position, still off of levo, TOF reains 1/4 twitches, no changes in condition. 1700- tube feeding paused due to residual of 500cc. 1800- patient turned supine. Patient having large amounts of liquid stool, FMS placed. BMP drawn and sent to lab. CVC dressing changed. 1900- received critical BUN of 83, messaged Dr. Kianna Mccartney to inform him, also informed him of creatinine 3.01, U.O. of 400 over the shift, and potassium level 5.4. Rest of shift uneventful. Pt. Remained off of levo, BP stable. Handoff report given to [facility].

## 2021-12-17 NOTE — PROGRESS NOTES
Department of Internal Medicine  General Internal Medicine  Attending Progress Note      SUBJECTIVE:  Pt seen through ICU doors to reduce risk of exposure to COVID-19.  Proned today on 70% FiO2     OBJECTIVE      Medications    Current Facility-Administered Medications: heparin (porcine) injection 5,000 Units, 5,000 Units, SubCUTAneous, 3 times per day  famotidine (PEPCID) injection 10 mg, 10 mg, IntraVENous, Daily  meropenem (MERREM) 1,000 mg in sodium chloride 0.9 % 100 mL IVPB (mini-bag), 1,000 mg, IntraVENous, Q12H  0.9 % sodium chloride infusion, , IntraVENous, Continuous  insulin glargine (LANTUS) injection vial 10 Units, 10 Units, SubCUTAneous, Nightly  lactulose (CHRONULAC) 10 GM/15ML solution 20 g, 20 g, Oral, TID  polyethylene glycol (GLYCOLAX) packet 17 g, 17 g, Oral, Daily  senna (SENOKOT) 8.8 MG/5ML syrup 8.8 mg, 5 mL, Oral, BID  glucose (GLUTOSE) 40 % oral gel 15 g, 15 g, Oral, PRN  dextrose 50 % IV solution, 12.5 g, IntraVENous, PRN  glucagon (rDNA) injection 1 mg, 1 mg, IntraMUSCular, PRN  dextrose 5 % solution, 100 mL/hr, IntraVENous, PRN  insulin lispro (HUMALOG) injection vial 0-12 Units, 0-12 Units, SubCUTAneous, Q4H  dexmedetomidine (PRECEDEX) 1,000 mcg in sodium chloride 0.9 % 250 mL infusion, 0.2-1.4 mcg/kg/hr, IntraVENous, Continuous  propofol injection, 5-50 mcg/kg/min, IntraVENous, Titrated  fentaNYL (SUBLIMAZE) 1,000 mcg in sodium chloride 0.9 % 100 mL infusion, 12.5-200 mcg/hr, IntraVENous, Continuous  chlorhexidine (PERIDEX) 0.12 % solution 15 mL, 15 mL, Mouth/Throat, BID  fentaNYL (SUBLIMAZE) injection 50 mcg, 50 mcg, IntraVENous, Q1H PRN  norepinephrine (LEVOPHED) 16 mg in dextrose 5% 250 mL infusion, 2-100 mcg/min, IntraVENous, Continuous  cisatracurium besylate (NIMBEX) 200 mg in sodium chloride 0.9 % 100 mL infusion, 0.5-10 mcg/kg/min, IntraVENous, Continuous  [Held by provider] baricitinib (OLUMIANT) tablet 4 mg, 4 mg, Oral, Daily  sodium chloride flush 0.9 % injection 5-40 mL, 5-40 mL, IntraVENous, 2 times per day  sodium chloride flush 0.9 % injection 5-40 mL, 5-40 mL, IntraVENous, PRN  0.9 % sodium chloride infusion, 25 mL, IntraVENous, PRN  ondansetron (ZOFRAN-ODT) disintegrating tablet 4 mg, 4 mg, Oral, Q8H PRN **OR** ondansetron (ZOFRAN) injection 4 mg, 4 mg, IntraVENous, Q6H PRN  acetaminophen (TYLENOL) tablet 650 mg, 650 mg, Oral, Q6H PRN **OR** acetaminophen (TYLENOL) suppository 650 mg, 650 mg, Rectal, Q6H PRN  dexamethasone (DECADRON) injection 6 mg, 6 mg, IntraVENous, Q24H  Physical    VITALS:  BP (!) 103/47   Pulse 76   Temp 97.7 °F (36.5 °C) (Rectal)   Resp (!) 34   Ht 5' 1\" (1.549 m)   Wt 210 lb 15.7 oz (95.7 kg)   SpO2 100%   BMI 39.86 kg/m²   Constitutional: intubated and sedated, proned  Head: Normocephalic, atraumatic  ENT: ETT in place  Neck: neck supple, trachea midline  Lungs: non labored  Heart: RRR on tele  GI: non-distended  MSK: no edema noted     Data    CBC:   Lab Results   Component Value Date    WBC 17.9 12/17/2021    RBC 3.76 12/17/2021    HGB 12.3 12/17/2021    HCT 33.4 12/17/2021    MCV 88.7 12/17/2021    MCH 28.3 12/17/2021    MCHC 32.0 12/17/2021    RDW 14.5 12/17/2021     12/17/2021     CMP:    Lab Results   Component Value Date     12/17/2021    K 5.4 12/17/2021     12/17/2021    CO2 23 12/17/2021    BUN 68 12/17/2021    CREATININE 2.8 12/17/2021    CREATININE 2.51 12/17/2021    GFRAA 20 12/17/2021    LABGLOM 17 12/17/2021    GLUCOSE 223 12/17/2021    PROT 5.7 12/17/2021    LABALBU 2.0 12/17/2021    CALCIUM 8.6 12/17/2021    BILITOT 0.3 12/17/2021    ALKPHOS 110 12/17/2021    AST 15 12/17/2021    ALT 42 12/17/2021       ASSESSMENT AND PLAN      # Acute hypoxic respiratory failure 2/2 COVID-19 pneumonia, MSSA pneumonia  - completed Remdesivir, continue decadron  - increasing vent demand today - on nimbex.  Proning per pulm recs  - ABG today 7.24/61/126  - pulm consulted  - ID consulted- meropenem  - pressors as needed  - RCx with MSSA    # SHLOMO- worsening  - 2/2 dehydration vs hypotension  - Cr 2.51 today  - monitor UOP  - monitor with labs - replace electrolytes as needed  - nephrology consulted    # Hyperglycemia and new onset DM  - initially thought 2/2 steroids, but A1C 7.1 so pt is now diabetic  - monitor, ISS    DVT: lovenox    Disposition: Continues to require ICU care. Continuing to monitor UOP. Pulm, ID consulted.        [facility] DO  Internal Medicine   Hospitalist    >35 minutes in total care time

## 2021-12-18 NOTE — PROGRESS NOTES
Infectious Disease Progress Note       12/18/2021    Patient is a followup regarding critical covid 19 pneumonia and   Superimposed HAP with MSSA, pseudomonas ( MDS) UTI, Acute respiratory failure, and sinusitis    SHLOMO noted. Currently on Meropenem    Exam assisted by nurse  Supine,   Neck supple  Chest equal expansion  Abdomen ND  Extrem no new changes  Expressions symmetrical  No obvious rashes. No wounds  +FMS and Serna with output      Lab Results   Component Value Date    WBC 17.6 (H) 12/18/2021    HGB 10.5 (L) 12/18/2021    HCT 31.4 (L) 12/18/2021    MCV 89.7 12/18/2021     (H) 12/18/2021     Lab Results   Component Value Date     12/18/2021    K 5.1 12/18/2021     12/18/2021    CO2 20 12/18/2021    BUN 93 12/18/2021    CREATININE 3.3 12/18/2021    CREATININE 3.20 12/18/2021    GLUCOSE 186 12/18/2021    CALCIUM 8.7 12/18/2021        WBC trends are being monitored. Antibiotic doses are being adjusted per most recent renal labs.      Vitals:    12/18/21 1800   BP: (!) 96/53   Pulse: (!) 45   Resp: (!) 34   Temp:    SpO2: 97%           Patient Active Problem List   Diagnosis    Pneumonia due to COVID-19 virus    Acute respiratory failure with hypoxia (HCC)    Hypercoagulable state (HCC)    Anemia    Anatomical narrow angle, bilateral    Bilateral carpal tunnel syndrome    Bilateral hearing loss    Chronic bilateral low back pain without sciatica    Floaters    Hyperopia    Lamellar macular hole of right eye    Meralgia paraesthetica, left    Murmur    Obesity, Class III, BMI 40-49.9 (morbid obesity) (HCC)    Presbyopia of both eyes    Primary osteoarthritis of both knees    Pseudophakia of both eyes    Regular astigmatism    Tinnitus of both ears    Vitreomacular traction syndrome of right eye    Elevated liver function tests    Advance care planning    Goals of care, counseling/discussion    Palliative care encounter    Acute bacterial sinusitis    Acute [facility]    Staphylococcus aureus pneumonia ([facility] [facility]    Pseudomonas urinary tract infection           ASSESSMENT:  · Acute hypoxic respiratory failure with ARDS  · Severe COVID-19 pneumonia  · Septic shock  · MSSA pneumonia/ HAP  · Pseudomonas UTI  · SHLOMO        PLAN:  Meropenem started 12/17. Plan on 10 days total course. Sputum culture - thick white yellow  Discussed with critical care nurse.      Imaging and labs were reviewed per medical records and any ID pertinent labs were also addressed    Madison Duke DO

## 2021-12-18 NOTE — PROGRESS NOTES
Renal Progress Note    Assessment and Plan:     67 yo lady with severe covid 19 PNA. symptots started at very end of November. Admitted on 12/9. Intubated on 12/12. Worsening kidney function now. Not on pressors. Would consider her SHLOMO to be ATN. Has hyperkalemia     Plan/  1- I called sister Anika Blevins to update her yesterday  2- no indication for RRT at current time - despite worsening shlomo, I am hopeful we will be able to avoid it  3. k has improved  4. D/w critical care    Patient Active Problem List:     Pneumonia due to COVID-19 virus     Acute respiratory failure with hypoxia (HCC)     Hypercoagulable state ([address]     Anemia     Anatomical narrow angle, bilateral     Bilateral carpal tunnel syndrome     Bilateral hearing loss     Chronic bilateral low back pain without sciatica     Floaters     Hyperopia     Lamellar macular hole of right eye     Meralgia paraesthetica, left     Murmur     Obesity, Class III, BMI 40-49.9 (morbid obesity) (HCC)     Presbyopia of both eyes     Primary osteoarthritis of both knees     Pseudophakia of both eyes     Regular astigmatism     Tinnitus of both ears     Vitreomacular traction syndrome of right eye     Elevated liver function tests     Advance care planning     Goals of care, counseling/discussion     Palliative care encounter     Acute bacterial sinusitis     Acute kidney injury ([address]     Staphylococcus aureus pneumonia ([address]     Pseudomonas urinary tract infection      Subjective:   Admit Date: 12/9/2021    Interval History: renal labs worse but k better. Starting to make more urine. On abx.   FiO2 about same (70%)      Medications:   Scheduled Meds:   insulin glargine  15 Units SubCUTAneous Nightly    heparin (porcine)  5,000 Units SubCUTAneous 3 times per day    meropenem  500 mg IntraVENous Q12H    famotidine (PEPCID) injection  10 mg IntraVENous Daily    lactulose  20 g Oral TID    polyethylene glycol  17 g Oral Daily    senna  5 mL Oral BID    insulin lispro  0-12 Units SubCUTAneous Q4H    chlorhexidine  15 mL Mouth/Throat BID    [Held by provider] baricitinib  4 mg Oral Daily    sodium chloride flush  5-40 mL IntraVENous 2 times per day    dexamethasone  6 mg IntraVENous Q24H     Continuous Infusions:   dextrose      dexmedetomidine (PRECEDEX) IV infusion Stopped (12/12/21 0204)    propofol 40 mcg/kg/min (12/18/21 0836)    fentaNYL (SUBLIMAZE) infusion 150 mcg/hr (12/18/21 0854)    norepinephrine 2 mcg/min (12/18/21 0933)    sodium chloride         CBC:   Recent Labs     12/17/21  0600 12/17/21  0754 12/18/21  0600 12/18/21  0855   WBC 17.9*  --  17.6*  --    HGB 10.7*   < > 9.9* 11.6*   *  --  426*  --     < > = values in this interval not displayed. CMP:    Recent Labs     12/17/21  0600 12/17/21  0754 12/17/21  1800 12/17/21  1800 12/18/21  0004 12/18/21  0600 12/18/21  0855     --  137  --   --  138  --    K 5.4*  --  5.4*  --   --  5.1*  --      --  102  --   --  104  --    CO2 23  --  22  --   --  20  --    BUN 68*  --  83*  --   --  93*  --    CREATININE 2.51*   < > 3.01*   < > 3.2* 3.20* 3.4*   GLUCOSE 223*  --  144*  --   --  186*  --    CALCIUM 8.6  --  8.9  --   --  8.7  --    LABGLOM 18.8*   < > 15.3*   < > 14* 14.2* 13*    < > = values in this interval not displayed. Troponin: No results for input(s): TROPONINI in the last 72 hours. BNP: No results for input(s): BNP in the last 72 hours. INR: No results for input(s): INR in the last 72 hours. Lipids: No results for input(s): CHOL, LDLDIRECT, TRIG, HDL, AMYLASE, LIPASE in the last 72 hours. Liver:   Recent Labs     12/18/21  0600   AST 13   ALT 31   ALKPHOS 121   PROT 5.6*   LABALBU 2.1*   BILITOT 0.3     Iron:    Recent Labs     12/16/21  0702   FERRITIN 1,655*     Urinalysis: No results for input(s): UA in the last 72 hours.     Objective:   Vitals: BP (!) 155/64   Pulse 80   Temp 97.5 °F (36.4 °C) (Oral)   Resp (!) 34   Ht 5' 1\" (1.549 m)   Wt 226 lb 13.7 oz (102.9 kg)   SpO2 97%   BMI 42.86 kg/m²    Wt Readings from Last 3 Encounters:   12/18/21 226 lb 13.7 oz (102.9 kg)   12/09/21 200 lb (90.7 kg)   12/13/11 237 lb (107.5 kg)      24HR INTAKE/OUTPUT:      Intake/Output Summary (Last 24 hours) at 12/18/2021 0936  Last data filed at 12/18/2021 0545  Gross per 24 hour   Intake 3078 ml   Output 1400 ml   Net 1678 ml       Constitutional:  Intubated, sedated on ventilator  Skin:normal, no rash  HEENT:sclera anicteric.   Head atraumatic normocephalic  Neck:supple with no thyromegally  Cardiovascular:  S1, S2 without m/r/g   Respiratory:  CTA B without w/r/r   Abdomen: +bs, soft, nt  Ext: 1+ LE edema  Musculoskeletal:Intact  Neuro: Sedated on vent      Electronically signed by Belem Zambrano MD on 12/18/2021 at 9:36 AM

## 2021-12-18 NOTE — PROGRESS NOTES
Pulmonary & Critical Care Medicine ICU Progress Note  Chief complaint : Respiratory failure    Subjunctive/24 hour events :   Patient seen and examined during multidisciplinary rounds with RN, charge nurse, RT, pharmacy, dietitian, and social service. On vent, currently in prone position, she is on Levophed at 4 mcg/min, on fentanyl and propofol, also on Nimbex drip. She is on 70% FiO2, 10 of PEEP, saturation 98%, no fever, urine output 900 cc, she is +9.3 L.+ BM, tolerates tube feed       Social History     Tobacco Use    Smoking status: Never Smoker    Smokeless tobacco: Never Used   Substance Use Topics    Alcohol use: No         Problem Relation Age of Onset    High Blood Pressure Mother        Recent Labs     12/18/21  0004 12/18/21  0855   PHART 7.244* 7.188*   DSE8CGL 55* 61*   PO2ART 105* 125*       MV Settings:  Vent Mode: AC/VC+ Rate Set: 34 bmp/Vt Ordered: 380 mL/ /FiO2 : 70 %           IV:   sodium chloride 50 mL/hr at 12/17/21 1325    dextrose      dexmedetomidine (PRECEDEX) IV infusion Stopped (12/12/21 0204)    propofol 40 mcg/kg/min (12/18/21 0836)    fentaNYL (SUBLIMAZE) infusion 150 mcg/hr (12/18/21 0854)    norepinephrine 4 mcg/min (12/18/21 0530)    cisatracurium (NIMBEX) infusion 2.3 mcg/kg/min (12/18/21 0415)    sodium chloride         Vitals:  BP (!) 122/55   Pulse 72   Temp 97.5 °F (36.4 °C) (Oral)   Resp 30   Ht 5' 1\" (1.549 m)   Wt 226 lb 13.7 oz (102.9 kg)   SpO2 98%   BMI 42.86 kg/m²    Tmax:        Intake/Output Summary (Last 24 hours) at 12/18/2021 0913  Last data filed at 12/18/2021 0545  Gross per 24 hour   Intake 3078 ml   Output 1400 ml   Net 1678 ml       EXAM:  General: On vent, currently in prone position  Head: normocephalic, atraumatic  Eyes:No gross abnormalities.   ENT:  MMM no lesions, ET and OG tube in  Neck:  supple and no masses  Chest : Good air movement, bilateral rales, no wheezing, nontender, tympanic  Heart[de-identified] Heart sounds are normal.  Regular rate and rhythm without murmur, gallop or rub. ABD:  symmetric, soft, no apparent tenderness  Musculoskeletal : no cyanosis, no clubbing and no edema  Neuro:  Sedated and paralyzed  Skin: No rashes or nodules noted. Lymph node:  no cervical nodes  Urology: Yes Serna   Psychiatric: unresponsive    Medications:  Scheduled Meds:   heparin (porcine)  5,000 Units SubCUTAneous 3 times per day    meropenem  500 mg IntraVENous Q12H    famotidine (PEPCID) injection  10 mg IntraVENous Daily    insulin glargine  10 Units SubCUTAneous Nightly    lactulose  20 g Oral TID    polyethylene glycol  17 g Oral Daily    senna  5 mL Oral BID    insulin lispro  0-12 Units SubCUTAneous Q4H    chlorhexidine  15 mL Mouth/Throat BID    [Held by provider] baricitinib  4 mg Oral Daily    sodium chloride flush  5-40 mL IntraVENous 2 times per day    dexamethasone  6 mg IntraVENous Q24H       PRN Meds:  glucose, dextrose, glucagon (rDNA), dextrose, fentanNYL, sodium chloride flush, sodium chloride, ondansetron **OR** ondansetron, acetaminophen **OR** acetaminophen    Results: reviewed by me   CBC:   Recent Labs     12/16/21  1035 12/16/21  1651 12/17/21  0600 12/17/21  0754 12/18/21  0004 12/18/21  0600 12/18/21  0855   WBC 19.8*  --  17.9*  --   --  17.6*  --    HGB 11.7*   < > 10.7*   < > 10.9* 9.9* 11.6*   HCT 38.0  --  33.4*  --   --  31.4*  --    MCV 89.8  --  88.7  --   --  89.7  --    *  --  472*  --   --  426*  --     < > = values in this interval not displayed. BMP:   Recent Labs     12/17/21  0600 12/17/21  0754 12/17/21  1558 12/17/21  1800 12/18/21  0004 12/18/21  0600 12/18/21  0855     --   --  137  --  138  --    K 5.4*  --   --  5.4*  --  5.1*  --      --   --  102  --  104  --    CO2 23  --   --  22  --  20  --    BUN 68*  --   --  83*  --  93*  --    CREATININE 2.51*   < >   < > 3.01* 3.2* 3.20* 3.4*    < > = values in this interval not displayed.      LIVER PROFILE:   Recent Labs 12/16/21  1035 12/17/21  0600 12/18/21  0600   AST 23 15 13   ALT 59* 42* 31   BILITOT 0.5 0.3 0.3   ALKPHOS 130 110 121     PT/INR: No results for input(s): PROTIME, INR in the last 72 hours. APTT: No results for input(s): APTT in the last 72 hours. UA:  Recent Labs     12/15/21  1817   COLORU Yellow   PHUR 5.0   WBCUA 20-50*   RBCUA *   BACTERIA Negative   CLARITYU CLOUDY*   SPECGRAV 1.019   LEUKOCYTESUR SMALL*   UROBILINOGEN 1.0   BILIRUBINUR Negative   BLOODU MODERATE*   GLUCOSEU Negative       Cultures:  Sputum culture shows MSSA, urine culture Pseudomonas  Films:  CXR reviewed by me and it showed lateral groundglass infiltrates      Assessment:   This is a critically ill patient at risk of deterioration / death , needing close ICU monitoring and intervention due to below noted problems   · Acute hypoxic respiratory failure  · Severe COVID-19 pneumonia  · ARDS  · Septic shock  · MSSA pneumonia due to bacterial coinfection  · Pseudomonas UTI  · SHLOMO    Recommendation  · Vent support lung protective strategy  · head of the bed 30°  · Breathing trials and sedation holidays when lung mechanics improve  · Watch for ICU delirium: TV on, natural light, avoid benzos, pain control, early mobility, and family engagement  · PUD prophylaxis  · DVT prophylaxis  · DC Nimbex  · Levophed to maintain mean arterial pressure 65-70  · 18 hours prone, 6 hours supine if PF ratio remains less than 150 when supine  · Adjust vent setting to attempt to correct current ABG  · Watch renal function and urine output  · Appreciate nephrology  · Target blood sugar 140-180  · Continue tube feed  ·  increase Lantus to 15 units  · Resume baricitinib, lymphocyte improved  · DC IV fluid, if needed will consider bicarb drip        Due to the immediate potential for life-threatening deterioration due to acute respiratory failure I spent 35  minutes providing critical care.  This time is excluding time spent performing procedures.           Electronically signed by Miryam Phipps MD,  FCCP ,on 12/18/2021 at 9:13 AM

## 2021-12-18 NOTE — PROGRESS NOTES
Assessment complete, pt sedated on vent. 0 twitches noted on train of fours, nimbex titrated accordingly. 100 cc residual noted with tube feed. Oral care provided.

## 2021-12-18 NOTE — PROGRESS NOTES
Department of Internal Medicine  General Internal Medicine  Attending Progress Note      SUBJECTIVE:  Pt seen through ICU doors to reduce risk of exposure to COVID-19.  Proned today on 70% FiO2     OBJECTIVE      Medications    Current Facility-Administered Medications: insulin glargine (LANTUS) injection vial 15 Units, 15 Units, SubCUTAneous, Nightly  baricitinib (OLUMIANT) tablet 1 mg, 1 mg, Oral, Daily  heparin (porcine) injection 5,000 Units, 5,000 Units, SubCUTAneous, 3 times per day  meropenem (MERREM) 500 mg IVPB (mini-bag), 500 mg, IntraVENous, Q12H  famotidine (PEPCID) injection 10 mg, 10 mg, IntraVENous, Daily  lactulose (CHRONULAC) 10 GM/15ML solution 20 g, 20 g, Oral, TID  polyethylene glycol (GLYCOLAX) packet 17 g, 17 g, Oral, Daily  senna (SENOKOT) 8.8 MG/5ML syrup 8.8 mg, 5 mL, Oral, BID  glucose (GLUTOSE) 40 % oral gel 15 g, 15 g, Oral, PRN  dextrose 50 % IV solution, 12.5 g, IntraVENous, PRN  glucagon (rDNA) injection 1 mg, 1 mg, IntraMUSCular, PRN  dextrose 5 % solution, 100 mL/hr, IntraVENous, PRN  insulin lispro (HUMALOG) injection vial 0-12 Units, 0-12 Units, SubCUTAneous, Q4H  dexmedetomidine (PRECEDEX) 1,000 mcg in sodium chloride 0.9 % 250 mL infusion, 0.2-1.4 mcg/kg/hr, IntraVENous, Continuous  propofol injection, 5-50 mcg/kg/min, IntraVENous, Titrated  fentaNYL (SUBLIMAZE) 1,000 mcg in sodium chloride 0.9 % 100 mL infusion, 12.5-200 mcg/hr, IntraVENous, Continuous  chlorhexidine (PERIDEX) 0.12 % solution 15 mL, 15 mL, Mouth/Throat, BID  fentaNYL (SUBLIMAZE) injection 50 mcg, 50 mcg, IntraVENous, Q1H PRN  norepinephrine (LEVOPHED) 16 mg in dextrose 5% 250 mL infusion, 2-100 mcg/min, IntraVENous, Continuous  sodium chloride flush 0.9 % injection 5-40 mL, 5-40 mL, IntraVENous, 2 times per day  sodium chloride flush 0.9 % injection 5-40 mL, 5-40 mL, IntraVENous, PRN  0.9 % sodium chloride infusion, 25 mL, IntraVENous, PRN  ondansetron (ZOFRAN-ODT) disintegrating tablet 4 mg, 4 mg, Oral, Q8H PRN **OR** ondansetron (ZOFRAN) injection 4 mg, 4 mg, IntraVENous, Q6H PRN  acetaminophen (TYLENOL) tablet 650 mg, 650 mg, Oral, Q6H PRN **OR** acetaminophen (TYLENOL) suppository 650 mg, 650 mg, Rectal, Q6H PRN  dexamethasone (DECADRON) injection 6 mg, 6 mg, IntraVENous, Q24H  Physical    VITALS:  /64   Pulse 55   Temp 97.6 °F (36.4 °C) (Oral)   Resp (!) 34   Ht 5' 1\" (1.549 m)   Wt 226 lb 13.7 oz (102.9 kg)   SpO2 98%   BMI 42.86 kg/m²   Constitutional: intubated and sedated, proned  Head: Normocephalic, atraumatic  ENT: ETT in place  Neck: neck supple, trachea midline  Lungs: non labored  Heart: RRR on tele  GI: non-distended  MSK: no edema noted     Data    CBC:   Lab Results   Component Value Date    WBC 17.6 12/18/2021    RBC 3.50 12/18/2021    HGB 10.5 12/18/2021    HCT 31.4 12/18/2021    MCV 89.7 12/18/2021    MCH 28.2 12/18/2021    MCHC 31.4 12/18/2021    RDW 14.6 12/18/2021     12/18/2021     CMP:    Lab Results   Component Value Date     12/18/2021    K 5.1 12/18/2021     12/18/2021    CO2 20 12/18/2021    BUN 93 12/18/2021    CREATININE 3.3 12/18/2021    CREATININE 3.20 12/18/2021    GFRAA 17 12/18/2021    LABGLOM 14 12/18/2021    GLUCOSE 186 12/18/2021    PROT 5.6 12/18/2021    LABALBU 2.1 12/18/2021    CALCIUM 8.7 12/18/2021    BILITOT 0.3 12/18/2021    ALKPHOS 121 12/18/2021    AST 13 12/18/2021    ALT 31 12/18/2021       ASSESSMENT AND PLAN      # Acute hypoxic respiratory failure 2/2 COVID-19 pneumonia, MSSA pneumonia  - completed Remdesivir, continue decadron  - increasing vent demand today - on nimbex.  Proning per pulm recs  - ABG today 7.18/61/125 despite vent adjustments  - pulm consulted  - ID consulted- meropenem  - pressors as needed  - RCx with MSSA, UCx with pseudomonas - on abx    # SHLOMO- worsening  - 2/2 dehydration vs hypotension  - Cr 3.20 today  - monitor UOP  - monitor with labs - replace electrolytes as needed  - nephrology consulted    # Hyperglycemia and

## 2021-12-18 NOTE — PROGRESS NOTES
2000- upon assessment, patient was in supine position. At that time tube feed was on hold due to high residuals during the day. Residual was only 50 mL, tube feed was restarted. Patient's lung sounds were clear but diminished. Skin intact but has large bruise on left wrist.     2300- gave patient chlorhexidine solution bath. 0000- RT and 4 nurses in room to prone patient. Mepilexes applied to bony prominences on anterior side and pillows placed under right shoulder to relieve pressure off face and under feet. Sats went up to 98-99%. 0300- patient's BP and MAP had been trending downward. MAP began to go below 65. Asked Dr. Wilver Hernandez if levo should be restarted and he said yes. 1903- levo restarted at 2 mcg/min.    0600- patient remained stable through the night. 4365- patient's sister called for an update, HIPPA code received and update given.

## 2021-12-18 NOTE — PROGRESS NOTES
Pt has increased movement of upper extremities, coughing and resisting vent, Dr. Ze Gacria, nimbex restarted.

## 2021-12-19 NOTE — PROGRESS NOTES
Renal Progress Note    Assessment and Plan:     67 yo lady with severe covid 19 PNA. symptots started at very end of November. Admitted on 12/9. Intubated on 12/12. Worsening kidney function now. Not on pressors. Would consider her SHLOMO to be ATN. Has hyperkalemia     Plan/  1- I called sister Maria Guadalupe May on 12/17 to update her  2- no indication for RRT at current time - despite worsening shlomo, I am hopeful we will be able to avoid it  3. k has improved      Patient Active Problem List:     Pneumonia due to COVID-19 virus     Acute respiratory failure with hypoxia ([address]     Hypercoagulable state ([address]     Anemia     Anatomical narrow angle, bilateral     Bilateral carpal tunnel syndrome     Bilateral hearing loss     Chronic bilateral low back pain without sciatica     Floaters     Hyperopia     Lamellar macular hole of right eye     Meralgia paraesthetica, left     Murmur     Obesity, Class III, BMI 40-49.9 (morbid obesity) ([address]     Presbyopia of both eyes     Primary osteoarthritis of both knees     Pseudophakia of both eyes     Regular astigmatism     Tinnitus of both ears     Vitreomacular traction syndrome of right eye     Elevated liver function tests     Advance care planning     Goals of care, counseling/discussion     Palliative care encounter     Acute bacterial sinusitis     Acute kidney injury ([address]     Staphylococcus aureus pneumonia ([address]     Pseudomonas urinary tract infection      Subjective:   Admit Date: 12/9/2021    Interval History: FiO2 down slightly to 60%. Currently prone. Urine outputs been good. BUN has been rising. Got Lasix yesterday.       Medications:   Scheduled Meds:   insulin glargine  15 Units SubCUTAneous Nightly    baricitinib  1 mg Oral Daily    heparin (porcine)  5,000 Units SubCUTAneous 3 times per day    meropenem  500 mg IntraVENous Q12H    famotidine (PEPCID) injection  10 mg IntraVENous Daily    lactulose  20 g Oral TID    polyethylene glycol  17 g Oral Daily    senna 5 mL Oral BID    insulin lispro  0-12 Units SubCUTAneous Q4H    chlorhexidine  15 mL Mouth/Throat BID    sodium chloride flush  5-40 mL IntraVENous 2 times per day     Continuous Infusions:   dextrose      dexmedetomidine (PRECEDEX) IV infusion 0.8 mcg/kg/hr (12/19/21 0811)    propofol 50 mcg/kg/min (12/19/21 0746)    fentaNYL (SUBLIMAZE) infusion 200 mcg/hr (12/19/21 0650)    norepinephrine 8 mcg/min (12/19/21 0442)    sodium chloride         CBC:   Recent Labs     12/18/21  0600 12/18/21  0855 12/19/21  0600 12/19/21  0743   WBC 17.6*  --  20.4*  --    HGB 9.9*   < > 10.8* 12.2   *  --  592*  --     < > = values in this interval not displayed. CMP:    Recent Labs     12/17/21  1800 12/18/21  0004 12/18/21  0600 12/18/21  0855 12/18/21  2359 12/19/21  0600 12/19/21  0743     --  138  --   --  141  --    K 5.4*  --  5.1*  --   --  5.0*  --      --  104  --   --  108*  --    CO2 22  --  20  --   --  19*  --    BUN 83*  --  93*  --   --  112*  --    CREATININE 3.01*   < > 3.20*   < > 3.2* 3.12* 3.4*   GLUCOSE 144*  --  186*  --   --  113*  --    CALCIUM 8.9  --  8.7  --   --  9.2  --    LABGLOM 15.3*   < > 14.2*   < > 14* 14.7* 13*    < > = values in this interval not displayed. Troponin: No results for input(s): TROPONINI in the last 72 hours. BNP: No results for input(s): BNP in the last 72 hours. INR: No results for input(s): INR in the last 72 hours. Lipids: No results for input(s): CHOL, LDLDIRECT, TRIG, HDL, AMYLASE, LIPASE in the last 72 hours. Liver:   Recent Labs     12/19/21  0600   AST 19   ALT 36*   ALKPHOS 146*   PROT 6.5   LABALBU 2.5*   BILITOT 0.3     Iron:    No results for input(s): IRONS, FERRITIN in the last 72 hours. Invalid input(s): LABIRONS  Urinalysis: No results for input(s): UA in the last 72 hours.     Objective:   Vitals: BP (!) 124/52   Pulse 60   Temp 98.6 °F (37 °C) (Rectal)   Resp (!) 32   Ht 5' 1\" (1.549 m)   Wt 226 lb 13.7 oz (102.9 kg) SpO2 95%   BMI 42.86 kg/m²    Wt Readings from Last 3 Encounters:   12/18/21 226 lb 13.7 oz (102.9 kg)   12/09/21 200 lb (90.7 kg)   12/13/11 237 lb (107.5 kg)      24HR INTAKE/OUTPUT:      Intake/Output Summary (Last 24 hours) at 12/19/2021 1048  Last data filed at 12/18/2021 2000  Gross per 24 hour   Intake 1259.03 ml   Output 900 ml   Net 359.03 ml       Constitutional:  Intubated, sedated on ventilator  Skin:normal, no rash  HEENT:sclera anicteric.   Head atraumatic normocephalic  Neck:supple with no thyromegally  Cardiovascular:  S1, S2 without m/r/g   Respiratory:  CTA B without w/r/r   Abdomen: +bs, soft, nt  Ext: 1+ LE edema  Musculoskeletal:Intact  Neuro: Sedated on vent      Electronically signed by Jacques Bond MD on 12/19/2021 at 10:48 AM

## 2021-12-19 NOTE — PROGRESS NOTES
Pulmonary & Critical Care Medicine ICU Progress Note  Chief complaint : Respiratory failure    Subjunctive/24 hour events :   Patient seen and examined during multidisciplinary rounds with RN, charge nurse, RT, pharmacy, dietitian, and social service. On vent, in prone position, currently on 60% FiO2, 10 of PEEP, saturation 96%, she is on Levophed at 8 mcg/min, sedated with Precedex propofol and fentanyl, she is off Nimbex drip, peak pressure is high, unable to check plateau pressure on current ventilator, adjusted tidal volume to target peak pressures in the mid 30s, temperature 98.6,+ BM, had supine trial at 5 PM and tolerated     Social History     Tobacco Use    Smoking status: Never Smoker    Smokeless tobacco: Never Used   Substance Use Topics    Alcohol use: No         Problem Relation Age of Onset    High Blood Pressure Mother        Recent Labs     12/18/21  2359 12/19/21  0743   PHART 7.266* 7.261*   [id number] 48* 50*   PO2ART 76* 144*       MV Settings:  Vent Mode: AC/VC Rate Set: 32 bmp/Vt Ordered: 380 mL/ /FiO2 : (S) 60 %           IV:   cisatracurium (NIMBEX) infusion Stopped (12/18/21 1726)    dextrose      dexmedetomidine (PRECEDEX) IV infusion 0.8 mcg/kg/hr (12/19/21 0811)    propofol 50 mcg/kg/min (12/19/21 0746)    fentaNYL (SUBLIMAZE) infusion 200 mcg/hr (12/19/21 0650)    norepinephrine 8 mcg/min (12/19/21 0442)    sodium chloride         Vitals:  BP (!) 124/52   Pulse 65   Temp 98.6 °F (37 °C) (Rectal)   Resp 16   Ht 5' 1\" (1.549 m)   Wt 226 lb 13.7 oz (102.9 kg)   SpO2 96%   BMI 42.86 kg/m²    Tmax:        Intake/Output Summary (Last 24 hours) at 12/19/2021 0844  Last data filed at 12/18/2021 2000  Gross per 24 hour   Intake 1259.03 ml   Output 900 ml   Net 359.03 ml       EXAM:  General: On vent, currently in prone position  Head: normocephalic, atraumatic  Eyes:No gross abnormalities.   ENT:  MMM no lesions, ET and OG tube in  Neck:  supple and no masses  Chest : Good air movement, bilateral rales, no wheezing, nontender, tympanic  Heart[de-identified] Heart sounds are normal.  Regular rate and rhythm without murmur, gallop or rub. ABD:  symmetric, soft, no apparent tenderness  Musculoskeletal : no cyanosis, no clubbing and no edema  Neuro: Sedated, did not wake up or follow commands  Skin: No rashes or nodules noted. Lymph node:  no cervical nodes  Urology: Yes Serna   Psychiatric: unresponsive, sedated    Medications:  Scheduled Meds:   insulin glargine  15 Units SubCUTAneous Nightly    baricitinib  1 mg Oral Daily    heparin (porcine)  5,000 Units SubCUTAneous 3 times per day    meropenem  500 mg IntraVENous Q12H    famotidine (PEPCID) injection  10 mg IntraVENous Daily    lactulose  20 g Oral TID    polyethylene glycol  17 g Oral Daily    senna  5 mL Oral BID    insulin lispro  0-12 Units SubCUTAneous Q4H    chlorhexidine  15 mL Mouth/Throat BID    sodium chloride flush  5-40 mL IntraVENous 2 times per day       PRN Meds:  glucose, dextrose, glucagon (rDNA), dextrose, fentanNYL, sodium chloride flush, sodium chloride, ondansetron **OR** ondansetron, acetaminophen **OR** acetaminophen    Results: reviewed by me   CBC:   Recent Labs     12/17/21  0600 12/17/21  0754 12/18/21  0600 12/18/21  0855 12/18/21 2359 12/19/21 0600 12/19/21  0743   WBC 17.9*  --  17.6*  --   --  20.4*  --    HGB 10.7*   < > 9.9*   < > 11.8* 10.8* 12.2   HCT 33.4*  --  31.4*  --   --  33.8*  --    MCV 88.7  --  89.7  --   --  88.6  --    *  --  426*  --   --  592*  --     < > = values in this interval not displayed.      BMP:   Recent Labs     12/17/21  1800 12/18/21  0004 12/18/21  0600 12/18/21  0855 12/18/21  2359 12/19/21  0600 12/19/21  0743     --  138  --   --  141  --    K 5.4*  --  5.1*  --   --  5.0*  --      --  104  --   --  108*  --    CO2 22  --  20  --   --  19*  --    BUN 83*  --  93*  --   --  112*  --    CREATININE 3.01*   < > 3.20*   < > 3.2* 3.12* 3.4*    < > = values in this interval not displayed. LIVER PROFILE:   Recent Labs     12/17/21  0600 12/18/21  0600 12/19/21  0600   AST 15 13 19   ALT 42* 31 36*   BILITOT 0.3 0.3 0.3   ALKPHOS 110 121 146*     PT/INR: No results for input(s): PROTIME, INR in the last 72 hours. APTT: No results for input(s): APTT in the last 72 hours. UA:  No results for input(s): NITRITE, COLORU, PHUR, LABCAST, WBCUA, RBCUA, MUCUS, TRICHOMONAS, YEAST, BACTERIA, CLARITYU, SPECGRAV, LEUKOCYTESUR, UROBILINOGEN, BILIRUBINUR, BLOODU, GLUCOSEU, AMORPHOUS in the last 72 hours. Invalid input(s): KETONESU    Cultures:  Sputum culture shows MSSA, urine culture Pseudomonas  Films:  CXR reviewed by me and it showed lateral groundglass infiltrates      Assessment: This is a critically ill patient at risk of deterioration / death , needing close ICU monitoring and intervention due to below noted problems   · Acute hypoxic respiratory failure  · Severe COVID-19 pneumonia  · ARDS  · Septic shock  · MSSA pneumonia due to bacterial coinfection  · Pseudomonas UTI  · SHLOMO    Recommendation  · Vent support lung protective strategy  · head of the bed 30°  · Breathing trials and sedation holidays when lung mechanics improve  · Watch for ICU delirium: TV on, natural light, avoid benzos, pain control, early mobility, and family engagement  · PUD prophylaxis  · DVT prophylaxis  · DC Nimbex  · Levophed to maintain mean arterial pressure 65-70  · Recent PF ratio 205, will DC prone position and watch ABG  · Watch renal function and urine output  · Appreciate nephrology  · Target blood sugar 140-180  · Continue tube feed  · Resume baricitinib, lymphocyte improved  · Procalcitonin and D-dimer in a.m. Due to the immediate potential for life-threatening deterioration due to acute respiratory failure I spent 35  minutes providing critical care.  This time is excluding time spent performing procedures.           Electronically signed by Breezy Rouse MD,  FCCP ,on 12/19/2021 at 8:44 AM

## 2021-12-19 NOTE — PROGRESS NOTES
Department of Internal Medicine  General Internal Medicine  Attending Progress Note      SUBJECTIVE:  Pt seen through ICU doors to reduce risk of exposure to COVID-19.  Supine today on 60% FiO2     OBJECTIVE      Medications    Current Facility-Administered Medications: insulin glargine (LANTUS) injection vial 15 Units, 15 Units, SubCUTAneous, Nightly  baricitinib (OLUMIANT) tablet 1 mg, 1 mg, Oral, Daily  heparin (porcine) injection 5,000 Units, 5,000 Units, SubCUTAneous, 3 times per day  meropenem (MERREM) 500 mg IVPB (mini-bag), 500 mg, IntraVENous, Q12H  famotidine (PEPCID) injection 10 mg, 10 mg, IntraVENous, Daily  lactulose (CHRONULAC) 10 GM/15ML solution 20 g, 20 g, Oral, TID  polyethylene glycol (GLYCOLAX) packet 17 g, 17 g, Oral, Daily  senna (SENOKOT) 8.8 MG/5ML syrup 8.8 mg, 5 mL, Oral, BID  glucose (GLUTOSE) 40 % oral gel 15 g, 15 g, Oral, PRN  dextrose 50 % IV solution, 12.5 g, IntraVENous, PRN  glucagon (rDNA) injection 1 mg, 1 mg, IntraMUSCular, PRN  dextrose 5 % solution, 100 mL/hr, IntraVENous, PRN  insulin lispro (HUMALOG) injection vial 0-12 Units, 0-12 Units, SubCUTAneous, Q4H  dexmedetomidine (PRECEDEX) 1,000 mcg in sodium chloride 0.9 % 250 mL infusion, 0.2-1.4 mcg/kg/hr, IntraVENous, Continuous  propofol injection, 5-50 mcg/kg/min, IntraVENous, Titrated  fentaNYL (SUBLIMAZE) 1,000 mcg in sodium chloride 0.9 % 100 mL infusion, 12.5-200 mcg/hr, IntraVENous, Continuous  chlorhexidine (PERIDEX) 0.12 % solution 15 mL, 15 mL, Mouth/Throat, BID  fentaNYL (SUBLIMAZE) injection 50 mcg, 50 mcg, IntraVENous, Q1H PRN  norepinephrine (LEVOPHED) 16 mg in dextrose 5% 250 mL infusion, 2-100 mcg/min, IntraVENous, Continuous  sodium chloride flush 0.9 % injection 5-40 mL, 5-40 mL, IntraVENous, 2 times per day  sodium chloride flush 0.9 % injection 5-40 mL, 5-40 mL, IntraVENous, PRN  0.9 % sodium chloride infusion, 25 mL, IntraVENous, PRN  ondansetron (ZOFRAN-ODT) disintegrating tablet 4 mg, 4 mg, Oral, Q8H PRN **OR** ondansetron (ZOFRAN) injection 4 mg, 4 mg, IntraVENous, Q6H PRN  acetaminophen (TYLENOL) tablet 650 mg, 650 mg, Oral, Q6H PRN **OR** acetaminophen (TYLENOL) suppository 650 mg, 650 mg, Rectal, Q6H PRN  Physical    VITALS:  BP (!) 143/63   Pulse 57   Temp 98.6 °F (37 °C) (Rectal)   Resp 29   Ht 5' 1\" (1.549 m)   Wt 226 lb 13.7 oz (102.9 kg)   SpO2 95%   BMI 42.86 kg/m²   Constitutional: intubated and sedated  Head: Normocephalic, atraumatic  ENT: ETT in place  Neck: neck supple, trachea midline  Lungs: non labored  Heart: RRR on tele  GI: non-distended  MSK: no edema noted     Data    CBC:   Lab Results   Component Value Date    WBC 20.4 12/19/2021    RBC 3.82 12/19/2021    HGB 12.2 12/19/2021    HCT 33.8 12/19/2021    MCV 88.6 12/19/2021    MCH 28.4 12/19/2021    MCHC 32.0 12/19/2021    RDW 14.6 12/19/2021     12/19/2021     CMP:    Lab Results   Component Value Date     12/19/2021    K 5.0 12/19/2021     12/19/2021    CO2 19 12/19/2021     12/19/2021    CREATININE 3.4 12/19/2021    CREATININE 3.12 12/19/2021    GFRAA 16 12/19/2021    LABGLOM 13 12/19/2021    GLUCOSE 113 12/19/2021    PROT 6.5 12/19/2021    LABALBU 2.5 12/19/2021    CALCIUM 9.2 12/19/2021    BILITOT 0.3 12/19/2021    ALKPHOS 146 12/19/2021    AST 19 12/19/2021    ALT 36 12/19/2021       ASSESSMENT AND PLAN      # Acute hypoxic respiratory failure 2/2 COVID-19 pneumonia, MSSA pneumonia  - completed Remdesivir, continue decadron  - increasing vent demand today - on nimbex.  Proning per pulm recs  - ABG today 7.27/70/65  - pulm consulted  - ID consulted- meropenem  - pressors as needed  - RCx with MSSA, UCx with pseudomonas - on abx    # SHLOMO- stable  - 2/2 dehydration vs hypotension  - Cr 3.12 today  - monitor UOP- improved today  - monitor with labs - replace electrolytes as needed  - nephrology consulted    # Hyperglycemia and new onset DM  - initially thought 2/2 steroids, but A1C 7.1 so pt is now diabetic  - monitor, ISS    DVT: lovenox    Disposition: Continues to be in critical condition and continues to require ICU care. Continuing to monitor UOP. Pulm, ID consulted.        Tapan Mercedes DO  Internal Medicine   Hospitalist    >35 minutes in total care time

## 2021-12-20 PROBLEM — A41.89 SEPSIS DUE TO COVID-19 (HCC): Status: ACTIVE | Noted: 2021-01-01

## 2021-12-20 NOTE — PROGRESS NOTES
Infectious Diseases Inpatient Progress Note          HISTORY OF PRESENT ILLNESS:   Patient had high fever spikes today despite being on meropenem for days  Has not been responsive to verbal or painful stimulation despite being weaned off sedation   patient remains to be intubated. Intermittent high-grade fevers and persistent leukocytosis. On assist control 80%. Has a Flexi-Seal that is currently empty  Currently on Levophed at 6.6 mics  Acute kidney injury with worsening renal function  Good urine output  Currently on IV meropenem for Pseudomonas UTI and staph aureus pneumonia  Status post baricitinib  Status post Decadron    Current Medications:     lidocaine  5 mL IntraDERmal Once    sodium chloride flush  5-40 mL IntraVENous 2 times per day    sodium zirconium cyclosilicate  10 g Oral TID    insulin glargine  8 Units SubCUTAneous Nightly    [Held by provider] baricitinib  1 mg Oral Daily    heparin (porcine)  5,000 Units SubCUTAneous 3 times per day    meropenem  500 mg IntraVENous Q12H    famotidine (PEPCID) injection  10 mg IntraVENous Daily    lactulose  20 g Oral TID    polyethylene glycol  17 g Oral Daily    senna  5 mL Oral BID    insulin lispro  0-12 Units SubCUTAneous Q4H    chlorhexidine  15 mL Mouth/Throat BID    sodium chloride flush  5-40 mL IntraVENous 2 times per day       Allergies:  Patient has no known allergies. Review of Systems  unable to provide ROS because of sedation      Physical Exam  Vitals:    12/20/21 0700 12/20/21 0705 12/20/21 0800 12/20/21 1054   BP: (!) 125/43  (!) 122/42    Pulse: 62 62 59 68   Resp: 18 18 18 (!) 34   Temp: 99.7 °F (37.6 °C)      TempSrc: Rectal      SpO2: 96% 96% 94% (S) (!) 86%   Weight:       Height:         General Appearance: Intubated and sedated, on assist control 80%. Nonresponsive to painful stimulation stimulation  Skin: warm and dry, no rash.    Head: normocephalic and atraumatic  Eyes: anicteric sclerae  ENT: Intact ET and OG  Lungs: Assisted ventilation, diminished breath sounds bilateral lung fields with fine rales  Heart normal S1-S2 no murmur  Abdomen: soft, no distention or rigidity  Bilateral leg swelling, no edema  no erythema  Clearly urine   Flexi-Seal without stools      DATA:    Lab Results   Component Value Date    WBC 19.7 (H) 12/20/2021    HGB 10.4 (L) 12/20/2021    HCT 31.9 (L) 12/20/2021    MCV 90.0 12/20/2021     (H) 12/20/2021     Lab Results   Component Value Date    CREATININE 3.1 (H) 12/20/2021     (HH) 12/20/2021     12/20/2021    K 5.4 (H) 12/20/2021     (H) 12/20/2021    CO2 20 12/20/2021       Hepatic Function Panel:  Lab Results   Component Value Date    ALKPHOS 160 12/20/2021    ALT 29 12/20/2021    AST 25 12/20/2021    PROT 5.8 12/20/2021    BILITOT 0.4 12/20/2021    LABALBU 2.2 12/20/2021   Today's labs  D-dimer= 3.8  Procalcitonin= 1.22  Sputum culture from yesterday with methicillin sensitive staph aureus  IMPRESSION:    · Critical COVID-19 pneumonia and sepsis  · bacterial pneumonia with MSSA  · Pseudomonas aeruginosa UTI  · Acute respiratory failure with severe hypoxia requiring intubation  · Hypercoagulable state  · Acute sinusitis  · Acute kidney injury    Patient Active Problem List   Diagnosis    Pneumonia due to COVID-19 virus    Acute respiratory failure with hypoxia (HCC)    [facility]co[facility]able state ([facility] [address]    Anemia    Anatomical narrow angle, bilateral    Bilateral carpal tunnel syndrome    Bilateral hearing loss    Chronic bilateral low back pain without sciatica    Floaters    Hyperopia    Lamellar macular hole of right eye    Meralgia paraesthetica, left    Murmur    Obesity, Class III, BMI 40-49.9 (morbid obesity) (HCC)    Presbyopia of both eyes    Primary osteoarthritis of both knees    Pseudophakia of both eyes    Regular astigmatism    Tinnitus of both ears    Vitreomacular traction syndrome of right eye    Elevated liver function tests    Advance care planning    Goals of care, counseling/discussion    Palliative care encounter    Acute bacterial sinusitis    Acute kidney injury ([facility] [address]    Staphylococcus aureus pneumonia ([facility] [address]    Pseudomonas urinary tract infection       PLAN:  · Change Meropenem to cefepime  · Repeat blood cultures  · follow-up CBC complete metabolic   · Vent support and weaning as tolerated  · Continue droplet plus isolation for COVID-19  · Agree with weaning off sedation and evaluation mental status  Overall poor prognosis      Harini Tolentino MD

## 2021-12-20 NOTE — PLAN OF CARE
Nutrition Problem #1: Inadequate oral intake  Intervention: Food and/or Nutrient Delivery: Modify Tube Feeding (Continue with High Protien Peptide based TF ( Vital HP) @ 20 ml/hr x24 hrs via OG. Decrease protien modulars to 1 x daily due to worsening renal labs and monitor for improvement.  Continue with water flush 100 ml, every 6 hrs)  Nutritional Goals: new goal: Pt to meet estimated energy/protein needs via EN

## 2021-12-20 NOTE — PROGRESS NOTES
Pulmonary & Critical Care Medicine ICU Progress Note  Chief complaint : Respiratory failure    Subjunctive/24 hour events :   Patient seen and examined during multidisciplinary rounds with RN, charge nurse, RT, pharmacy, dietitian, and social service. Patient on vent, currently supine, she is on fentanyl propofol and Precedex, she is on Levophed at 8 mcg/min, currently on 80% FiO2, saturation 94%, no fever, no apparent pain distress, + bowels movement,    Social History     Tobacco Use    Smoking status: Never Smoker    Smokeless tobacco: Never Used   Substance Use Topics    Alcohol use: No         Problem Relation Age of Onset    High Blood Pressure Mother        Recent Labs     12/20/21  0014 12/20/21  0703   PHART 7.258* 7.231*   [id number] 47* 52*   PO2ART 80* 110*       MV Settings:  Vent Mode: AC/VC Rate Set: 32 bmp/Vt Ordered: 370 mL/ /FiO2 : 80 %           IV:   dextrose      dexmedetomidine (PRECEDEX) IV infusion 1.4 mcg/kg/hr (12/20/21 0430)    propofol 50 mcg/kg/min (12/20/21 0610)    fentaNYL (SUBLIMAZE) infusion 200 mcg/hr (12/20/21 0403)    norepinephrine 8 mcg/min (12/20/21 0410)    sodium chloride         Vitals:  BP (!) 120/40   Pulse 62   Temp 101.8 °F (38.8 °C) (Rectal)   Resp 18   Ht 5' 1\" (1.549 m)   Wt 226 lb 13.7 oz (102.9 kg)   SpO2 96%   BMI 42.86 kg/m²    Tmax:        Intake/Output Summary (Last 24 hours) at 12/20/2021 0737  Last data filed at 12/20/2021 0545  Gross per 24 hour   Intake 2573.99 ml   Output 2350 ml   Net 223.99 ml       EXAM:  General: On vent, sedated, no distress  Head: normocephalic, atraumatic  Eyes:No gross abnormalities. ENT:  MMM no lesions, ET and OG tube in  Neck:  supple and no masses  Chest : Good air movement, bilateral rales, no wheezing, nontender, tympanic  Heart[de-identified] Heart sounds are normal.  Regular rate and rhythm without murmur, gallop or rub.   ABD:  symmetric, soft, no apparent tenderness  Musculoskeletal : no cyanosis, no clubbing and no edema  Neuro: Sedated, did not wake up or follow commands  Skin: No rashes or nodules noted. Lymph node:  no cervical nodes  Urology: Yes Kareem   Psychiatric: unresponsive, sedated    Medications:  Scheduled Meds:   insulin glargine  15 Units SubCUTAneous Nightly    baricitinib  1 mg Oral Daily    heparin (porcine)  5,000 Units SubCUTAneous 3 times per day    meropenem  500 mg IntraVENous Q12H    famotidine (PEPCID) injection  10 mg IntraVENous Daily    lactulose  20 g Oral TID    polyethylene glycol  17 g Oral Daily    senna  5 mL Oral BID    insulin lispro  0-12 Units SubCUTAneous Q4H    chlorhexidine  15 mL Mouth/Throat BID    sodium chloride flush  5-40 mL IntraVENous 2 times per day       PRN Meds:  glucose, dextrose, glucagon (rDNA), dextrose, fentanNYL, sodium chloride flush, sodium chloride, ondansetron **OR** ondansetron, acetaminophen **OR** acetaminophen    Results: reviewed by me   CBC:   Recent Labs     12/18/21  0600 12/18/21  0855 12/19/21  0600 12/19/21  0743 12/20/21  0014 12/20/21  0600 12/20/21  0703   WBC 17.6*  --  20.4*  --   --  19.7*  --    HGB 9.9*   < > 10.8*   < > 11.1* 10.1* 11.2*   HCT 31.4*  --  33.8*  --   --  31.9*  --    MCV 89.7  --  88.6  --   --  90.0  --    *  --  592*  --   --  409*  --     < > = values in this interval not displayed. BMP:   Recent Labs     12/17/21  1800 12/18/21  0004 12/18/21  0600 12/18/21  0855 12/19/21  0600 12/19/21  0743 12/20/21  0014 12/20/21  0600 12/20/21  0703     --  138  --  141  --   --  143  --    K 5.4*  --  5.1*  --  5.0*  --   --  5.4*  --      --  104  --  108*  --   --  112*  --    CO2 22  --  20  --  19*  --   --  20  --    BUN 83*  --  93*  --  112*  --   --   --   --    CREATININE 3.01*   < > 3.20*   < > 3.12*   < > 3.4* 3.36* 3.6*    < > = values in this interval not displayed.      LIVER PROFILE:   Recent Labs     12/18/21  0600 12/19/21  0600 12/20/21  0600   AST 13 19 25   ALT 31 36* 29   BILITOT 0.3 0.3 0.4   ALKPHOS 121 146* 160*     PT/INR: No results for input(s): PROTIME, INR in the last 72 hours. APTT: No results for input(s): APTT in the last 72 hours. UA:  No results for input(s): NITRITE, COLORU, PHUR, LABCAST, WBCUA, RBCUA, MUCUS, TRICHOMONAS, YEAST, BACTERIA, CLARITYU, SPECGRAV, LEUKOCYTESUR, UROBILINOGEN, BILIRUBINUR, BLOODU, GLUCOSEU, AMORPHOUS in the last 72 hours. Invalid input(s): KETONESU    Cultures:  Sputum culture shows MSSA, urine culture Pseudomonas  Films:  CXR reviewed by me and it showed lateral groundglass infiltrates      Assessment: This is a critically ill patient at risk of deterioration / death , needing close ICU monitoring and intervention due to below noted problems   · Acute hypoxic respiratory failure  · Severe COVID-19 pneumonia  · ARDS  · Septic shock  · MSSA pneumonia due to bacterial coinfection  · Pseudomonas UTI  · SHLOMO    Recommendation  · Vent support lung protective strategy  · head of the bed 30°  · Breathing trials   when lung mechanics improve  ·  sedation holiday today  · Watch for ICU delirium: TV on, natural light, avoid benzos, pain control, early mobility, and family engagement  · PUD prophylaxis  · DVT prophylaxis  · Levophed to maintain mean arterial pressure 65-70  · Watch renal function and urine output  · Appreciate nephrology  · Target blood sugar 140-180  · Decrease Lantus to 8 units  · DC baricitinib in light of persistent sepsis  · Prone position later today  · PICC line  · Patient did not wake up or follow commands will obtain CT head  · Continue tube feed  · Lokelma x3 doses today        Due to the immediate potential for life-threatening deterioration due to acute respiratory failure I spent 35  minutes providing critical care.  This time is excluding time spent performing procedures.           Electronically signed by Misael Dye MD,  [facility] ,on 12/20/2021 at 7:37 AM

## 2021-12-20 NOTE — PROGRESS NOTES
Renal Progress Note    Assessment and Plan:     67 yo lady with severe covid 19 PNA. symptots started at very end of November. Admitted on 12/9. Intubated on 12/12. Worsening kidney function now. Not on pressors. Would consider her SHLOMO to be ATN.   Has hyperkalemia     Plan/  - agree w/ lokelma  - may need to change to renal feeds altogether if renal solutes continue to worsen  - no current indication for RRT but remains low threshold       Patient Active Problem List:     Pneumonia due to COVID-19 virus     Acute respiratory failure with hypoxia (HCC)     Hypercoagulable state ([address]     Anemia     Anatomical narrow angle, bilateral     Bilateral carpal tunnel syndrome     Bilateral hearing loss     Chronic bilateral low back pain without sciatica     Floaters     Hyperopia     Lamellar macular hole of right eye     Meralgia paraesthetica, left     Murmur     Obesity, Class III, BMI 40-49.9 (morbid obesity) ([address]     Presbyopia of both eyes     Primary osteoarthritis of both knees     Pseudophakia of both eyes     Regular astigmatism     Tinnitus of both ears     Vitreomacular traction syndrome of right eye     Elevated liver function tests     Advance care planning     Goals of care, counseling/discussion     Palliative care encounter     Acute bacterial sinusitis     Acute kidney injury ([address]     Staphylococcus aureus pneumonia ([address]     Pseudomonas urinary tract infection      Subjective:   Admit Date: 12/9/2021    Interval History: remains inutbated, hypotensive, K uptrending       Medications:   Scheduled Meds:   lidocaine  5 mL IntraDERmal Once    sodium chloride flush  5-40 mL IntraVENous 2 times per day    sodium zirconium cyclosilicate  10 g Oral TID    insulin glargine  8 Units SubCUTAneous Nightly    [Held by provider] baricitinib  1 mg Oral Daily    heparin (porcine)  5,000 Units SubCUTAneous 3 times per day    meropenem  500 mg IntraVENous Q12H    famotidine (PEPCID) injection  10 mg IntraVENous Daily    lactulose  20 g Oral TID    polyethylene glycol  17 g Oral Daily    senna  5 mL Oral BID    insulin lispro  0-12 Units SubCUTAneous Q4H    chlorhexidine  15 mL Mouth/Throat BID    sodium chloride flush  5-40 mL IntraVENous 2 times per day     Continuous Infusions:   sodium chloride      sodium chloride      dextrose      dexmedetomidine (PRECEDEX) IV infusion 1.4 mcg/kg/hr (12/20/21 0430)    propofol 50 mcg/kg/min (12/20/21 1004)    fentaNYL (SUBLIMAZE) infusion 200 mcg/hr (12/20/21 1004)    norepinephrine 8 mcg/min (12/20/21 0410)    sodium chloride         CBC:   Recent Labs     12/19/21  0600 12/19/21  0743 12/20/21  0600 12/20/21  0703   WBC 20.4*  --  19.7*  --    HGB 10.8*   < > 10.1* 11.2*   *  --  409*  --     < > = values in this interval not displayed. CMP:    Recent Labs     12/18/21  0600 12/18/21  0855 12/19/21  0600 12/19/21  0743 12/20/21  0014 12/20/21  0600 12/20/21  0703     --  141  --   --  143  --    K 5.1*  --  5.0*  --   --  5.4*  --      --  108*  --   --  112*  --    CO2 20  --  19*  --   --  20  --    BUN 93*  --  112*  --   --  116*  --    CREATININE 3.20*   < > 3.12*   < > 3.4* 3.36* 3.6*   GLUCOSE 186*  --  113*  --   --  104*  --    CALCIUM 8.7  --  9.2  --   --  9.0  --    LABGLOM 14.2*   < > 14.7*   < > 13* 13.5* 12*    < > = values in this interval not displayed. Troponin: No results for input(s): TROPONINI in the last 72 hours. BNP: No results for input(s): BNP in the last 72 hours. INR: No results for input(s): INR in the last 72 hours. Lipids: No results for input(s): CHOL, LDLDIRECT, TRIG, HDL, AMYLASE, LIPASE in the last 72 hours. Liver:   Recent Labs     12/20/21  0600   AST 25   ALT 29   ALKPHOS 160*   PROT 5.8*   LABALBU 2.2*   BILITOT 0.4     Iron:    No results for input(s): IRONS, FERRITIN in the last 72 hours.     Invalid input(s): LABIRONS  Urinalysis: No results for input(s): UA in the last 72 hours.    Objective:   Vitals: BP (!) 122/42   Pulse 68   Temp 99.7 °F (37.6 °C) (Rectal)   Resp (!) 34   Ht 5' 1\" (1.549 m)   Wt 226 lb 13.7 oz (102.9 kg)   SpO2 (S) (!) 86%   BMI 42.86 kg/m²    Wt Readings from Last 3 Encounters:   12/18/21 226 lb 13.7 oz (102.9 kg)   12/09/21 200 lb (90.7 kg)   12/13/11 237 lb (107.5 kg)      24HR INTAKE/OUTPUT:      Intake/Output Summary (Last 24 hours) at 12/20/2021 1155  Last data filed at 12/20/2021 0800  Gross per 24 hour   Intake 2673.99 ml   Output 2350 ml   Net 323.99 ml       General: intubated, sedated  HEENT: normocephalic, atraumatic, ETT in place  Lungs: intubated, on vent, coarse breath sounds  Heart: regular rate and rhythm, no murmurs or rubs  Abdomen: soft, non-tender, non-distended  Ext: no cyanosis, no peripheral edema  Neuro: sedated      Electronically signed by Loretta Dunne MD on 12/20/2021 at 11:55 AM

## 2021-12-20 NOTE — PROGRESS NOTES
[facility]      Patient Identification:  NAME:  Cherelle Ayala  Age:  57 y.o.   Sex:  female   :  1960   MRN:  [id number]       Chief complaint:  Urgent urination    History of present illness:      56 yo female with 24 hour h/o progressively worsening urgency/frequency of urination.  Gradually worsening, moderate severity.  Assoc with nausea and mild right inguinal pain.  24-48 hours ago also had left flank pain similar in nature to past kidney stone events, intermittent, moderate severity.  Improved with meds in ER.  No fever, no GH.    She had an episode of sepsis and obstruction last year - sees Dr. Rashaun Hernandez normally, had appt next week    In ER:  -UA POS nitrite, 13-20 WBCs, 4+ bact, 0-2 RBCs  -WBC 7 K  -Creat 0.8  -CT (personally viewed):  6 mm left UVJ stone with mild asm-nephric stranding, multiple small renal stones, moderate left renal atrophy compared to right side    Not toxic, no fever  Moderate irritative voiding symptoms    Admitted to hospital for left ureteral stone + UTI  To OR for left stent placement    Past medical history:  Past Medical History:   Diagnosis Date   • Arthritis    • History of anemia    • History of small bowel obstruction     D/T ADHESIONS, HAD MULTIPLE SURGERIES (5)   • History of transfusion    • History of ulcer disease     WITH GI BLEED AND TRANSFUSION   • Kidney stone    • Low back pain    • Mitral valve prolapse    • Renal insufficiency    • Tachycardia     FAMILY MD MANAGES, PT DENIES AFIB/FLUTTER HX   • Tinnitus of both ears    • Uses contact lenses    • UTI (urinary tract infection) 2017    FINISHED ALL ABX, BACTRIM 17       Past surgical history:  Past Surgical History:   Procedure Laterality Date   • APPENDECTOMY     • CERVICAL FUSION     • CHOLECYSTECTOMY     • CYSTOSCOPY URETEROSCOPY STONE MANIPULATION/EXTRACTION      X3   • HYSTERECTOMY     • LYSIS OF ABDOMINAL ADHESIONS      MULTIPLE   • ULNAR NERVE TRANSPOSITION Left    •  TIDAL VOLUME LOWERED  PER PROTOCOL TO ACHIEVE PLAT PRESSURE 30 OR LESS. URETEROSCOPY LASER LITHOTRIPSY WITH STENT INSERTION Left 3/1/2017    Procedure: LT URETEROSCOPY LASER LITHOTRIPSY WITH STENT INSERTION AND STONE BASKET EXTRACTION BILATERAL RETROGRADE PYLOGRAM LASER LITHROTRIPSY;  Surgeon: Arnol Hernandez MD;  Location: [facility]IN OR;  Service:        Allergies:  Codeine; Ambien [zolpidem]; Benadryl [diphenhydramine]; Nsaids; Other; Levaquin [levofloxacin]; Morphine and related; Penicillins; Phenergan [promethazine hcl]; Reglan [metoclopramide]; and Ultram [tramadol]    Home medications:    (Not in a hospital admission)    Hospital medications:        •  sodium chloride  •  Insert peripheral IV **AND** sodium chloride    Family history:  History reviewed. No pertinent family history.    Social history:  Social History   Substance Use Topics   • Smoking status: Never Smoker   • Smokeless tobacco: Not on file   • Alcohol use No       Review of systems:    Negative 12-system ROS except for the following:  No chest pain, cough, vomiting, o/w neg      Objective:  TMax 24 hours:   Temp (24hrs), Av.8 °F (36.6 °C), Min:97.8 °F (36.6 °C), Max:97.8 °F (36.6 °C)      Vitals Ranges:   Temp:  [97.8 °F (36.6 °C)] 97.8 °F (36.6 °C)  Heart Rate:  [91-96] 91  Resp:  [14-16] 15  BP: (122-146)/(67-77) 146/77    Intake/Output Last 3 shifts:  I/O last 3 completed shifts:  In: 50 [IV Piggyback:50]  Out: -      Physical Exam:    General Appearance:    Alert, cooperative, in no acute distress, not toxic   Head:    Normocephalic, without obvious abnormality, atraumatic   Ears:    Normal external inspection   Throat:   No oral lesions, oral mucosa moist   Neck:   Supple, no LAD, trachea midline   Back:     POS left CVA tenderness   Lungs:     Respirations unlabored, symmetric excursion    Heart:    RRR, intact peripheral pulses   Abdomen:     Soft, ND, mild LLQ tenderness, no masses, no guarding   :    No pelvic examination performed   Extremities:   No edema, no deformity   Skin:   No bleeding,  bruising or rashes   Neuro/Psych:   Orientation intact, mood/affect pleasant, no focal findings       Results review:   I reviewed the patient's new clinical results.    Data review:  Lab Results (last 24 hours)     Procedure Component Value Units Date/Time    [place] Draw [[id number]] Collected:  04/18/18 1518    Specimen:  Blood Updated:  04/18/18 1630    Narrative:       The following orders were created for panel order [place] Draw.  Procedure                               Abnormality         Status                     ---------                               -----------         ------                     Light Blue Top[[id number]]                                   Final result               Green Top (Gel)[[id number]]                                  Final result               Lavender Top[[id number]]                                     Final result               Gold Top - SST[[id number]]                                   Final result                 Please view results for these tests on the individual orders.    Light Blue Top [[id number]] Collected:  04/18/18 1518    Specimen:  Blood Updated:  04/18/18 1630     Extra Tube hold for add-on     Comment: Auto resulted       Green Top (Gel) [[id number]] Collected:  04/18/18 1518    Specimen:  Blood Updated:  04/18/18 1630     Extra Tube Hold for add-ons.     Comment: Auto resulted.       Lavender Top [[id number]] Collected:  04/18/18 1518    Specimen:  Blood Updated:  04/18/18 1630     Extra Tube hold for add-on     Comment: Auto resulted       Gold Top - SST [[id number]] Collected:  04/18/18 1518    Specimen:  Blood Updated:  04/18/18 1630     Extra Tube Hold for add-ons.     Comment: Auto resulted.       Comprehensive Metabolic Panel [[id number]] Collected:  04/18/18 1518    Specimen:  Blood Updated:  04/18/18 1555     Glucose 88 mg/dL      BUN 15 mg/dL      Creatinine 0.80 mg/dL      Sodium 145 mmol/L      Potassium 3.9 mmol/L      Chloride 107 mmol/L      CO2 23.1 mmol/L       Calcium 9.4 mg/dL      Total Protein 7.1 g/dL      Albumin 4.00 g/dL      ALT (SGPT) 12 U/L      AST (SGOT) 13 U/L      Alkaline Phosphatase 88 U/L      Total Bilirubin 0.2 mg/dL      eGFR  Day Raymond 89 mL/min/1.73      Globulin 3.1 gm/dL      A/G Ratio 1.3 g/dL      BUN/Creatinine Ratio 18.8     Anion Gap 14.9 mmol/L     Lipase [[id number]]  (Normal) Collected:  04/18/18 1518    Specimen:  Blood Updated:  04/18/18 1555     Lipase 39 U/L     Urinalysis With / Culture If Indicated - Urine, Clean Catch [[id number]]  (Abnormal) Collected:  04/18/18 1518    Specimen:  Urine from Urine, Clean Catch Updated:  04/18/18 1536     Color, UA Yellow     Appearance, UA Clear     pH, UA <=5.0     Specific Gravity, UA 1.021     Glucose, UA Negative     Ketones, UA Negative     Bilirubin, UA Negative     Blood, UA Negative     Protein, UA Negative     Leuk Esterase, UA Trace (A)     Nitrite, UA Positive (A)     Urobilinogen, UA 0.2 E.U./dL    Urinalysis, Microscopic Only - Urine, Clean Catch [[id number]]  (Abnormal) Collected:  04/18/18 1518    Specimen:  Urine from Urine, Clean Catch Updated:  04/18/18 1536     RBC, UA 0-2 /HPF      WBC, UA 13-20 (A) /HPF      Bacteria, UA 4+ (A) /HPF      Squamous Epithelial Cells, UA 0-2 /HPF      Hyaline Casts, UA 7-12 /LPF      Methodology Automated Microscopy    Urine Culture - Urine, Urine, Clean Catch [[id number]] Collected:  04/18/18 1518    Specimen:  Urine from Urine, Clean Catch Updated:  04/18/18 1531    CBC & Differential [[id number]] Collected:  04/18/18 1518    Specimen:  Blood Updated:  04/18/18 1530    Narrative:       The following orders were created for panel order CBC & Differential.  Procedure                               Abnormality         Status                     ---------                               -----------         ------                     CBC Auto Differential[[id number]]        Abnormal            Final result                 Please view results for these  tests on the individual orders.    CBC Auto Differential [[id number]]  (Abnormal) Collected:  04/18/18 1518    Specimen:  Blood Updated:  04/18/18 1530     WBC 7.38 10*3/mm3      RBC 4.65 10*6/mm3      Hemoglobin 11.5 (L) g/dL      Hematocrit 38.3 %      MCV 82.4 fL      MCH 24.7 (L) pg      MCHC 30.0 (L) g/dL      RDW 15.2 (H) %      RDW-SD 45.4 fl      MPV 10.1 fL      Platelets 289 10*3/mm3      Neutrophil % 55.4 %      Lymphocyte % 37.8 %      Monocyte % 4.9 (L) %      Eosinophil % 1.5 %      Basophil % 0.4 %      Immature Grans % 0.0 %      Neutrophils, Absolute 4.09 10*3/mm3      Lymphocytes, Absolute 2.79 10*3/mm3      Monocytes, Absolute 0.36 10*3/mm3      Eosinophils, Absolute 0.11 10*3/mm3      Basophils, Absolute 0.03 10*3/mm3      Immature Grans, Absolute 0.00 10*3/mm3            Imaging:  Imaging Results (last 24 hours)     Procedure Component Value Units Date/Time    CT Abdomen Pelvis Without Contrast [[id number]] Collected:  04/18/18 1817     Updated:  04/18/18 1817    Narrative:       CT OF THE ABDOMEN AND PELVIS WITHOUT CONTRAST 4/18/2018      HISTORY: Suprapubic pain.     TECHNIQUE: Axial images were obtained from the lung bases to the  symphysis pubis. No intravenous or oral contrast was given.     FINDINGS: There are numerous tiny stones in the bilateral kidneys. There  is mild dilatation of the left ureter down to the left ureterovesical  junction where there is an approximately 6 mm stone on image 125.     Gallbladder has been removed. The liver, spleen, pancreas and adrenals  appear unremarkable except for 2 small liver lesions also seen on the  previous study of 8/26/2014 and likely tiny hepatic cysts. There is some  mild haziness of the retroperitoneal fat at the level of the upper pole  left kidney and posterior to the pancreas. This finding is also seen on  the previous study of 8/26/2014.     Ventral hernia containing fat is seen.     No bowel wall thickening or bowel dilatation is seen.        Impression:       1. Mild obstructive uropathy on the left from a 6 mm stone at the left  ureterovesical junction.  2. Numerous tiny stones are seen in both kidneys.  3. There is heterogeneous stranding of the retroperitoneal fat posterior  to the pancreas at the level of the upper pole left kidney and this  finding is nonspecific and is also seen on the 08/26/2014 study.   4. Status post cholecystectomy.     Radiation dose reduction techniques were utilized, including automated  exposure control and exposure modulation based on body size.                   Assessment:     Active Problems:    * No active hospital problems. *    Left ureteral stone  Mild left renal atrophy  Bilateral renal stones  UTI    Plan:     Admit to hospital for IV antibiotics, urgent left stent  Stable now, potentially unstable situation  IV Rocephin given  RBO explained - to OR for left stent placement - will need stone surgery later - staged procedure  Home when afebrile  ER MD confirms urine culture was sent and is pending    Arnol Dewey MD  04/18/18  7:50 PM

## 2021-12-20 NOTE — PROGRESS NOTES
Comprehensive Nutrition Assessment    Type and Reason for Visit:  Reassess    Nutrition Recommendations/Plan:   Continue with High Protien Peptide based TF ( Vital HP) @ 20 ml/hr x24 hrs via OG. Decrease protien modulars to 1 x daily due to worsening renal labs and monitor for improvement. Continue with water flush 100 ml, every 6 hrs  Monitor for changes in propofol rate/proning schedule for adjustments to EN goal rate    Nutrition Assessment:  Nutritional status is adequate at this time, remains at risk for malnutrition related to acute illness ( COVID with intubation). Has been receiving EN within range of estimated needs since 12/12. Worsening renal labs noted, no plan for dialysis per nephrology. Current En delivers ~ 670 mg potassium daily. Renal TF not indicated at this time, due to high fat/high caloric content and pt's current rate of propfol. Will decrease protiein modulars to 1 x daily and see if BUN/creat improve    Malnutrition Assessment:  Malnutrition Status: At risk for malnutrition (Comment)    Context:  Acute Illness     Findings of the 6 clinical characteristics of malnutrition:  Energy Intake:  Mild decrease in energy intake (Comment)  Weight Loss:  Unable to assess     Body Fat Loss:  Unable to assess     Muscle Mass Loss:  Unable to assess    Fluid Accumulation:  1 - Mild Generalized   Strength:  Not Performed    Estimated Daily Nutrient Needs:  Energy (kcal):  1027-5296 (kg x 11-14); Weight Used for Energy Requirements:  Current (94.2 kg)     Protein (g):  95 gm (kg IBW x 2.0); Weight Used for Protein Requirements:  Ideal (47.7 kg)        Fluid (ml/day):  ~1200 ml (or as per MD);  Method Used for Fluid Requirements:  1 ml/kcal      Nutrition Related Findings:  intubated (12/11) trophic TF + px started and provided kcal/protein within range of estimated needs, BUN 68-93, creat 2.5-3.2 K = 5.4, ( lokelma ordered )GLuco 139-200 ( new Dx of DM noted with HgbAic = 7.1), + loose stools via FMS ( glycolax and senna-s), Current propofol @ 28.2 ( ~ 750 kcals) + precedex, fentanyl, levophed @ 75. Remains on proning schedule, 1+ generalized edema per nursing, has residuals ~ 300  ml, yesterday      Wounds:  None       Current Nutrition Therapies:    Current Tube Feeding (TF) Orders:  · Feeding Route: Orogastric  · Formula: Peptide Based High Protein  · Schedule: Continuous @ 20 ml/hr  · Additives/Modulars: Protein (x 2 = 208 kcal, 52 gm protein)  · Water Flushes: 100 ml every 6 hrs (400 ml)  · Current TF & Flush Orders Provides: 688 kcals ( + propofol), 94 g protein, ~ 800 ml free water, ~ 670 mg K+  · Goal TF & Flush Orders Provides: 688 kcals ( + propofol), 68 g protein, ~ 800 ml free water, ~ 670 mg K+      Anthropometric Measures:  · Height: 5' 1\" (154.9 cm)  · Current Body Weight: 226 lb (102.5 kg) ((12/18) * edema present)   · Admission Body Weight: 207 lb (93.9 kg)    · Usual Body Weight:  (N/A)     · Ideal Body Weight: 105 lbs;   · BMI: 42.7  · BMI Categories: Obese Class 2 (BMI 35.0 -39.9)       Nutrition Diagnosis:   · Inadequate oral intake related to impaired respiratory function as evidenced by intubation    · Altered nutrition-related lab values related to renal dysfunction as evidenced by lab values      Nutrition Interventions:   Food and/or Nutrient Delivery:  Modify Tube Feeding (Continue with High Protien Peptide based TF ( Vital HP) @ 20 ml/hr x24 hrs via OG. Decrease protien modulars to 1 x daily due to worsening renal labs and monitor for improvement.  Continue with water flush 100 ml, every 6 hrs)  Nutrition Education/Counseling:  Education not indicated   Coordination of Nutrition Care:  Continue to monitor while inpatient    Goals:  new goal: Pt to meet estimated energy/protein needs via EN       Nutrition Monitoring and Evaluation:      Food/Nutrient Intake Outcomes:  Enteral Nutrition Intake/Tolerance  Physical Signs/Symptoms Outcomes:  Biochemical Data,GI Status,Hemodynamic Status,Weight     Discharge Planning:      too soon to determine    Electronically signed by Verna Wilcox RD, LD on 12/20/21 at 11:28 AM EST

## 2021-12-20 NOTE — PROGRESS NOTES
From: Home-     PMH: Shingles, headaches, ANEMIA    Anticipated Discharge Date: TBD    Anticipated Discharge Disposition: TBD    Patient Mobility or PT/OT ordered: NA - On vent. CONS ID, PULM, PALL CARE,RENAL    Covid Test Date and Result: 11/29 Home COVID test - positive. 12/11/21 INTUBATED.      Barriers to Discharge: VENT INCREASED FIO2 60%>80%,  PRONING, MULTIPLE GTTS, COVID MEDS, CREAT-0.94--2.51--3.36;  PALL CARE SPOKE WITH SISTER FORREST BARRAZA RE PROGNOSIS

## 2021-12-20 NOTE — PROGRESS NOTES
Hospitalist Progress Note      PCP: No primary care provider on file. Date of Admission: 12/9/2021    Chief Complaint:    No chief complaint on file. Subjective:  Patient is intubated; unable to complete full 12 point ROS    Medications:  Reviewed    Infusion Medications    sodium chloride      sodium chloride      sodium chloride      dextrose      dexmedetomidine (PRECEDEX) IV infusion 1.4 mcg/kg/hr (12/20/21 1310)    propofol 50 mcg/kg/min (12/20/21 1310)    fentaNYL (SUBLIMAZE) infusion 200 mcg/hr (12/20/21 1004)    norepinephrine 8 mcg/min (12/20/21 0410)    sodium chloride       Scheduled Medications    lidocaine  5 mL IntraDERmal Once    sodium chloride flush  5-40 mL IntraVENous 2 times per day    sodium zirconium cyclosilicate  10 g Oral TID    insulin glargine  8 Units SubCUTAneous Nightly    [Held by provider] baricitinib  1 mg Oral Daily    heparin (porcine)  5,000 Units SubCUTAneous 3 times per day    meropenem  500 mg IntraVENous Q12H    famotidine (PEPCID) injection  10 mg IntraVENous Daily    lactulose  20 g Oral TID    polyethylene glycol  17 g Oral Daily    senna  5 mL Oral BID    insulin lispro  0-12 Units SubCUTAneous Q4H    chlorhexidine  15 mL Mouth/Throat BID    sodium chloride flush  5-40 mL IntraVENous 2 times per day     PRN Meds: sodium chloride flush, sodium chloride, glucose, dextrose, glucagon (rDNA), dextrose, fentanNYL, sodium chloride flush, sodium chloride, ondansetron **OR** ondansetron, acetaminophen **OR** acetaminophen      Intake/Output Summary (Last 24 hours) at 12/20/2021 1401  Last data filed at 12/20/2021 0800  Gross per 24 hour   Intake 2673.99 ml   Output 2350 ml   Net 323.99 ml       Exam:    BP (!) 122/42   Pulse 68   Temp 99.7 °F (37.6 °C) (Rectal)   Resp (!) 34   Ht 5' 1\" (1.549 m)   Wt 226 lb 13.7 oz (102.9 kg)   SpO2 (S) (!) 86%   BMI 42.86 kg/m²     General appearance: Intubated  HEENT: Conjunctivae/corneas clear.   Neck: Trachea midline. Respiratory:  Normal respiratory effort. Clear to auscultation  Cardiovascular: Regular rate and rhythm   Abdomen: Soft, non-tender, non-distended with normal bowel sounds. Musculoskeletal: No clubbing, cyanosis or edema bilaterally. Neuro: Sedated  Capillary Refill: Brisk,< 3 seconds   Peripheral Pulses: +2 palpable, equal bilaterally     Labs:   Recent Labs     12/18/21 0600 12/18/21  0855 12/19/21 0600 12/19/21  0743 12/20/21  0014 12/20/21 0600 12/20/21  0703   WBC 17.6*  --  20.4*  --   --  19.7*  --    HGB 9.9*   < > 10.8*   < > 11.1* 10.1* 11.2*   HCT 31.4*  --  33.8*  --   --  31.9*  --    *  --  592*  --   --  409*  --     < > = values in this interval not displayed. Recent Labs     12/18/21 0600 12/18/21  0855 12/19/21 0600 12/19/21  0743 12/20/21  0014 12/20/21 0600 12/20/21  0703     --  141  --   --  143  --    K 5.1*  --  5.0*  --   --  5.4*  --      --  108*  --   --  112*  --    CO2 20  --  19*  --   --  20  --    BUN 93*  --  112*  --   --  116*  --    CREATININE 3.20*   < > 3.12*   < > 3.4* 3.36* 3.6*   CALCIUM 8.7  --  9.2  --   --  9.0  --     < > = values in this interval not displayed. Recent Labs     12/18/21 0600 12/19/21 0600 12/20/21  0600   AST 13 19 25   ALT 31 36* 29   BILITOT 0.3 0.3 0.4   ALKPHOS 121 146* 160*     No results for input(s): INR in the last 72 hours. No results for input(s): Alexandra Nerstrand in the last 72 hours. Urinalysis:      Lab Results   Component Value Date    NITRU Negative 12/15/2021    WBCUA 20-50 12/15/2021    BACTERIA Negative 12/15/2021    RBCUA  12/15/2021    BLOODU MODERATE 12/15/2021    SPECGRAV 1.019 12/15/2021    GLUCOSEU Negative 12/15/2021       Radiology:  US ABDOMEN LIMITED   Final Result      CHOLELITHIASIS WITHIN A MILDLY DISTENDED GALLBLADDER. NO BILIARY DILATATION OR OTHER FINDINGS OF ACUTE CHOLECYSTITIS, WITHIN THE LIMITS OF THE STUDY.                XR CHEST PORTABLE   Final Result   Status post central line placement. There are bilateral alveolar opacities , infiltrates worrisome for viral COVID-19 pneumonia. XR CHEST PORTABLE   Final Result   There are bilateral alveolar opacities , infiltrates worrisome for viral COVID-19 pneumonia. XR CHEST PORTABLE   Final Result   Status post intubation. There are bilateral alveolar opacities , infiltrates worrisome for viral COVID-19 pneumonia. CTA CHEST W WO CONTRAST   Final Result      No CT evidence for pulmonary embolus. Ground glass opacities, associated with COVID 19 pneumonia among multiple other etiologies. IR PICC WO SQ PORT/PUMP > 5 YEARS    (Results Pending)     Assessment/Plan:    # Acute hypoxic and hypercapnic respiratory failure 2/2 COVID-19 pneumonia, MSSA pneumonia with septic shock  - completed Remdesivir, continue decadron (day 8)  -  Proning per pulm recs  - ID consulted- continue meropenem  - pressors as needed  - RCx with MSSA, UCx with pseudomonas - on abx     # SHLOMO-     - likely secondary to ATN; nephrology is now following and assisting with management     # Hyperglycemia and new onset DM  - SSI     DVT: lovenox     Disposition: Continues to be in critical condition and continues to require ICU care. Continuing to monitor UOP.  Pulm, ID consulted.          Active Hospital Problems    Diagnosis Date Noted    Staphylococcus aureus pneumonia ([address] [J15.211]     Pseudomonas urinary tract infection [N39.0, B96.5]     Acute kidney injury ([address] [N17.9]     Acute bacterial sinusitis [J01.90, B96.89]     Elevated liver function tests [R79.89]     Advance care planning [Z71.89]     Goals of care, counseling/discussion [Z71.89]     Palliative care encounter [Z51.5]     Acute respiratory failure with hypoxia ([address] [J96.01]     Hypercoagulable state ([address] [D68.59]     Pneumonia due to COVID-19 virus [U07.1, J12.82] 12/09/2021            Additional work up or/and treatment plan may be added today or then after based on clinical progression. I am managing a portion of pt care. Some medical issues are handled by other specialists. Additional work up and treatment should be done in out pt setting by pt PCP and other out pt providers. In addition to examining and evaluating pt, I spent additional time explaining care, normal and abnormal findings, and treatment plan. All of pt questions were answered. Counseling, diet and education were  provided. Case will be discussed with nursing staff when appropriate. Family will be updated if and when appropriate.       Diet: Diet NPO  ADULT TUBE FEEDING; Orogastric; Peptide Based High Protein; Continuous; 20; No; 100; Q 6 hours; Protein; add 1 proteinex with water flush daily    Code Status: Full Code    PT/OT Eval           Electronically signed by Rosalia Beaulieu MD on 12/20/2021 at 2:01 PM

## 2021-12-20 NOTE — PROCEDURES
PROCEDURE:   Radial artery line placement. (A-line)  [id number]    INDICATION: Blood Pressure monitoring       CONSENT: The family members were counseled regarding the procedure, it's indications, risks, potential complications and alternatives and any questions were answered. Consent was obtained. nt. PROCEDURE SUMMARY:   Radial arteries were assessed by palpation and ultrasound localization. Jyotsna Finer test was done to asses collateral circulation. The patient was prepped and draped in the usual sterile manner using chlorhexidine scrub. 1% lidocaine was used to numb the region. The right  radial artery was palpated and successfully cannulated on the first pass. Pulsatile, arterial blood was visualized and the artery was then threaded using the Seldinger technique and a catheter was then sutured into place. Good wave-form was obtained. The patient tolerated the procedure well without any immediate complications. The area was cleaned and Tegaderm was applied.      ESTIMATED BLOOD LOSS:   Minimal    COMPLICATIONS:  No immediate complication     Ema Loera, APRN - CNP

## 2021-12-21 NOTE — PROGRESS NOTES
Infectious Diseases Inpatient Progress Note          HISTORY OF PRESENT ILLNESS:   Patient has decreased fevers since she was switched from meropenem to cefepime yesterday. Currently in a prone position. Sedated on propofol fentanyl. On assist control 90%. Has difficulty weaning of the ventilator. Currently very acidotic with pH of 7  Currently on Levophed at 10 mics  Acute kidney injury with worsening renal function  Good urine output  Currently on IV cefepime for Pseudomonas UTI and staph aureus pneumonia  Status post baricitinib  Status post Decadron    Current Medications:     sodium chloride flush  5-40 mL IntraVENous 2 times per day    insulin glargine  8 Units SubCUTAneous Nightly    cefepime  1,000 mg IntraVENous Q24H    [Held by provider] baricitinib  1 mg Oral Daily    heparin (porcine)  5,000 Units SubCUTAneous 3 times per day    famotidine (PEPCID) injection  10 mg IntraVENous Daily    polyethylene glycol  17 g Oral Daily    senna  5 mL Oral BID    insulin lispro  0-12 Units SubCUTAneous Q4H    chlorhexidine  15 mL Mouth/Throat BID    sodium chloride flush  5-40 mL IntraVENous 2 times per day       Allergies:  Patient has no known allergies. Review of Systems  unable to provide ROS because of sedation      Physical Exam  Vitals:    12/21/21 1312 12/21/21 1313 12/21/21 1314 12/21/21 1315   BP:       Pulse: 64 64 64 64   Resp: 29 29 30 30   Temp:       TempSrc:       SpO2: 97% 97% 97% 97%   Weight:       Height:         General Appearance: Intubated and sedated, on assist control 90%. Currently in a prone position  Nonresponsive to painful stimulation stimulation  Skin: warm and dry, no rash.    Head: normocephalic and atraumatic  Eyes: anicteric sclerae  ENT: Intact ET and OG  Lungs: Assisted ventilation, diminished breath sounds bilateral lung fields with fine rales  Heart regular rhythm  Abdomen: soft, no distention or rigidity  Bilateral leg swelling, no edema  no erythema  Clear Pseudomonas urinary tract infection       PLAN:  · IV cefepime  · F/U Repeat blood cultures, collected yesterday  · follow-up CBC complete metabolic   · Vent support and weaning as tolerated  · Continue droplet plus isolation for COVID-19  · Follow-up with nephrology for acute kidney injury  · May consider terminal wean  Overall poor prognosis      Naina Jane MD

## 2021-12-21 NOTE — PROGRESS NOTES
Palliative Care Progress Note  Patient: Autumn De Jesus  Gender: female  YOB: 1950  Unit/Bed: IC10/IC10-01  Code Status: Full Code  Inpatient Treatment Team: Treatment Team: Attending Provider: Lucy Saldana MD; Consulting Physician: Eva Bowling MD; Utilization Reviewer: Chinmay Robles, RN; Consulting Physician: Stefany Alves MD; Utilization Reviewer: Mya Ryan, LINDA; Consulting Physician: Antoinette Perez MD; Registered Nurse: Jose Colindres, LINDA; Unit Clerk: Lizette Luna; Registered Nurse: Amberly Bass RN  Admit Date:  12/9/2021    Chief Complaint: Shortness of breath    History of Presenting Illness:      Autumn De Jesus is a 66 y.o. female on hospital day 12 with a history of anemia, carpal tunnel, heart murmur, OA, and hearing loss. Patient presented to the ER from home with shortness of breath. She started feeling sick 10 days ago with headache, body aches, and fatigue. Patient also reported poor appetite, intermittent chills, fever, and cough. Currently not vaccinated. Patient was admitted to a regular medical floor with COVID-19 pneumonia. She later was transferred to the ICU and intubated. Patient remains sedated and intubated with FiO2 of 70% PEEP of 14. Patient is currently unresponsive. HPI limited due to unresponsive. Spoke with sister, Glen Mayo. Glen Mayo reports that patient's HPOA is her sister Lisandro Ramos, however, Lisandro Ramos is currently awaiting an ICU bed as well at [facility].      Most recent notable labs: ABGs: pH 7.303, PCO2 50, PO2 59, O2 Sat 87, Creat. 1.99, GFR 24.6, albumin 2.6, TB 3.1, Alk Phos. 147, , .    12/15/21:  Patient was proned and back to supine. Vent support was decreased until placed supine again. She remains on sedation and unresponsive. When proned, FiO2 was at 50% and PEEP at 10. Most recent notable labs: pO2, arterial 69, O2 Sat, Arterial 91, creat.  1.19, GFR 44.6, BUN 45, albumin 2.6, AST 64, WBC Genitourinary:     Comments: Serna with clear yellow urine. Musculoskeletal:      Right lower leg: No edema. Left lower leg: No edema. Skin:     General: Skin is warm and dry. Neurological:      Mental Status: She is unresponsive. Comments: Sedated. Psychiatric:         Speech: She is noncommunicative. Cognition and Memory: Cognition is impaired.          Allergies:      No Known Allergies    Medications:      Current Facility-Administered Medications   Medication Dose Route Frequency Provider Last Rate Last Admin    0.45 % sodium chloride infusion   IntraVENous Continuous Moises Longo MD 50 mL/hr at 12/21/21 0940 New Bag at 12/21/21 0940    sodium chloride flush 0.9 % injection 5-40 mL  5-40 mL IntraVENous 2 times per day Anell Mortimer, APRN - CNP   10 mL at 12/20/21 2100    sodium chloride flush 0.9 % injection 5-40 mL  5-40 mL IntraVENous PRN Anell Mortimer, APRN - CNP        0.9 % sodium chloride infusion  25 mL IntraVENous PRN Anell Mortimer, APRN - CNP        insulin glargine (LANTUS) injection vial 8 Units  8 Units SubCUTAneous Nightly Moises Longo MD   8 Units at 12/20/21 2241    cefepime (MAXIPIME) 1000 mg IVPB minibag  1,000 mg IntraVENous Q24H Sara Burk MD   Stopped at 12/20/21 1853    [Held by provider] baricitinib (OLUMIANT) tablet 1 mg  1 mg Oral Daily Moises Longo MD   1 mg at 12/20/21 8463    heparin (porcine) injection 5,000 Units  5,000 Units SubCUTAneous 3 times per day John Morrison MD   5,000 Units at 12/21/21 0540    famotidine (PEPCID) injection 10 mg  10 mg IntraVENous Daily [facility] DO   10 mg at 12/21/21 0824    polyethylene glycol (GLYCOLAX) packet 17 g  17 g Oral Daily Anell Mortimer, APRN - CNP   17 g at 12/20/21 0823    senna (SENOKOT) 8.8 MG/5ML syrup 8.8 mg  5 mL Oral BID Anell Mortimer, APRN - CNP   8.8 mg at 12/20/21 2130    glucose (GLUTOSE) 40 % oral gel 15 g  15 g Oral PRN Jaky Garcia MD        dextrose 50 % IV solution  12.5 g IntraVENous PRN Elbert Delarosa MD        glucagon (rDNA) injection 1 mg  1 mg IntraMUSCular PRN Elbert Delarosa MD        dextrose 5 % solution  100 mL/hr IntraVENous PRN Elbert Delarosa MD        insulin lispro (HUMALOG) injection vial 0-12 Units  0-12 Units SubCUTAneous Q4H Elbert Delarosa MD   2 Units at 12/21/21 0145    dexmedetomidine (PRECEDEX) 1,000 mcg in sodium chloride 0.9 % 250 mL infusion  0.2-1.4 mcg/kg/hr IntraVENous Continuous Teagan Guerrero MD 32.97 mL/hr at 12/21/21 0831 1.4 mcg/kg/hr at 12/21/21 0831    propofol injection  5-50 mcg/kg/min IntraVENous Titrated Teagan Guerrero MD 28.3 mL/hr at 12/21/21 0939 50 mcg/kg/min at 12/21/21 0939    fentaNYL (SUBLIMAZE) 1,000 mcg in sodium chloride 0.9 % 100 mL infusion  12.5-200 mcg/hr IntraVENous Continuous Teagan Guerrero MD 20 mL/hr at 12/21/21 0818 200 mcg/hr at 12/21/21 0818    chlorhexidine (PERIDEX) 0.12 % solution 15 mL  15 mL Mouth/Throat BID Elbert Delarosa MD   15 mL at 12/21/21 0824    fentaNYL (SUBLIMAZE) injection 50 mcg  50 mcg IntraVENous Q1H PRN Elbert Delarosa MD        norepinephrine (LEVOPHED) 16 mg in dextrose 5% 250 mL infusion  2-100 mcg/min IntraVENous Continuous Teagan Guerrero MD 9.4 mL/hr at 12/21/21 0946 10 mcg/min at 12/21/21 0946    sodium chloride flush 0.9 % injection 5-40 mL  5-40 mL IntraVENous 2 times per day Manas Williamson MD   10 mL at 12/21/21 0830    sodium chloride flush 0.9 % injection 5-40 mL  5-40 mL IntraVENous PRN Manas Williamson MD        0.9 % sodium chloride infusion  25 mL IntraVENous PRN Manas Williamson MD        ondansetron (ZOFRAN-ODT) disintegrating tablet 4 mg  4 mg Oral Q8H PRN Manas Williamson MD        Or    [facility] injection 4 mg  4 mg IntraVENous Q6H PRN Manas Williamson MD        acetaminophen (TYLENOL) tablet 650 mg  650 mg Oral Q6H PRN Manas Williamson MD   650 mg at 12/20/21 0047    Or    acetaminophen (TYLENOL) suppository 650 mg  650 mg Rectal Q6H PRN Donna King MD           History:      PM Hx:  Past Medical History:   Diagnosis Date    Acute kidney injury ([facility]     Bilateral carpal tunnel syndrome 11/15/2019    Chronic bilateral low back pain without sciatica 11/15/2019    Headache(784.0)     History of mammography, screening 1990's    History of shingles 2009    Obesity, Class III, BMI 40-49.9 (morbid obesity) ([facility] [address] 3/27/2018    Papanicolaou smear for cervical cancer screening     NEVER    Primary osteoarthritis of both knees 5/25/2016       PS Hx:  Past Surgical History:   Procedure Laterality Date    APPENDECTOMY      BREAST SURGERY  1990s       Social Hx:  Social History     Socioeconomic History    Marital status: Single     Spouse name: None    Number of children: None    Years of education: None    Highest education level: None   Occupational History    None   Tobacco Use    Smoking status: Never Smoker    Smokeless tobacco: Never Used   Substance and Sexual Activity    Alcohol use: No    Drug use: No    Sexual activity: Never   Other Topics Concern    None   Social History Narrative    None     Social Determinants of Health     Financial Resource Strain:     Difficulty of Paying Living Expenses: Not on file   Food Insecurity:     Worried About Running Out of Food in the Last Year: Not on file    Ian of Food in the Last Year: Not on file   Transportation Needs:     Lack of Transportation (Medical): Not on file    Lack of Transportation (Non-Medical):  Not on file   Physical Activity:     Days of Exercise per Week: Not on file    Minutes of Exercise per Session: Not on file   Stress:     Feeling of Stress : Not on file   Social Connections:     Frequency of Communication with Friends and Family: Not on file    Frequency of Social Gatherings with Friends and Family: Not on file    Attends Advent Services: Not on file    Active Member of Clubs or Organizations: Not on file    Attends Club or Organization Meetings: Not on file    Marital Status: Not on file   Intimate Partner Violence:     Fear of Current or Ex-Partner: Not on file    Emotionally Abused: Not on file    Physically Abused: Not on file    Sexually Abused: Not on file   Housing Stability:     Unable to Pay for Housing in the Last Year: Not on file    Number of Places Lived in the Last Year: Not on file    Unstable Housing in the Last Year: Not on file       Family Hx:  Family History   Problem Relation Age of Onset    High Blood Pressure Mother        LABS: Reviewed     CBC:  Lab Results   Component Value Date    WBC 17.0 12/21/2021    RBC 3.47 12/21/2021    HGB 12.2 12/21/2021    HCT 31.6 12/21/2021    MCV 91.0 12/21/2021    MCH 29.0 12/21/2021    MCHC 31.9 12/21/2021    RDW 14.7 12/21/2021     12/21/2021     CBC with Differential:   Lab Results   Component Value Date    WBC 17.0 12/21/2021    RBC 3.47 12/21/2021    HGB 12.2 12/21/2021    HCT 31.6 12/21/2021     12/21/2021    MCV 91.0 12/21/2021    MCH 29.0 12/21/2021    MCHC 31.9 12/21/2021    RDW 14.7 12/21/2021    BANDSPCT 3 12/15/2021    METASPCT 1 12/18/2021    LYMPHOPCT 4.7 12/20/2021    PROMYELOPCT 1 12/14/2021    MONOPCT 2.5 12/20/2021    MYELOPCT 1 12/14/2021    BASOPCT 0.1 12/20/2021    MONOSABS 0.5 12/20/2021    LYMPHSABS 0.9 12/20/2021    EOSABS 0.1 12/20/2021    BASOSABS 0.0 12/20/2021     CMP:    Lab Results   Component Value Date     12/21/2021    K 4.9 12/21/2021     12/21/2021    CO2 12 12/21/2021     12/21/2021    CREATININE 3.4 12/21/2021    CREATININE 3.36 12/21/2021    GFRAA 16 12/21/2021    LABGLOM 13 12/21/2021    GLUCOSE 120 12/21/2021    PROT 6.5 12/21/2021    LABALBU 2.3 12/21/2021    CALCIUM 9.0 12/21/2021    BILITOT <0.2 12/21/2021    ALKPHOS 140 12/21/2021    AST 22 12/21/2021    ALT 28 12/21/2021     BMP:    Lab Results   Component Value Date     12/21/2021    K 4.9 12/21/2021     12/21/2021    CO2 12 12/21/2021    BUN 120 12/21/2021    LABALBU 2.3 12/21/2021    CREATININE 3.4 12/21/2021    CREATININE 3.36 12/21/2021    CALCIUM 9.0 12/21/2021    GFRAA 16 12/21/2021    LABGLOM 13 12/21/2021    GLUCOSE 120 12/21/2021     TSH: No results found for: TSH  Vitamin B12 and Folate: No components found for: FOLIC,  S75  Urinalysis:   Lab Results   Component Value Date    NITRU Negative 12/20/2021    WBCUA 0-2 12/20/2021    BACTERIA Negative 12/20/2021    RBCUA  12/15/2021    BLOODU Negative 12/20/2021    SPECGRAV 1.015 12/20/2021    GLUCOSEU Negative 12/20/2021           FUNCTIONAL ADL´S:     Independent: [  ] Eating, [   ] Dressing, [   ] Transferring, [   ] Georgann Metro, [   ] Arvin Fiddler, [   ] Continence  Dependent   : [ x ] Eating, [ x ] Dressing, [ x ] Transferring, [ x ] Toileting, [ x ] Bathing, [ x ] Continence  W. assistant : [  ] Eating, [   ] Dressing, [   ] Transferring, [   ] Georgann Metro, [   ] Arvin Fiddler, [   ] Continence    Radiology: Reviewed      XR CHEST PORTABLE    Result Date: 12/12/2021  Exam: XR CHEST PORTABLE History:  post line placement Technique: AP portable view of the chest obtained. Comparison: none Chest x-ray portable Findings: The patient is intubated, there is an NG tube coursing towards the stomach    There is a  right internal jugular central line with the tip projecting in the region of the superior vena cava. The cardiomediastinal silhouette is within normal limits. There is no pneumothorax There are bilateral patchy alveolar opacities. There are no pleural effusions. Bones of the thorax appear intact. Status post central line placement. There are bilateral alveolar opacities , infiltrates worrisome for viral COVID-19 pneumonia. XR CHEST PORTABLE    Result Date: 12/12/2021  Exam: XR CHEST PORTABLE History:  f/u intubated yesterday, Covid Technique: AP portable view of the chest obtained.  Comparison: none Chest x-ray portable Findings: The patient is intubated, there is an NG tube coursing towards hyperglycemia  4. Hypercoagulable state    Total Time: 25 minutes with >50% spent counseling/care coordination.      Electronically signed by TEJINDER Toney CNP on 12/21/2021 at 9:47 AM

## 2021-12-21 NOTE — PROGRESS NOTES
Renal Progress Note    Assessment and Plan:     65 yo lady with severe covid 19 PNA. symptots started at very end of November. Admitted on 12/9. Intubated on 12/12. Worsening kidney function now. Not on pressors. Would consider her SHLOMO to be ATN.   Has hyperkalemia that improved w/ lokelma      Plan/  - changing fluids to hypotonic bicarb gtt      Patient Active Problem List:     Pneumonia due to COVID-19 virus     Acute respiratory failure with hypoxia (HCC)     Hypercoagulable state ([address]     Anemia     Anatomical narrow angle, bilateral     Bilateral carpal tunnel syndrome     Bilateral hearing loss     Chronic bilateral low back pain without sciatica     Floaters     Hyperopia     Lamellar macular hole of right eye     Meralgia paraesthetica, left     Murmur     Obesity, Class III, BMI 40-49.9 (morbid obesity) ([address]     Presbyopia of both eyes     Primary osteoarthritis of both knees     Pseudophakia of both eyes     Regular astigmatism     Tinnitus of both ears     Vitreomacular traction syndrome of right eye     Elevated liver function tests     Advance care planning     Goals of care, counseling/discussion     Palliative care encounter     Acute bacterial sinusitis     Acute kidney injury ([address]     Staphylococcus aureus pneumonia ([address]     Pseudomonas urinary tract infection      Subjective:   Admit Date: 12/9/2021    Interval History: remains intubated, function stable however Na up to 152       Medications:   Scheduled Meds:   sodium chloride flush  5-40 mL IntraVENous 2 times per day    insulin glargine  8 Units SubCUTAneous Nightly    cefepime  1,000 mg IntraVENous Q24H    [Held by provider] baricitinib  1 mg Oral Daily    heparin (porcine)  5,000 Units SubCUTAneous 3 times per day    famotidine (PEPCID) injection  10 mg IntraVENous Daily    polyethylene glycol  17 g Oral Daily    senna  5 mL Oral BID    insulin lispro  0-12 Units SubCUTAneous Q4H    chlorhexidine  15 mL Mouth/Throat BID  sodium chloride flush  5-40 mL IntraVENous 2 times per day     Continuous Infusions:   sodium chloride 50 mL/hr at 12/21/21 0940    sodium chloride      sodium chloride      dextrose      dexmedetomidine (PRECEDEX) IV infusion 1.4 mcg/kg/hr (12/21/21 0831)    propofol 50 mcg/kg/min (12/21/21 0939)    fentaNYL (SUBLIMAZE) infusion 200 mcg/hr (12/21/21 0818)    norepinephrine 10 mcg/min (12/21/21 0946)    sodium chloride         CBC:   Recent Labs     12/20/21 0600 12/20/21  0703 12/21/21 0600 12/21/21  0736   WBC 19.7*  --  17.0*  --    HGB 10.1*   < > 10.1* 12.2   *  --  361  --     < > = values in this interval not displayed. CMP:    Recent Labs     12/20/21 0600 12/20/21  0703 12/21/21  0045 12/21/21 0600 12/21/21  0736     --  142 152*  --    K 5.4*  --  4.8 4.9  --    *  --  112* 120*  --    CO2 20  --  17* 12*  --    *  --  118* 120*  --    CREATININE 3.36*   < > 2.92* 3.36* 3.4*   GLUCOSE 104*  --  180* 120*  --    CALCIUM 9.0  --  9.0 9.0  --    LABGLOM 13.5*   < > 15.8* 13.5* 13*    < > = values in this interval not displayed. Troponin: No results for input(s): TROPONINI in the last 72 hours. BNP: No results for input(s): BNP in the last 72 hours. INR: No results for input(s): INR in the last 72 hours. Lipids: No results for input(s): CHOL, LDLDIRECT, TRIG, HDL, AMYLASE, LIPASE in the last 72 hours. Liver:   Recent Labs     12/21/21  0600   AST 22   ALT 28   ALKPHOS 140*   PROT 6.5   LABALBU 2.3*   BILITOT <0.2     Iron:    No results for input(s): IRONS, FERRITIN in the last 72 hours. Invalid input(s): LABIRONS  Urinalysis: No results for input(s): UA in the last 72 hours.     Objective:   Vitals: BP (!) 110/43   Pulse 71   Temp 98.1 °F (36.7 °C) (Rectal)   Resp 26   Ht 5' 1\" (1.549 m)   Wt 226 lb 10.1 oz (102.8 kg)   SpO2 94%   BMI 42.82 kg/m²    Wt Readings from Last 3 Encounters:   12/21/21 226 lb 10.1 oz (102.8 kg)   12/09/21 200 lb (90.7

## 2021-12-21 NOTE — PROGRESS NOTES
Pulmonary & Critical Care Medicine ICU Progress Note  Chief complaint : Respiratory failure    Subjunctive/24 hour events :   Patient seen and examined during multidisciplinary rounds with RN, charge nurse, RT, pharmacy, dietitian, and social service. On vent, sedated, no response, currently on 90% FiO2, with 14 of PEEP, saturation 94%, no fever, she is on fentanyl propofol and Precedex, she is on Levophed at 10 mcg/min, tolerates tube feed,+ bowel movement, urine output 1400 cc, +11.5 L. Social History     Tobacco Use    Smoking status: Never Smoker    Smokeless tobacco: Never Used   Substance Use Topics    Alcohol use: No         Problem Relation Age of Onset    High Blood Pressure Mother        Recent Labs     12/21/21  0031 12/21/21  0736   PHART 7.180* 7.059*   [id number] 52* 65*   PO2ART 111* 76*       MV Settings:  Vent Mode: AC/VC Rate Set: 32 bmp/Vt Ordered: 370 mL/ /FiO2 : 90 %           IV:   sodium chloride      dextrose      dexmedetomidine (PRECEDEX) IV infusion 1.4 mcg/kg/hr (12/21/21 0831)    propofol 50 mcg/kg/min (12/21/21 0840)    fentaNYL (SUBLIMAZE) infusion 200 mcg/hr (12/21/21 0818)    norepinephrine 10 mcg/min (12/21/21 0840)    sodium chloride         Vitals:  BP (!) 110/43   Pulse 71   Temp 98.1 °F (36.7 °C) (Rectal)   Resp 26   Ht 5' 1\" (1.549 m)   Wt 226 lb 10.1 oz (102.8 kg)   SpO2 94%   BMI 42.82 kg/m²    Tmax:        Intake/Output Summary (Last 24 hours) at 12/21/2021 0845  Last data filed at 12/21/2021 0830  Gross per 24 hour   Intake 3040 ml   Output 1550 ml   Net 1490 ml       EXAM:  General: On vent, sedated, no distress  Head: normocephalic, atraumatic  Eyes:No gross abnormalities. ENT:  MMM no lesions, ET and OG tube in  Neck:  supple and no masses  Chest : Good air movement, bilateral rales, no wheezing, nontender, tympanic  Heart[de-identified] Heart sounds are normal.  Regular rate and rhythm without murmur, gallop or rub.   ABD:  symmetric, soft, no apparent tenderness  Musculoskeletal : no cyanosis, no clubbing and no edema  Neuro: Sedated, did not wake up or follow commands  Skin: No rashes or nodules noted. Lymph node:  no cervical nodes  Urology: Yes Serna   Psychiatric: unresponsive, sedated    Medications:  Scheduled Meds:   sodium chloride flush  5-40 mL IntraVENous 2 times per day    insulin glargine  8 Units SubCUTAneous Nightly    cefepime  1,000 mg IntraVENous Q24H    [Held by provider] baricitinib  1 mg Oral Daily    heparin (porcine)  5,000 Units SubCUTAneous 3 times per day    famotidine (PEPCID) injection  10 mg IntraVENous Daily    lactulose  20 g Oral TID    polyethylene glycol  17 g Oral Daily    senna  5 mL Oral BID    insulin lispro  0-12 Units SubCUTAneous Q4H    chlorhexidine  15 mL Mouth/Throat BID    sodium chloride flush  5-40 mL IntraVENous 2 times per day       PRN Meds:  sodium chloride flush, sodium chloride, glucose, dextrose, glucagon (rDNA), dextrose, fentanNYL, sodium chloride flush, sodium chloride, ondansetron **OR** ondansetron, acetaminophen **OR** acetaminophen    Results: reviewed by me   CBC:   Recent Labs     12/19/21  0600 12/19/21  0743 12/20/21  0600 12/20/21  0703 12/21/21  0031 12/21/21 0600 12/21/21  0736   WBC 20.4*  --  19.7*  --   --  17.0*  --    HGB 10.8*   < > 10.1*   < > 10.3* 10.1* 12.2   HCT 33.8*  --  31.9*  --   --  31.6*  --    MCV 88.6  --  90.0  --   --  91.0  --    *  --  409*  --   --  361  --     < > = values in this interval not displayed. BMP:   Recent Labs     12/19/21  0600 12/19/21  0743 12/20/21 0600 12/20/21  0703 12/21/21  0031 12/21/21  0045 12/21/21  0736     --  143  --   --  142  --    K 5.0*  --  5.4*  --   --  4.8  --    *  --  112*  --   --  112*  --    CO2 19*  --  20  --   --  17*  --    *  --  116*  --   --  118*  --    CREATININE 3.12*   < > 3.36*   < > 3.3* 2.92* 3.4*    < > = values in this interval not displayed.      LIVER PROFILE:   Recent Labs     12/19/21  0600 12/20/21  0600 12/21/21  0045   AST 19 25 21   ALT 36* 29 29   BILITOT 0.3 0.4 0.3   ALKPHOS 146* 160* 140*     PT/INR: No results for input(s): PROTIME, INR in the last 72 hours. APTT: No results for input(s): APTT in the last 72 hours. UA:  Recent Labs     12/20/21  2350   COLORU Yellow   PHUR 5.0   WBCUA 0-2   BACTERIA Negative   CLARITYU Clear   SPECGRAV 1.015   LEUKOCYTESUR Negative   UROBILINOGEN 0.2   BILIRUBINUR Negative   BLOODU Negative   GLUCOSEU Negative       Cultures:  Sputum culture shows MSSA, urine culture Pseudomonas  Films:  CXR reviewed by me and it showed worsening bilateral groundglass infiltrates  CT head shows no acute intracranial event, pansinusitis    Assessment:   This is a critically ill patient at risk of deterioration / death , needing close ICU monitoring and intervention due to below noted problems   · Acute hypoxic respiratory failure  · Severe COVID-19 pneumonia  · ARDS  · Septic shock  · MSSA pneumonia due to bacterial coinfection  · Pseudomonas UTI  · SHLOMO, improved today    Recommendation  · Vent support lung protective strategy  · head of the bed 30°  · Breathing trials   when lung mechanics improve  · Sedation holiday later today  · Watch for ICU delirium: TV on, natural light, avoid benzos, pain control, early mobility, and family engagement  · PUD prophylaxis  · DVT prophylaxis  · Levophed to maintain mean arterial pressure 65-70  · Watch renal function and urine output  · Appreciate nephrology  · Target blood sugar 140-180  · DC baricitinib in light of persistent sepsis  · 18 hours prone, 6 hours supine   · Continue tube feed  · Increase water flushes to 300 every 2 hours  · Half-normal saline at 50 cc/h and monitor sodium        Due to the immediate potential for life-threatening deterioration due to acute respiratory failure I spent 35  minutes providing critical care.  This time is excluding time spent performing procedures.           Electronically signed by Grayson Harry MD,  [facility] ,on 12/21/2021 at 8:45 AM

## 2021-12-21 NOTE — PROGRESS NOTES
Hospitalist Progress Note      PCP: No primary care provider on file. Date of Admission: 12/9/2021    Chief Complaint:    No chief complaint on file. Subjective:  Patient is intubated, prone; unable to complete full 12 point ROS    Medications:  Reviewed    Infusion Medications    sodium chloride      sodium bicarbonate infusion      sodium chloride      dextrose      dexmedetomidine (PRECEDEX) IV infusion 1.4 mcg/kg/hr (12/21/21 1154)    propofol 50 mcg/kg/min (12/21/21 1234)    fentaNYL (SUBLIMAZE) infusion 200 mcg/hr (12/21/21 1154)    norepinephrine 10 mcg/min (12/21/21 0946)    sodium chloride       Scheduled Medications    sodium chloride flush  5-40 mL IntraVENous 2 times per day    insulin glargine  8 Units SubCUTAneous Nightly    cefepime  1,000 mg IntraVENous Q24H    [Held by provider] baricitinib  1 mg Oral Daily    heparin (porcine)  5,000 Units SubCUTAneous 3 times per day    famotidine (PEPCID) injection  10 mg IntraVENous Daily    polyethylene glycol  17 g Oral Daily    senna  5 mL Oral BID    insulin lispro  0-12 Units SubCUTAneous Q4H    chlorhexidine  15 mL Mouth/Throat BID    sodium chloride flush  5-40 mL IntraVENous 2 times per day     PRN Meds: sodium chloride flush, sodium chloride, glucose, dextrose, glucagon (rDNA), dextrose, fentanNYL, sodium chloride flush, sodium chloride, ondansetron **OR** ondansetron, acetaminophen **OR** acetaminophen      Intake/Output Summary (Last 24 hours) at 12/21/2021 1325  Last data filed at 12/21/2021 1313  Gross per 24 hour   Intake 3140 ml   Output 2100 ml   Net 1040 ml       Exam:    BP (!) 110/43   Pulse 64   Temp 97.7 °F (36.5 °C) (Rectal)   Resp 30   Ht 5' 1\" (1.549 m)   Wt 226 lb 10.1 oz (102.8 kg)   SpO2 97%   BMI 42.82 kg/m²     General appearance: Intubated  HEENT: Conjunctivae/corneas clear. Neck: Trachea midline. Respiratory:  Normal respiratory effort.  Clear to auscultation  Cardiovascular: Regular rate and rhythm   Abdomen: Soft, non-tender, non-distended with normal bowel sounds. Musculoskeletal: No clubbing, cyanosis or edema bilaterally. Neuro: Sedated  Capillary Refill: Brisk,< 3 seconds   Peripheral Pulses: +2 palpable, equal bilaterally     Labs:   Recent Labs     12/19/21  0600 12/19/21  0743 12/20/21  0600 12/20/21  0703 12/21/21  0031 12/21/21  0600 12/21/21  0736   WBC 20.4*  --  19.7*  --   --  17.0*  --    HGB 10.8*   < > 10.1*   < > 10.3* 10.1* 12.2   HCT 33.8*  --  31.9*  --   --  31.6*  --    *  --  409*  --   --  361  --     < > = values in this interval not displayed. Recent Labs     12/20/21  0600 12/20/21  0703 12/21/21  0045 12/21/21  0600 12/21/21  0736     --  142 152*  --    K 5.4*  --  4.8 4.9  --    *  --  112* 120*  --    CO2 20  --  17* 12*  --    *  --  118* 120*  --    CREATININE 3.36*   < > 2.92* 3.36* 3.4*   CALCIUM 9.0  --  9.0 9.0  --     < > = values in this interval not displayed. Recent Labs     12/20/21  0600 12/21/21  0045 12/21/21  0600   AST 25 21 22   ALT 29 29 28   BILITOT 0.4 0.3 <0.2   ALKPHOS 160* 140* 140*     No results for input(s): INR in the last 72 hours. No results for input(s): Lu Dross in the last 72 hours. Urinalysis:      Lab Results   Component Value Date    NITRU Negative 12/20/2021    WBCUA 0-2 12/20/2021    BACTERIA Negative 12/20/2021    RBCUA  12/15/2021    BLOODU Negative 12/20/2021    SPECGRAV 1.015 12/20/2021    GLUCOSEU Negative 12/20/2021       Radiology:  US RETROPERITONEAL LIMITED   Final Result      NEGATIVE LIMITED RENAL ULTRASOUND. CHOLELITHIASIS. XR CHEST (SINGLE VIEW FRONTAL)   Final Result      CT HEAD WO CONTRAST   Final Result   No acute intracranial abnormality; no acute infarct or intracranial hemorrhage. Extensive pansinusitis with maxillary sinus air-fluid levels and bilateral mastoid effusions, likely related to tube placement.       IR PICC WO SQ PORT/PUMP > 5 YEARS   Final Result      US ABDOMEN LIMITED   Final Result      CHOLELITHIASIS WITHIN A MILDLY DISTENDED GALLBLADDER. NO BILIARY DILATATION OR OTHER FINDINGS OF ACUTE CHOLECYSTITIS, WITHIN THE LIMITS OF THE STUDY. XR CHEST PORTABLE   Final Result   Status post central line placement. There are bilateral alveolar opacities , infiltrates worrisome for viral COVID-19 pneumonia. XR CHEST PORTABLE   Final Result   There are bilateral alveolar opacities , infiltrates worrisome for viral COVID-19 pneumonia. XR CHEST PORTABLE   Final Result   Status post intubation. There are bilateral alveolar opacities , infiltrates worrisome for viral COVID-19 pneumonia. CTA CHEST W WO CONTRAST   Final Result      No CT evidence for pulmonary embolus. Ground glass opacities, associated with COVID 19 pneumonia among multiple other etiologies. Assessment/Plan:    # Acute hypoxic and hypercapnic respiratory failure 2/2 COVID-19 pneumonia, MSSA pneumonia with septic shock  - completed Remdesivir, continue decadron   -  Proning per pulm recs  - ID consulted- continue meropenem  - pressors as needed  - RCx with MSSA, UCx with pseudomonas - on abx     # SHLOMO; hypernatremia    - likely secondary to ATN; stable today nephrology is following and assisting with fluid management     # Hyperglycemia and new onset DM  - SSI     DVT: lovenox     Disposition: Continues to be in critical condition and continues to require ICU care. Continuing to monitor UOP.  Pulm, ID consulted.          Active Hospital Problems    Diagnosis Date Noted    Staphylococcus aureus pneumonia ([facility] [address] [J15.211]     Pseudomonas urinary tract infection [N39.0, B96.5]     Acute kidney injury ([address] [N17.9]     Acute bacterial sinusitis [J01.90, B96.89]     Elevated liver function tests [R79.89]     Advance care planning [Z71.89]     Goals of care, counseling/discussion [Z71.89]     Palliative care encounter [Z51.5]     Acute respiratory failure with hypoxia ([facility] [address] [J96.01]     [facility]coagulable state ([facility] [D68.59]     Sepsis due to COVID-19 ([facility] [U07.1, A41.89] 12/09/2021       Additional work up or/and treatment plan may be added today or then after based on clinical progression. I am managing a portion of pt care. Some medical issues are handled by other specialists. Additional work up and treatment should be done in out pt setting by pt PCP and other out pt providers. In addition to examining and evaluating pt, I spent additional time explaining care, normal and abnormal findings, and treatment plan. All of pt questions were answered. Counseling, diet and education were  provided. Case will be discussed with nursing staff when appropriate. Family will be updated if and when appropriate. Diet: Diet NPO  ADULT TUBE FEEDING; Orogastric; Peptide Based High Protein; Continuous; 20; No; 300;  Other (specify); q 2 hrs; Protein; add 1 proteinex with water flush daily    Code Status: Full Code    Electronically signed by Lilly Carrizales MD on 12/21/2021 at 1:25 PM

## 2021-12-21 NOTE — PROGRESS NOTES
Daily Update    From: Home-     PMH: Shingles, headaches, anemia. Anticipated Discharge Date: TBD    Anticipated Discharge Disposition: TBD    Patient Mobility or PT/OT ordered: NA - On vent. Consults: Pulm, ID, Palliative Care, renal.    Covid Test Date and Result: 11/29 Home COVID test - positive. 12/11/21~Intubated. Barriers to Discharge:   Patient remains on the vent, highly sedated for safety. Patient is on high doses of fent, propofol, and Precedex gtts. [facility] completed yesterday 12/20 - no acute findings. Renal function worsening, creatinine now 3.36, Na 152. IVF to change to bicarb gtt per renal provider. Baricitininb and Cefipime continue per orders. Per 12/20 CM notes, Pall Care met with family to discuss prognosis and goals of care. DC plan pending progress and care needs.

## 2021-12-21 NOTE — PROGRESS NOTES
PHARMACY NOTE:   Interdisciplinary Rounds Completed     ICU Day #10     Changes made today by Pharmacy:   No medication changes made today by pharmacy during interdisciplinary care rounds       Additional information:  Steroid: Completed 10 days of Decadron therapy 21  Insulin coverage:   SSI: Medium Dose  Lantus: 8 units daily  Humalo units in the past 24 hours  Pressors: Levophed @ 10mcg/min, or 0.097mcg/kg/min   Sedation: Precedex @ 1.4mcg/kg/hr, Fentanyl @ 200mcg/hr, Propofol @ 50mcg/kg/min   Antimicrobial therapy: Meropenem 1000 mg every 12 hours. Baricitinib discontinued. S/p remdesivir.  ID following    Core measures assessed/met    Matilda Quispe PharmD, BCPS   2021 1:27 PM

## 2021-12-22 NOTE — PROGRESS NOTES
Spiritual Care Services     Summary of Visit:  Compassionate Wean event:    Patient was extubated at family's request. Family gathered around and shared memories of their sister. Tears and laughter mixed together as the spoke of her life and her impact upon their children. This  was able to pray with the family and commend the patient to the Lord's care. Family share the  home information and this was noted in the [address]. Spiritual Assessment/Intervention/Outcomes:    Encounter Summary  Services provided to[de-identified] Patient and family together  Referral/Consult From[de-identified] Nurse  Support System: Family members  [facility]  Contact Protestant: No  Continue Visiting: No  Complexity of Encounter: Moderate  Length of Encounter: 45 minutes  Spiritual Assessment Completed: Yes  Advance Care Planning: Yes  Routine  Type: Follow up  Assessment: Unable to respond  Intervention: Sustaining presence/ Ministry of presence,Prayer  Outcome: Comfort,Expressed gratitude,Engaged in conversation,Receptive,Encouraged           Grief and Life Adjustment  Type: End of life,Grief and loss,Death  Assessment: Approachable,Tearful,Grieving,Guilt,Unresolved grief  Intervention: Active listening,Explored feelings, thoughts, concerns,Prayer,Grief care,Discussed afterlife,Discussed relationship with God,Discussed belief system/Rastafarian practices/leonard  Outcome: Comfort,Expressed gratitude,Engaged in conversation,Grieving,Tearful,Receptive  Primary Decision Maker (Healthcare Proxy)  Patient's Healthcare Decision Maker is[de-identified] Named in [facility] [address] / Beliefs  Do you have any ethnic, cultural, sacramental, or spiritual Rastafarian needs you would like us to be aware of while you are in the hospital?: No    Care Plan:    No follow up required.     Spiritual Care Services   Electronically signed by Alfredo Quesada on 21 at 5:30 PM EST     To reach a  for emotional and spiritual support, place an EPIC consult request.   If a  is needed immediately, dial 0 and ask to page the on-call .

## 2021-12-22 NOTE — PROGRESS NOTES
Palliative Care Progress Note  Patient: Anamaria Kramer  Gender: female  YOB: 1950  Unit/Bed: IC10/IC10-01  Code Status: Full Code  Inpatient Treatment Team: Treatment Team: Attending Provider: Wale Curran MD; Consulting Physician: Bella Rodarte MD; Utilization Reviewer: Tiffanie Hodge, RN; Consulting Physician: Shira Morocho MD; Utilization Reviewer: Tripp Mcdonald, RN; Consulting Physician: Stefani Triplett MD; Registered Nurse: Selma Brasher, LINDA; Registered Nurse: Viraj Vallejo, LINDA  Admit Date:  12/9/2021    Chief Complaint: Shortness of breath    History of Presenting Illness:      Anamaria Kramer is a 66 y.o. female on hospital day 13 with a history of anemia, carpal tunnel, heart murmur, OA, and hearing loss. Patient presented to the ER from home with shortness of breath. She started feeling sick 10 days ago with headache, body aches, and fatigue. Patient also reported poor appetite, intermittent chills, fever, and cough. Currently not vaccinated. Patient was admitted to a regular medical floor with COVID-19 pneumonia. She later was transferred to the ICU and intubated. Patient remains sedated and intubated with FiO2 of 70% PEEP of 14. Patient is currently unresponsive. HPI limited due to unresponsive. Spoke with sister, Rola Sabillon. Rola Sabillon reports that patient's HPOA is her sister Mony Pace, however, Mony Pace is currently awaiting an ICU bed as well at [facility].      Most recent notable labs: ABGs: pH 7.303, PCO2 50, PO2 59, O2 Sat 87, Creat. 1.99, GFR 24.6, albumin 2.6, TB 3.1, Alk Phos. 147, , .    12/15/21:  Patient was proned and back to supine. Vent support was decreased until placed supine again. She remains on sedation and unresponsive. When proned, FiO2 was at 50% and PEEP at 10. Most recent notable labs: pO2, arterial 69, O2 Sat, Arterial 91, creat.  1.19, GFR 44.6, BUN 45, albumin 2.6, AST 64, WBC 19.3.    12/16/21:  Patient currently proned, intubated, and sedated. She is unresponsive. FiO2 at 90%. Most recent notable labs: WBC 19.8, 5.53, creat. 1.96, GFR 25.1, BUN 53, Albumin 2.4, ALT 59, pH 7.172, pCO2 76, pO2 131.    12/17/21      Remains proned and intubated, sedated, on pressors, unresponsive. Oxygenation status has been better. Currently on 70% FiO2 and PEEP of 12. Urine output has been decreasing. Kidney function has been getting worse. 12/18/21     Remains intubated currently on 80% FiO2, saturation 94%, on levophed, renal function declining. She responds to discomfort minimally even when on sedation holiday. Her oxygenation also drops into the 80s on sedation holiday. Appears calm, BPS 3.    12/21/21      Remains intubated assist control 80%, off sedation,not responsive, Spiking high fevers and persistent leukocytosis, despite meropenem for Pseudomonas UTI and staph aureus pneumonia. Antibiotic changed to cefepime. Levophed for pressure support. Poor prognosis. 12/22/21  Paralyzed and sedated, currently on 80% FiO2, 14 of PEEP, saturation 96%, ventilation improved slowly, she is on Levophed at 20 mcg/min, currently on propofol fentanyl and Precedex, along with Nimbex drip. BPS 3. with change of antibiotics, WBC trending down and no fever today. Will be weaning off of nimbex. Prognosis remains poor        Review of Systems:       Review of Systems   Unable to perform ROS: Intubated   All other systems reviewed and are negative. Physical Examination:       BP (!) 124/52   Pulse 57   Temp 97.5 °F (36.4 °C) (Rectal)   Resp (!) 32   Ht 5' 1\" (1.549 m)   Wt 234 lb 9.1 oz (106.4 kg)   SpO2 97%   BMI 44.32 kg/m²    Physical Exam  Constitutional:       Appearance: She is ill-appearing. Interventions: She is sedated and intubated. HENT:      Head: Normocephalic and atraumatic. Nose: No rhinorrhea. Eyes:      General:         Right eye: No discharge. Comments: Unable to assess due to prone position. Neck:      Comments: Unable to assess due to prone position. Cardiovascular:      Rate and Rhythm: Normal rate and regular rhythm. Pulmonary:      Effort: She is intubated. Breath sounds: Decreased breath sounds present. Abdominal:      Comments: Unable to assess due to prone position. Genitourinary:     Comments: Serna with clear yellow urine. Musculoskeletal:      Right lower leg: No edema. Left lower leg: No edema. Skin:     General: Skin is warm and dry. Neurological:      Mental Status: She is unresponsive. Comments: Sedated. Psychiatric:         Speech: She is noncommunicative. Cognition and Memory: Cognition is impaired.          Allergies:      No Known Allergies    Medications:      Current Facility-Administered Medications   Medication Dose Route Frequency Provider Last Rate Last Admin    sodium bicarbonate 150 mEq in dextrose 5 % 1,000 mL infusion   IntraVENous Continuous Rosa Haywood  mL/hr at 12/22/21 0954 New Bag at 12/22/21 0954    metoclopramide (REGLAN) injection 10 mg  10 mg IntraVENous TID Rosa Haywood MD   10 mg at 12/22/21 0808    cisatracurium besylate (NIMBEX) 200 mg in sodium chloride 0.9 % 100 mL infusion  0.5-10 mcg/kg/min IntraVENous Continuous Rosa Haywood MD   Stopped at 12/22/21 0925    sodium chloride flush 0.9 % injection 5-40 mL  5-40 mL IntraVENous 2 times per day Breanne Mohinder, APRN - CNP   10 mL at 12/22/21 0808    sodium chloride flush 0.9 % injection 5-40 mL  5-40 mL IntraVENous PRN Breanne Hazard, APRN - CNP        0.9 % sodium chloride infusion  25 mL IntraVENous PRN Breanne Hazard, APRN - CNP        insulin glargine (LANTUS) injection vial 8 Units  8 Units SubCUTAneous Nightly Rosa Haywood MD   8 Units at 12/21/21 2032    cefepime (MAXIPIME) 1000 mg IVPB minibag  1,000 mg IntraVENous Q24H Phuong Trejo MD   Stopped at 12/21/21 1637    [Held by provider] baricitinib (OLUMIANT) tablet 1 mg  1 mg Oral Daily Renny Conti MD   1 mg at 12/20/21 8907    heparin (porcine) injection 5,000 Units  5,000 Units SubCUTAneous 3 times per day Jackie Bran MD   5,000 Units at 12/22/21 0545    famotidine (PEPCID) injection 10 mg  10 mg IntraVENous Daily [facility] DO   10 mg at 12/22/21 4527    polyethylene glycol (GLYCOLAX) packet 17 g  17 g Oral Daily Adelina Iba, APRN - CNP   17 g at 12/22/21 0808    senna (SENOKOT) 8.8 MG/5ML syrup 8.8 mg  5 mL Oral BID Adelina Iba, APRN - CNP   8.8 mg at 12/22/21 0808    glucose (GLUTOSE) 40 % oral gel 15 g  15 g Oral PRN Wale Curran MD        dextrose 50 % IV solution  12.5 g IntraVENous PRN Wale Curran MD        glucagon (rDNA) injection 1 mg  1 mg IntraMUSCular PRN Wale Curran MD        dextrose 5 % solution  100 mL/hr IntraVENous PRN Wale Curran MD        insulin lispro (HUMALOG) injection vial 0-12 Units  0-12 Units SubCUTAneous Q4H Wale Curran MD   2 Units at 12/22/21 0544    dexmedetomidine (PRECEDEX) 1,000 mcg in sodium chloride 0.9 % 250 mL infusion  0.2-1.4 mcg/kg/hr IntraVENous Continuous Shira Morocho MD 32.97 mL/hr at 12/22/21 0455 1.4 mcg/kg/hr at 12/22/21 0455    propofol injection  5-50 mcg/kg/min IntraVENous Titrated Shira Morocho MD 28.3 mL/hr at 12/22/21 0950 50 mcg/kg/min at 12/22/21 0950    fentaNYL (SUBLIMAZE) 1,000 mcg in sodium chloride 0.9 % 100 mL infusion  12.5-200 mcg/hr IntraVENous Continuous Shira Morocho MD 2 mL/hr at 12/22/21 0950 20 mcg/hr at 12/22/21 0950    chlorhexidine (PERIDEX) 0.12 % solution 15 mL  15 mL Mouth/Throat BID Wale Curran MD   15 mL at 12/22/21 0808    fentaNYL (SUBLIMAZE) injection 50 mcg  50 mcg IntraVENous Q1H PRN Remus Poplin, MD        norepinephrine (LEVOPHED) 16 mg in dextrose 5% 250 mL infusion  2-100 mcg/min IntraVENous Continuous Shira Morocho MD 15 mL/hr at 12/22/21 1047 16 mcg/min at 12/22/21 1047    sodium chloride flush 0.9 % injection 5-40 mL  5-40 mL IntraVENous 2 times on file   Transportation Needs:     Lack of Transportation (Medical): Not on file    Lack of Transportation (Non-Medical):  Not on file   Physical Activity:     Days of Exercise per Week: Not on file    Minutes of Exercise per Session: Not on file   Stress:     Feeling of Stress : Not on file   Social Connections:     Frequency of Communication with Friends and Family: Not on file    Frequency of Social Gatherings with Friends and Family: Not on file    Attends Latter day Services: Not on file    Active Member of Clubs or Organizations: Not on file    Attends Club or Organization Meetings: Not on file    Marital Status: Not on file   Intimate Partner Violence:     Fear of Current or Ex-Partner: Not on file    Emotionally Abused: Not on file    Physically Abused: Not on file    Sexually Abused: Not on file   Housing Stability:     Unable to Pay for Housing in the Last Year: Not on file    Number of Places Lived in the Last Year: Not on file    Unstable Housing in the Last Year: Not on file       Family Hx:  Family History   Problem Relation Age of Onset    High Blood Pressure Mother        LABS: Reviewed     CBC:  Lab Results   Component Value Date    WBC 16.0 12/22/2021    RBC 3.29 12/22/2021    HGB 13.0 12/22/2021    HCT 30.0 12/22/2021    MCV 91.4 12/22/2021    MCH 28.4 12/22/2021    MCHC 31.1 12/22/2021    RDW 15.0 12/22/2021     12/22/2021     CBC with Differential:   Lab Results   Component Value Date    WBC 16.0 12/22/2021    RBC 3.29 12/22/2021    HGB 13.0 12/22/2021    HCT 30.0 12/22/2021     12/22/2021    MCV 91.4 12/22/2021    MCH 28.4 12/22/2021    MCHC 31.1 12/22/2021    RDW 15.0 12/22/2021    BANDSPCT 3 12/21/2021    METASPCT 2 12/21/2021    LYMPHOPCT 5.6 12/22/2021    PROMYELOPCT 1 12/14/2021    MONOPCT 1.1 12/22/2021    MYELOPCT 3 12/21/2021    BASOPCT 0.1 12/22/2021    MONOSABS 0.2 12/22/2021    LYMPHSABS 0.9 12/22/2021    EOSABS 0.1 12/22/2021    BASOSABS 0.0 12/22/2021     CMP:    Lab Results   Component Value Date     12/22/2021    K 4.8 12/22/2021     12/22/2021    CO2 18 12/22/2021     12/22/2021    CREATININE 3.6 12/22/2021    CREATININE 3.41 12/22/2021    GFRAA 15 12/22/2021    LABGLOM 12 12/22/2021    GLUCOSE 164 12/22/2021    PROT 5.8 12/22/2021    LABALBU 2.1 12/22/2021    CALCIUM 8.6 12/22/2021    BILITOT <0.2 12/22/2021    ALKPHOS 118 12/22/2021    AST 11 12/22/2021    ALT 18 12/22/2021     BMP:    Lab Results   Component Value Date     12/22/2021    K 4.8 12/22/2021     12/22/2021    CO2 18 12/22/2021     12/22/2021    LABALBU 2.1 12/22/2021    CREATININE 3.6 12/22/2021    CREATININE 3.41 12/22/2021    CALCIUM 8.6 12/22/2021    GFRAA 15 12/22/2021    LABGLOM 12 12/22/2021    GLUCOSE 164 12/22/2021     TSH: No results found for: TSH  Vitamin B12 and Folate: No components found for: FOLIC,  V60  Urinalysis:   Lab Results   Component Value Date    NITRU Negative 12/20/2021    WBCUA 0-2 12/20/2021    BACTERIA Negative 12/20/2021    RBCUA  12/15/2021    BLOODU Negative 12/20/2021    SPECGRAV 1.015 12/20/2021    GLUCOSEU Negative 12/20/2021           FUNCTIONAL ADL´S:     Independent: [  ] Eating, [   ] Dressing, [   ] Transferring, [   ] Sophia Ojeda, [   ] Ana Us, [   ] Continence  Dependent   : [ x ] Eating, [ x ] Dressing, [ x ] Transferring, [ x ] Toileting, [ x ] Bathing, [ x ] Continence  W. assistant : [  ] Eating, [   ] Dressing, [   ] Transferring, [   ] Sophia Ojeda, [   ] Ana Us, [   ] Continence    Radiology: Reviewed      XR CHEST PORTABLE    Result Date: 12/12/2021  Exam: XR CHEST PORTABLE History:  post line placement Technique: AP portable view of the chest obtained. Comparison: none Chest x-ray portable Findings: The patient is intubated, there is an NG tube coursing towards the stomach    There is a  right internal jugular central line with the tip projecting in the region of the superior vena cava.  The cardiomediastinal silhouette is within normal limits. There is no pneumothorax There are bilateral patchy alveolar opacities. There are no pleural effusions. Bones of the thorax appear intact. Status post central line placement. There are bilateral alveolar opacities , infiltrates worrisome for viral COVID-19 pneumonia. XR CHEST PORTABLE    Result Date: 12/12/2021  Exam: XR CHEST PORTABLE History:  f/u intubated yesterday, Covid Technique: AP portable view of the chest obtained. Comparison: none Chest x-ray portable Findings: The patient is intubated, there is an NG tube coursing towards the stomach  The cardiomediastinal silhouette is within normal limits. There are bilateral patchy alveolar opacities. There are no pleural effusions. Bones of the thorax appear intact. There are bilateral alveolar opacities , infiltrates worrisome for viral COVID-19 pneumonia. XR CHEST PORTABLE    Result Date: 12/11/2021  Exam: XR CHEST PORTABLE History:  intubation Technique: AP portable view of the chest obtained. Comparison: none Chest x-ray portable Findings: The patient is intubated with the tip of the endotracheal tube 4 cm above the lolly. The cardiac silhouette is at the upper limits of normal in size. There is no pneumothorax. There are bilateral patchy alveolar opacities. Bones of the thorax appear intact. Status post intubation. There are bilateral alveolar opacities , infiltrates worrisome for viral COVID-19 pneumonia. Assessment and plan:      12/16/21:  Spoke with sister, Donald Houston, to give update. Also sent her a completed form for her travel insurance as she had to cancel cruise due to the complexity of patient's medical condition and needing to make decisions for her. No other questions or concerns at this time. 12/15/21:  Discussed care and provided update to patient's sister, Donald Houston. All questions answered.  Will write letter for Eden's travel insurance, as she was suppose to go on a cruise but had to cancel due to her sister's condition and needing to make healthcare decisions for her. 1. Palliative care encounter  Explained the role of palliative care in treating patient. Discussed symptom management related to chronic disease/condition. Provided emotional support and active listening. Patient's sister understands and is agreeable to current plan. 21     I spoke with her sister Frantz Lloyd to offer update. I discussed our concern with Marina's lack of response when she is on sedation holiday. Discussed how her prognosis is guarded and if she is unable to maintain her own airway she would require a trach and peg to maintain airway and nutrition and may remain that way the rest of her life. Let Frantz Lloyd know that Tim Son is off isolation. Answered all questions to the best of my ability. 22      I spoke with Frantz Lloyd who had already spoken with Intensivist and she was aware that Tim Son is not improving despite aggressive interventions. Frantz Lloyd had questions about ECMO, I answered all questions to the best of my ability. Discussed ARDS and repercussions on the body to the best of my ability. She is flying back into [facility] to see her sister and meet with medical team in am.    -Advance Care Planning  Discussed goals of care with patient's sister Frantz Lloyd. Patient shall remain a FULL CODE at this time. HPOA in place: Woodrow Thomas (sister). -Goals of Care Discussion:  Disease process and goals of treatment were discussed in basic terms, family goal is to optimize available comfort care measures and continue with full treatment of COVID-19 infection. We discussed the palliative care philosophy in light of those goals. We discussed all care options contingent on treatment response and QOL. Much active listening, presence, and emotional support were given. Patient is being treated for multiple medical conditions this admission includin.  Acute respiratory failure with hypoxia secondary to COVID-19 pneumonia  2. Hyperkalemia  3. Steroid induced hyperglycemia  4. Hypercoagulable state  5. ARDS  6. Septic shock  7. MSSA PNA  8. Pseudomonas UTI  9.  SHLOMO        Electronically signed by TEJINDER Goncalves CNP on 12/22/2021 at 11:39 AM

## 2021-12-22 NOTE — FLOWSHEET NOTE
Shift assessment:  Report from Yvette Crockett at [facility]. patient assessment and vital signs are stable. Patient is prone and on paralytic with sedation. Patient had train of 4 checked and is currently 4/4 at 3 nimbex infusion increased. No issues overnight. Report to [employer].  Lenore Dallas RN

## 2021-12-22 NOTE — PROGRESS NOTES
Renal Progress Note    Assessment and Plan:     65 yo lady with severe covid 19 PNA. symptots started at very end of November. Admitted on 12/9. Intubated on 12/12. Worsening kidney function now. Not on pressors. Would consider her SHLOMO to be ATN.   Has hyperkalemia that improved w/ lokelma      Plan/  - continue bicarb gtt, changing to isotonic solution   - monitor function, currently stable       Patient Active Problem List:     Pneumonia due to COVID-19 virus     Acute respiratory failure with hypoxia ([address]     Hypercoagulable state ([address]     Anemia     Anatomical narrow angle, bilateral     Bilateral carpal tunnel syndrome     Bilateral hearing loss     Chronic bilateral low back pain without sciatica     Floaters     Hyperopia     Lamellar macular hole of right eye     Meralgia paraesthetica, left     Murmur     Obesity, Class III, BMI 40-49.9 (morbid obesity) ([address]     Presbyopia of both eyes     Primary osteoarthritis of both knees     Pseudophakia of both eyes     Regular astigmatism     Tinnitus of both ears     Vitreomacular traction syndrome of right eye     Elevated liver function tests     Advance care planning     Goals of care, counseling/discussion     Palliative care encounter     Acute bacterial sinusitis     Acute kidney injury ([address]     Staphylococcus aureus pneumonia ([address]     Pseudomonas urinary tract infection      Subjective:   Admit Date: 12/9/2021    Interval History: remains intubated, function stable, Na trending down, now 137       Medications:   Scheduled Meds:   metoclopramide  10 mg IntraVENous TID    sodium chloride flush  5-40 mL IntraVENous 2 times per day    insulin glargine  8 Units SubCUTAneous Nightly    cefepime  1,000 mg IntraVENous Q24H    heparin (porcine)  5,000 Units SubCUTAneous 3 times per day    famotidine (PEPCID) injection  10 mg IntraVENous Daily    polyethylene glycol  17 g Oral Daily    senna  5 mL Oral BID    insulin lispro  0-12 Units SubCUTAneous Q4H    chlorhexidine  15 mL Mouth/Throat BID    sodium chloride flush  5-40 mL IntraVENous 2 times per day     Continuous Infusions:   sodium bicarbonate infusion 100 mL/hr at 12/22/21 0954    cisatracurium (NIMBEX) infusion Stopped (12/22/21 0925)    sodium chloride      dextrose      dexmedetomidine (PRECEDEX) IV infusion 1.4 mcg/kg/hr (12/22/21 1316)    propofol 40 mcg/kg/min (12/22/21 1318)    fentaNYL (SUBLIMAZE) infusion 175 mcg/hr (12/22/21 1248)    norepinephrine 16 mcg/min (12/22/21 1315)    sodium chloride         CBC:   Recent Labs     12/21/21  0600 12/21/21  0736 12/22/21  0448 12/22/21  0749   WBC 17.0*  --  16.0*  --    HGB 10.1*   < > 9.3* 13.0     --  304  --     < > = values in this interval not displayed. CMP:    Recent Labs     12/21/21  0045 12/21/21  0045 12/21/21  0600 12/21/21  0736 12/22/21  0016 12/22/21  0448 12/22/21  0749     --  152*  --   --  137  --    K 4.8  --  4.9  --   --  4.8  --    *  --  120*  --   --  104  --    CO2 17*  --  12*  --   --  18*  --    *  --  120*  --   --  109*  --    CREATININE 2.92*   < > 3.36*   < > 3.2* 3.41* 3.6*   GLUCOSE 180*  --  120*  --   --  164*  --    CALCIUM 9.0  --  9.0  --   --  8.6  --    LABGLOM 15.8*   < > 13.5*   < > 14* 13.2* 12*    < > = values in this interval not displayed. Troponin: No results for input(s): TROPONINI in the last 72 hours. BNP: No results for input(s): BNP in the last 72 hours. INR: No results for input(s): INR in the last 72 hours. Lipids: No results for input(s): CHOL, LDLDIRECT, TRIG, HDL, AMYLASE, LIPASE in the last 72 hours. Liver:   Recent Labs     12/22/21  0448   AST 11   ALT 18   ALKPHOS 118   PROT 5.8*   LABALBU 2.1*   BILITOT <0.2     Iron:    No results for input(s): IRONS, FERRITIN in the last 72 hours. Invalid input(s): LABIRONS  Urinalysis: No results for input(s): UA in the last 72 hours.     Objective:   Vitals: BP (!) 124/52   Pulse 57   Temp 96.4 °F (35.8 °C) (Rectal)   Resp (!) 32   Ht 5' 1\" (1.549 m)   Wt 234 lb 9.1 oz (106.4 kg)   SpO2 98%   BMI 44.32 kg/m²    Wt Readings from Last 3 Encounters:   12/22/21 234 lb 9.1 oz (106.4 kg)   12/09/21 200 lb (90.7 kg)   12/13/11 237 lb (107.5 kg)      24HR INTAKE/OUTPUT:      Intake/Output Summary (Last 24 hours) at 12/22/2021 1422  Last data filed at 12/22/2021 0602  Gross per 24 hour   Intake 6977.83 ml   Output 825 ml   Net 6152.83 ml       General: intubated, sedated  HEENT: normocephalic, atraumatic, ETT in place  Lungs: intubated, on vent, coarse breath sounds  Heart: regular rate and rhythm, no murmurs or rubs  Abdomen: soft, non-tender, non-distended  Ext: no cyanosis, no peripheral edema  Neuro: sedated      Electronically signed by Regulo Taylor MD on 12/22/2021 at 2:22 PM

## 2021-12-22 NOTE — PROGRESS NOTES
Pulmonary & Critical Care Medicine ICU Progress Note  Chief complaint : Respiratory failure    Subjunctive/24 hour events :   Patient seen and examined during multidisciplinary rounds with RN, charge nurse, RT, pharmacy, dietitian, and social service. Currently supine position, she is paralyzed and sedated, currently on 80% FiO2, 14 of PEEP, saturation 96%, ventilation improved slowly, she is on Levophed at 20 mcg/min, currently on propofol fentanyl and Precedex, along with Nimbex drip. Temperature 99.7, urine output 1000 cc, she is +17 L, tolerates tube feeds ,+ bowel movement    Social History     Tobacco Use    Smoking status: Never Smoker    Smokeless tobacco: Never Used   Substance Use Topics    Alcohol use: No         Problem Relation Age of Onset    High Blood Pressure Mother        Recent Labs     12/22/21  0016 12/22/21  0749   PHART 7.097* 7.109*   [id number] 66* 62*   PO2ART 212* 85*       MV Settings:  Vent Mode: AC/VC Rate Set: 32 bmp/Vt Ordered: 370 mL/ /FiO2 : 80 %           IV:   sodium bicarbonate infusion 100 mL/hr at 12/22/21 0817    cisatracurium (NIMBEX) infusion 2.3 mcg/kg/min (12/22/21 0807)    sodium chloride      dextrose      dexmedetomidine (PRECEDEX) IV infusion 1.4 mcg/kg/hr (12/22/21 0455)    propofol 50 mcg/kg/min (12/22/21 0715)    fentaNYL (SUBLIMAZE) infusion 20 mcg/hr (12/22/21 0411)    norepinephrine 20 mcg/min (12/22/21 0530)    sodium chloride         Vitals:  BP (!) 103/46   Pulse 65   Temp 99.7 °F (37.6 °C)   Resp (!) 32   Ht 5' 1\" (1.549 m)   Wt 234 lb 9.1 oz (106.4 kg)   SpO2 94%   BMI 44.32 kg/m²    Tmax:        Intake/Output Summary (Last 24 hours) at 12/22/2021 0818  Last data filed at 12/22/2021 0602  Gross per 24 hour   Intake 7377.83 ml   Output 1375 ml   Net 6002.83 ml       EXAM:  General: On vent, sedated, no distress  Head: normocephalic, atraumatic  Eyes:No gross abnormalities.   ENT:  MMM no lesions, ET and OG tube in  Neck:  supple and no masses  Chest : Bilateral rales, good air movement, no wheezing, nontender, tympanic  Heart[de-identified] Heart sounds are normal.  Regular rate and rhythm without murmur, gallop or rub. ABD:  symmetric, soft, no apparent tenderness  Musculoskeletal : no cyanosis, no clubbing and no edema  Neuro: Sedated, currently on Nimbex drip, did not follow commands  Skin: No rashes or nodules noted. Lymph node:  no cervical nodes  Urology: Yes Serna   Psychiatric: unresponsive, sedated    Medications:  Scheduled Meds:   metoclopramide  10 mg IntraVENous TID    sodium chloride flush  5-40 mL IntraVENous 2 times per day    insulin glargine  8 Units SubCUTAneous Nightly    cefepime  1,000 mg IntraVENous Q24H    [Held by provider] baricitinib  1 mg Oral Daily    heparin (porcine)  5,000 Units SubCUTAneous 3 times per day    famotidine (PEPCID) injection  10 mg IntraVENous Daily    polyethylene glycol  17 g Oral Daily    senna  5 mL Oral BID    insulin lispro  0-12 Units SubCUTAneous Q4H    chlorhexidine  15 mL Mouth/Throat BID    sodium chloride flush  5-40 mL IntraVENous 2 times per day       PRN Meds:  sodium chloride flush, sodium chloride, glucose, dextrose, glucagon (rDNA), dextrose, fentanNYL, sodium chloride flush, sodium chloride, ondansetron **OR** ondansetron, acetaminophen **OR** acetaminophen    Results: reviewed by me   CBC:   Recent Labs     12/20/21  0600 12/20/21  0703 12/21/21  0600 12/21/21  0736 12/22/21  0016 12/22/21  0448 12/22/21  0749   WBC 19.7*  --  17.0*  --   --  16.0*  --    HGB 10.1*   < > 10.1*   < > 10.1* 9.3* 13.0   HCT 31.9*  --  31.6*  --   --  30.0*  --    MCV 90.0  --  91.0  --   --  91.4  --    *  --  361  --   --  304  --     < > = values in this interval not displayed.      BMP:   Recent Labs     12/21/21  0045 12/21/21  0045 12/21/21  0600 12/21/21  0736 12/22/21  0016 12/22/21  0448 12/22/21  0749     --  152*  --   --  137  --    K 4.8  --  4.9  --   --  4.8  --    * --  120*  --   --  104  --    CO2 17*  --  12*  --   --  18*  --    *  --  120*  --   --  109*  --    CREATININE 2.92*   < > 3.36*   < > 3.2* 3.41* 3.6*    < > = values in this interval not displayed. LIVER PROFILE:   Recent Labs     12/21/21  0045 12/21/21  0600 12/22/21  0448   AST 21 22 11   ALT 29 28 18   BILITOT 0.3 <0.2 <0.2   ALKPHOS 140* 140* 118     PT/INR: No results for input(s): PROTIME, INR in the last 72 hours. APTT: No results for input(s): APTT in the last 72 hours. UA:  Recent Labs     12/20/21  2350   COLORU Yellow   PHUR 5.0   WBCUA 0-2   BACTERIA Negative   CLARITYU Clear   SPECGRAV 1.015   LEUKOCYTESUR Negative   UROBILINOGEN 0.2   BILIRUBINUR Negative   BLOODU Negative   GLUCOSEU Negative       Cultures:  Sputum culture shows MSSA, urine culture Pseudomonas  Films:  CXR reviewed by me and it showed worsening bilateral groundglass infiltrates  CT head shows no acute intracranial event, pansinusitis    Assessment:   This is a critically ill patient at risk of deterioration / death , needing close ICU monitoring and intervention due to below noted problems   · Acute hypoxic respiratory failure  · Severe COVID-19 pneumonia  · ARDS  · Septic shock  · MSSA pneumonia due to bacterial coinfection  · Pseudomonas UTI  · SHLOMO, improved today    Recommendation  · Vent support lung protective strategy  · head of the bed 30°  · Breathing trials   when lung mechanics improve  · Wean off Nimbex  · Sedation holiday when patient improves  · Watch for ICU delirium: TV on, natural light, avoid benzos, pain control, early mobility, and family engagement  · PUD prophylaxis  · DVT prophylaxis  · Levophed to maintain mean arterial pressure 65-70  · Watch renal function and urine output  · Target blood sugar 140-180  · 18 hours prone, 6 hours supine   · Continue tube feed status post  · Adjust water flushes   · Continue bicarb drip target pH 7.2, will change to 3 amp sodium bicarb  · Consider Lasix drip if okay with nephrology    Discussed with patient sisters    Due to the immediate potential for life-threatening deterioration due to acute respiratory failure I spent 40  minutes providing critical care.  This time is excluding time spent performing procedures. Addendum    Patient sisters called me to the room and requested withdrawal of care and compassionate extubation.     Electronically signed by Tena Ch MD,  [facility] ,on 12/22/2021 at 8:18 AM

## 2021-12-22 NOTE — PROGRESS NOTES
to auscultation  Cardiovascular: Regular rate and rhythm   Abdomen: Soft, non-tender, non-distended with normal bowel sounds. Musculoskeletal: No clubbing, cyanosis or edema bilaterally. Neuro: Sedated  Capillary Refill: Brisk,< 3 seconds   Peripheral Pulses: +2 palpable, equal bilaterally     Labs:   Recent Labs     12/20/21 0600 12/20/21  0703 12/21/21  0600 12/21/21  0736 12/22/21  0016 12/22/21 0448 12/22/21  0749   WBC 19.7*  --  17.0*  --   --  16.0*  --    HGB 10.1*   < > 10.1*   < > 10.1* 9.3* 13.0   HCT 31.9*  --  31.6*  --   --  30.0*  --    *  --  361  --   --  304  --     < > = values in this interval not displayed. Recent Labs     12/21/21 0045 12/21/21  0045 12/21/21 0600 12/21/21  0736 12/22/21  0016 12/22/21 0448 12/22/21  0749     --  152*  --   --  137  --    K 4.8  --  4.9  --   --  4.8  --    *  --  120*  --   --  104  --    CO2 17*  --  12*  --   --  18*  --    *  --  120*  --   --  109*  --    CREATININE 2.92*   < > 3.36*   < > 3.2* 3.41* 3.6*   CALCIUM 9.0  --  9.0  --   --  8.6  --     < > = values in this interval not displayed. Recent Labs     12/21/21 0045 12/21/21 0600 12/22/21 0448   AST 21 22 11   ALT 29 28 18   BILITOT 0.3 <0.2 <0.2   ALKPHOS 140* 140* 118     No results for input(s): INR in the last 72 hours. No results for input(s): Marianna Fuchs in the last 72 hours. Urinalysis:      Lab Results   Component Value Date    NITRU Negative 12/20/2021    WBCUA 0-2 12/20/2021    BACTERIA Negative 12/20/2021    RBCUA  12/15/2021    BLOODU Negative 12/20/2021    SPECGRAV 1.015 12/20/2021    GLUCOSEU Negative 12/20/2021       Radiology:  US RETROPERITONEAL LIMITED   Final Result      NEGATIVE LIMITED RENAL ULTRASOUND. CHOLELITHIASIS. XR CHEST (SINGLE VIEW FRONTAL)   Final Result      CT HEAD WO CONTRAST   Final Result   No acute intracranial abnormality; no acute infarct or intracranial hemorrhage.    Extensive pansinusitis with maxillary sinus air-fluid levels and bilateral mastoid effusions, likely related to tube placement. IR PICC WO SQ PORT/PUMP > 5 YEARS   Final Result      US ABDOMEN LIMITED   Final Result      CHOLELITHIASIS WITHIN A MILDLY DISTENDED GALLBLADDER. NO BILIARY DILATATION OR OTHER FINDINGS OF ACUTE CHOLECYSTITIS, WITHIN THE LIMITS OF THE STUDY. XR CHEST PORTABLE   Final Result   Status post central line placement. There are bilateral alveolar opacities , infiltrates worrisome for viral COVID-19 pneumonia. XR CHEST PORTABLE   Final Result   There are bilateral alveolar opacities , infiltrates worrisome for viral COVID-19 pneumonia. XR CHEST PORTABLE   Final Result   Status post intubation. There are bilateral alveolar opacities , infiltrates worrisome for viral COVID-19 pneumonia. CTA CHEST W WO CONTRAST   Final Result      No CT evidence for pulmonary embolus. Ground glass opacities, associated with COVID 19 pneumonia among multiple other etiologies.                        Assessment/Plan:    # Acute hypoxic and hypercapnic respiratory failure 2/2 COVID-19 pneumonia, MSSA pneumonia with septic shock  - completed Remdesivir, continue decadron   -  Proning per pulm recs  - ID consulted- continue meropenem  - pressors as needed  - RCx with MSSA, UCx with pseudomonas - on abx     # SHLOMO; hypernatremia    - likely secondary to ATN; nephrology is managing; hypernatremia resolved     # Hyperglycemia and new onset DM  - SSI    #Metabolic acidosis    - continue bicarb     DVT: lovenox         Active Hospital Problems    Diagnosis Date Noted    Staphylococcus aureus pneumonia ([facility] [J15.211]     Pseudomonas urinary tract infection [N39.0, B96.5]     Acute kidney injury ([facility] [N17.9]     Acute bacterial sinusitis [J01.90, B96.89]     Elevated liver function tests [R79.89]     Advance care planning [Z71.89]     Goals of care, counseling/discussion [Z71.89]     Palliative care encounter [Z51.5]     Acute respiratory failure with hypoxia ([facility] [J96.01]     Hypercoagulable state ([facility] [D68.59]     Sepsis due to COVID-19 ([facility] [U07.1, A41.89] 12/09/2021       Additional work up or/and treatment plan may be added today or then after based on clinical progression. I am managing a portion of pt care. Some medical issues are handled by other specialists. Additional work up and treatment should be done in out pt setting by pt PCP and other out pt providers. In addition to examining and evaluating pt, I spent additional time explaining care, normal and abnormal findings, and treatment plan. All of pt questions were answered. Counseling, diet and education were  provided. Case will be discussed with nursing staff when appropriate. Family will be updated if and when appropriate.       Diet: Diet NPO  ADULT TUBE FEEDING; Orogastric; Peptide Based High Protein; Continuous; 20; No; 100; Q 4 hours; Protein; add 1 proteinex with water flush daily    Code Status: Full Code    Electronically signed by Aleks Rodríguez MD on 12/22/2021 at 1:16 PM

## 2021-12-22 NOTE — PROGRESS NOTES
1939  Baseline train of four obtained. 4 twitches observed at 3 amps. Nimbex bolus administered and drip started at 2 mcg/kg/min. 2000  Assessment complete - limited as patient is currently positioned prone. Patient remains on ventilator with propofol, precedex, and fentanyl for sedation. Levophed, nimbex, and sodium bicarb infusing per MAR. NSR on telemetry. 2030  Nimbex increased for 4/4 twitches. 2100  160 cc residual via OG. Tube feed resumed at rate of 20 cc/hr. 2130  Nimbex adjusted for 1/4 twitches. 2330  2/4 twitches with nimbex at 2 mcg/kg/min. 0000  Assessment unchanged. Patient remains prone.

## 2021-12-22 NOTE — PROGRESS NOTES
Infectious Diseases Inpatient Progress Note          HISTORY OF PRESENT ILLNESS:   Patient has intermittent low-grade fevers, currently on IV cefepime yesterday. Currently in a prone position. Sedated on propofol fentanyl. Currently off Nimbex. on assist control 80%. Has difficulty weaning of the ventilator. Currently very acidotic with pH of 7.109  Tolerating tube feeding   Positive liquid stool in Flexi-Seal   currently on Levophed at 10 mics  Acute kidney injury   Good urine output  Currently on IV cefepime for Pseudomonas UTI and staph aureus pneumonia  Status post baricitinib  Status post Decadron  Blood cultures done 2 days ago remain to be negative  Sputum culture from 3 days ago persist to be positive for methicillin sensitive staph aureus  Persistently decreased renal function  Decreasing leukocytosis  Current Medications:     metoclopramide  10 mg IntraVENous TID    sodium chloride flush  5-40 mL IntraVENous 2 times per day    insulin glargine  8 Units SubCUTAneous Nightly    cefepime  1,000 mg IntraVENous Q24H    heparin (porcine)  5,000 Units SubCUTAneous 3 times per day    famotidine (PEPCID) injection  10 mg IntraVENous Daily    polyethylene glycol  17 g Oral Daily    senna  5 mL Oral BID    insulin lispro  0-12 Units SubCUTAneous Q4H    chlorhexidine  15 mL Mouth/Throat BID    sodium chloride flush  5-40 mL IntraVENous 2 times per day       Allergies:  Patient has no known allergies. Review of Systems  unable to provide ROS because of sedation      Physical Exam  Vitals:    12/22/21 1345 12/22/21 1400 12/22/21 1415 12/22/21 1430   BP:       Pulse: 57 56 56 56   Resp: (!) 32 (!) 32 (!) 32 (!) 32   Temp:       TempSrc:       SpO2: 98% 98% 98% 98%   Weight:       Height:         General Appearance: Intubated and sedated, on assist control 80%. Currently in a prone position  Nonresponsive to painful stimulation stimulation  Skin: warm and dry, no rash.    Head: normocephalic and atraumatic  Eyes: anicteric sclerae  ENT: Intact ET and OG  Lungs: Assisted ventilation, diminished breath sounds bilateral lung fields with fine rales  Heart regular rhythm  Abdomen: soft, no distention or rigidity  Bilateral leg swelling, no edema  no erythema  Clear urine, good urine output  Flexi-Seal with liquid stools      DATA:    Lab Results   Component Value Date    WBC 16.0 (H) 12/22/2021    HGB 13.0 12/22/2021    HCT 30.0 (L) 12/22/2021    MCV 91.4 12/22/2021     12/22/2021     Lab Results   Component Value Date    CREATININE 3.6 (H) 12/22/2021     (HH) 12/22/2021     12/22/2021    K 4.8 12/22/2021     12/22/2021    CO2 18 (L) 12/22/2021       Hepatic Function Panel:  Lab Results   Component Value Date    ALKPHOS 118 12/22/2021    ALT 18 12/22/2021    AST 11 12/22/2021    PROT 5.8 12/22/2021    BILITOT <0.2 12/22/2021    LABALBU 2.1 12/22/2021   Today's labs  D-dimer= 3.8  Procalcitonin= 1.22  Sputum culture  with methicillin sensitive staph aureus  Blood cultures are negative  Chest x-ray from 2 days ago with interval progression of bilateral pneumonia  IMPRESSION:    · Critical COVID-19 pneumonia and sepsis  · bacterial pneumonia with MSSA  · Pseudomonas aeruginosa UTI  · Acute respiratory failure with severe hypoxia requiring intubation  · Hypercoagulable state  · Acute sinusitis  · Acute kidney injury    Patient Active Problem List   Diagnosis    Sepsis due to COVID-19 [facility])    Acute respiratory failure with hypoxia (HCC)    [facility]coagulable state ([facility]    Anemia    Anatomical narrow angle, bilateral    Bilateral carpal tunnel syndrome    Bilateral hearing loss    Chronic bilateral low back pain without sciatica    Floaters    Hyperopia    Lamellar macular hole of right eye    Meralgia paraesthetica, left    Murmur    Obesity, Class III, BMI 40-49.9 (morbid obesity) (HCC)    Presbyopia of both eyes    Primary osteoarthritis of both knees    Pseudophakia of both eyes    Regular astigmatism    Tinnitus of both ears    Vitreomacular traction syndrome of right eye    Elevated liver function tests    Advance care planning    Goals of care, counseling/discussion    Palliative care encounter    Acute bacterial sinusitis    Acute kidney injury ([facility]    Staphylococcus aureus pneumonia ([facility]    Pseudomonas urinary tract infection       PLAN:  · IV cefepime  · F/U Repeat blood cultures  · follow-up CBC complete metabolic  · Follow-up D-dimer and chest x-ray  · Vent support and weaning as tolerated  · Vasopressor support and weaning as tolerated  · Follow-up with nephrology for acute kidney injury  · May consider terminal wean when patient is ready  Overall poor prognosis  35 minutes were spent reviewing chart and implementing care and examining patient    Flor Baker MD

## 2021-12-22 NOTE — FLOWSHEET NOTE
Spiritual Care Services     Summary of Visit:  During rounding DR BELTRAN shared some health concerns with the pt's two sisters Hansa Parnell and Sam Goltz), both sisters are wrestling with the decision they know they need to make for Mckenzie Watts, but it is a hard one.  Sean Dominguezkyle shared that they have had to make end of life decision for their mother and their brother in the past 5 years, their mother passed away a couple of years ago, Mckenzie Watts is the oldest sibling and they shared that she was the glue for the family, she is a very caring person and seeing her in current health condition they know she would not want to go on, Sylvester Kyle and Sam Goltz both appreciate the care Mckenzie Watts has been receiving while in ICU, prayed for Sylvester Kyle and Sam Goltz and the patient Mckenzie Watts, before leaving the room,     Spiritual Assessment/Intervention/Outcomes:    Encounter Summary  Services provided to[de-identified] (P) Patient and family together  Referral/Consult From[de-identified] (P) Rounding  Support System: (P) Family members  [facility]  Contact Scientology: No  Continue Visiting: (P) No  Complexity of Encounter: (P) Moderate  Length of Encounter: (P) 30 minutes  Spiritual Assessment Completed: (P) Yes  Advance Care Planning: Yes  Routine  Type: (P) Follow up  Assessment: Unable to respond  Intervention: Sustaining presence/ Ministry of presence,Prayer  Outcome: Comfort,Expressed gratitude,Engaged in conversation,Receptive,Encouraged           Grief and Life Adjustment  Type: (P) End of life,Adjustment to illness  Assessment: (P) Approachable,Tearful,Anticipatory grief,Concerns with suffering  Intervention: (P) Active listening,Explored feelings, thoughts, concerns,Prayer,Sustaining presence/ Ministry of presence,Discussed meaning/purpose  Outcome: (P) Comfort,Engaged in conversation,Tearful,Shared reminiscences,Receptive  Primary Decision Maker (Healthcare Proxy)  [address] is[facility]de-identified[facility]  Do you have any ethnic, cultural, sacramental, or spiritual Scientologist needs you would like us to be aware of while you are in the hospital?: No    Care Plan:    On going spiritual care support     [id number] [address]   Electronically signed by Jemma Yeboah on 12/22/21 at 10:18 AM EST     To reach a  for emotional and spiritual support, place an [facility] consult request.   If a  is needed immediately, dial 0 and ask to page the on-call .

## 2021-12-22 NOTE — PROGRESS NOTES
Daily Update    From: Home-     PMH: Shingles, headaches, anemia. Anticipated Discharge Date: TBD    Anticipated Discharge Disposition: TBD    Patient Mobility or PT/OT ordered: NA - On vent. Consults: Pulm, ID, Palliative Care, renal.    Covid Test Date and Result: 11/29 Home COVID test - positive. 12/11/21~Intubated. Barriers to Discharge:   Patient remains on vent @ 80% FIO2, highly sedated for safety. Patient is on max sedation ~ fent, propofol, and precedex gtts. Nimbex infusion started 12/21 PM based on train of four scores. 14 [facility] completed 12/20 - no acute findings. Renal function continues to worsen, creatinine now 3.41. Bicarb gtt infusing and levo gtt titrated per orders. Baricitininb and Cefipime continue. Patient is proned per protocol, WBC ^ 16. 0, persistent elevated temps. Tolerating TF and water flushes. Overall poor prognosis per provider notes. DC plan pending progress and care needs. CM to follow for care needs/referrals.

## 2021-12-26 LAB
BLOOD CULTURE, ROUTINE: NORMAL
CULTURE, BLOOD 2: NORMAL

## 2021-12-31 NOTE — DISCHARGE SUMMARY
Hospital Medicine Discharge Summary    Lucinda Vega  :  1950  MRN:  [id number]    Admit date:  2021  Discharge date:  21    Admitting Physician:  Sukh Membreno MD  Primary Care Physician:  No primary care provider on file. Discharge Diagnoses:    Acute hypoxic and hypercapnic respiratory failure 2/2 COVID-19 pneumonia, MSSA pneumonia with septic shock    Hospital Course:   Patient presented with Acute hypoxic and hypercapnic respiratory failure 2/2 COVID-19 pneumonia which was complicated by MSSA pneumonia with septic shock. The patients family opted for a compassionate wean and she passed away afterwards.     Signed:  Sukh Membreno MD  2021, 12:15 PM  ----------------------------------------------------------------------------------------------------------------------    Lucinda Vega

## 2023-01-05 NOTE — PROGRESS NOTES
Palliative Care Progress Note  Patient: Irena Hoffmann  Gender: female  YOB: 1950  Unit/Bed: IC10/IC10-01  CodeStatus: Full Code  Inpatient Treatment Team: Treatment Team: Attending Provider: Nga Bradley MD; Consulting Physician: Cheyanne Guallpa MD; Utilization Reviewer: Alia Dhaliwal, RN; Consulting Physician: Osmani Landon MD; Utilization Reviewer: Ada Grey RN; Consulting Physician: Ori Johnson MD; : Katherine Ray; Registered Nurse: Maura Barajas RN  Admit Date:  12/9/2021    Chief Complaint: Shortness of breath    History of Presenting Illness:      Irena Hoffmann is a 66 y.o. female on hospital day 6 with a history of anemia, carpal tunnel, heart murmur, OA, and hearing loss. Patient presented to the ER from home with shortness of breath. She started feeling sick 10 days ago with headache, body aches, and fatigue. Patient also reported poor appetite, intermittent chills, fever, and cough. Currently not vaccinated. Patient was admitted to a regular medical floor with COVID-19 pneumonia. She later was transferred to the ICU and intubated. Patient remains sedated and intubated with FiO2 of 70% PEEP of 14. Patient is currently unresponsive. HPI limited due to unresponsive. Spoke with sister, Basilio Zhao. Basilio Zhao reports that patient's HPOA is her sister Anisha Cole, however, Anisha Cole is currently awaiting an ICU bed as well at [facility].      Most recent notable labs: ABGs: pH 7.303, PCO2 50, PO2 59, O2 Sat 87, Creat. 1.99, GFR 24.6, albumin 2.6, TB 3.1, Alk Phos. 147, , .    12/15/21:  Patient was proned and back to supine. Vent support was decreased until placed supine again. She remains on sedation and unresponsive. When proned, FiO2 was at 50% and PEEP at 10. Most recent notable labs: pO2, arterial 69, O2 Sat, Arterial 91, creat.  1.19, GFR 44.6, BUN 45, albumin 2.6, AST 64, WBC 19.3.    12/16/21:  Patient currently proned, intubated, and sedated. She is unresponsive. FiO2 at 90%. Most recent notable labs: WBC 19.8, 5.53, creat. 1.96, GFR 25.1, BUN 53, Albumin 2.4, ALT 59, pH 7.172, pCO2 76, pO2 131.    12/17/21      Remains proned and intubated, sedated, on pressors, unresponsive. Oxygenation status has been better. Currently on 70% FiO2 and PEEP of 12. Urine output has been decreasing. Kidney function has been getting worse. 12/18/21     Remains intubated currently on 80% FiO2, saturation 94%, on levophed, renal function declining. She responds to discomfort minimally even when on sedation holiday. Her oxygenation also drops into the 80s on sedation holiday. Appears calm, BPS 3. Review of Systems:       Review of Systems   Unable to perform ROS: Intubated   All other systems reviewed and are negative. Physical Examination:       BP (!) 122/42   Pulse 68   Temp 99.7 °F (37.6 °C) (Rectal)   Resp (!) 34   Ht 5' 1\" (1.549 m)   Wt 226 lb 13.7 oz (102.9 kg)   SpO2 (S) (!) 86%   BMI 42.86 kg/m²    Physical Exam  Constitutional:       Appearance: She is ill-appearing. Interventions: She is sedated and intubated. HENT:      Head: Normocephalic and atraumatic. Nose: No rhinorrhea. Eyes:      General:         Right eye: No discharge. Comments: Unable to assess due to prone position. Neck:      Comments: Unable to assess due to prone position. Cardiovascular:      Rate and Rhythm: Normal rate and regular rhythm. Pulmonary:      Effort: She is intubated. Breath sounds: Decreased breath sounds present. Abdominal:      Comments: Unable to assess due to prone position. Genitourinary:     Comments: Serna with clear yellow urine. Musculoskeletal:      Right lower leg: No edema. Left lower leg: No edema. Skin:     General: Skin is warm and dry. Neurological:      Mental Status: She is unresponsive. Comments: Sedated.      Psychiatric:         Speech: She is noncommunicative. Cognition and Memory: Cognition is impaired.          Allergies:      No Known Allergies    Medications:      Current Facility-Administered Medications   Medication Dose Route Frequency Provider Last Rate Last Admin    lidocaine 2 % injection 5 mL  5 mL IntraDERmal Once TEJINDER Jordan CNP        sodium chloride flush 0.9 % injection 5-40 mL  5-40 mL IntraVENous 2 times per day TEJINDER Jordan CNP        sodium chloride flush 0.9 % injection 5-40 mL  5-40 mL IntraVENous PRN TEJINDER Jordan CNP        0.9 % sodium chloride infusion  25 mL IntraVENous PRN TEJINDER Jordan - CNP        0.9 % sodium chloride infusion  250 mL IntraVENous Once TEJINDER Jordan CNP        sodium zirconium cyclosilicate (LOKELMA) oral suspension 10 g  10 g Oral TID Rosa Haywood MD   10 g at 12/20/21 1034    insulin glargine (LANTUS) injection vial 8 Units  8 Units SubCUTAneous Nightly Rosa Haywood MD        [Held by provider] baricitinib (OLUMIANT) tablet 1 mg  1 mg Oral Daily Rosa Haywood MD   1 mg at 12/20/21 9931    heparin (porcine) injection 5,000 Units  5,000 Units SubCUTAneous 3 times per day Daria Conde MD   5,000 Units at 12/20/21 0611    meropenem (MERREM) 500 mg IVPB (mini-bag)  500 mg IntraVENous Q12H Phuong Trejo MD   Stopped at 12/20/21 0355    famotidine (PEPCID) injection 10 mg  10 mg IntraVENous Daily [facility] DO   10 mg at 12/20/21 0823    lactulose (CHRONULAC) 10 GM/15ML solution 20 g  20 g Oral TID Daria Conde MD   20 g at 12/20/21 8015    polyethylene glycol (GLYCOLAX) packet 17 g  17 g Oral Daily TEJINDER Jordan CNP   17 g at 12/20/21 0823    senna (SENOKOT) 8.8 MG/5ML syrup 8.8 mg  5 mL Oral BID TEJINDER Jordan CNP   8.8 mg at 12/20/21 4200    glucose (GLUTOSE) 40 % oral gel 15 g  15 g Oral PRN Aleks Rodríguez MD        dextrose 50 % IV solution  12.5 g IntraVENous PRN Aleks Rodríguez, MD        glucagon (rDNA) injection 1 mg  1 mg IntraMUSCular PRN Lilly Carrizales MD        dextrose 5 % solution  100 mL/hr IntraVENous PRN Lilly Carrizales MD        insulin lispro (HUMALOG) injection vial 0-12 Units  0-12 Units SubCUTAneous Q4H Lilly Carrizales MD   2 Units at 12/19/21 2120    dexmedetomidine (PRECEDEX) 1,000 mcg in sodium chloride 0.9 % 250 mL infusion  0.2-1.4 mcg/kg/hr IntraVENous Continuous Jennifer Meek MD 32.97 mL/hr at 12/20/21 0430 1.4 mcg/kg/hr at 12/20/21 0430    propofol injection  5-50 mcg/kg/min IntraVENous Titrated Jennifer Meek MD 28.3 mL/hr at 12/20/21 1004 50 mcg/kg/min at 12/20/21 1004    fentaNYL (SUBLIMAZE) 1,000 mcg in sodium chloride 0.9 % 100 mL infusion  12.5-200 mcg/hr IntraVENous Continuous Jennifer Meek MD 20 mL/hr at 12/20/21 1004 200 mcg/hr at 12/20/21 1004    chlorhexidine (PERIDEX) 0.12 % solution 15 mL  15 mL Mouth/Throat BID Lilly Carrizales MD   15 mL at 12/20/21 0822    fentaNYL (SUBLIMAZE) injection 50 mcg  50 mcg IntraVENous Q1H PRN Lilly Carrizales MD        norepinephrine (LEVOPHED) 16 mg in dextrose 5% 250 mL infusion  2-100 mcg/min IntraVENous Continuous Jennifer Meek MD 7.5 mL/hr at 12/20/21 0410 8 mcg/min at 12/20/21 0410    sodium chloride flush 0.9 % injection 5-40 mL  5-40 mL IntraVENous 2 times per day Anibal Perez MD   10 mL at 12/19/21 2031    sodium chloride flush 0.9 % injection 5-40 mL  5-40 mL IntraVENous PRN Anibal Perez MD        0.9 % sodium chloride infusion  25 mL IntraVENous PRN Anibal Perez MD        ondansetron (ZOFRAN-ODT) disintegrating tablet 4 mg  4 mg Oral Q8H PRN Anibal Perez MD        Or    [facility]ans[facility]ron [facility] injection 4 mg  4 mg IntraVENous Q6H PRN Anibal Perez MD        acetaminophen (TYLENOL) tablet 650 mg  650 mg Oral Q6H PRN Anibal Perez MD   650 mg at 12/20/21 0047    Or    acetaminophen (TYLENOL) suppository 650 mg  650 mg Rectal Q6H PRN Anibal Perez MD           History:       PMHx:  Past Medical History:   Diagnosis Date    Acute kidney injury ([facility]     Bilateral carpal tunnel syndrome 11/15/2019    Chronic bilateral low back pain without sciatica 11/15/2019    Headache(784.0)     History of mammography, screening 1990's    History of shingles 2009    Obesity, Class III, BMI 40-49.9 (morbid obesity) [facility] 3/27/2018    Papanicolaou smear for cervical cancer screening     NEVER    Primary osteoarthritis of both knees 5/25/2016       PSHx:  Past Surgical History:   Procedure Laterality Date    APPENDECTOMY      BREAST SURGERY  1990s       Social Hx:  Social History     Socioeconomic History    Marital status: Single     Spouse name: None    Number of children: None    Years of education: None    Highest education level: None   Occupational History    None   Tobacco Use    Smoking status: Never Smoker    Smokeless tobacco: Never Used   Substance and Sexual Activity    Alcohol use: No    Drug use: No    Sexual activity: Never   Other Topics Concern    None   Social History Narrative    None     Social Determinants of Health     Financial Resource Strain:     Difficulty of Paying Living Expenses: Not on file   Food Insecurity:     Worried About Running Out of Food in the Last Year: Not on file    Ian of Food in the Last Year: Not on file   Transportation Needs:     Lack of Transportation (Medical): Not on file    Lack of Transportation (Non-Medical):  Not on file   Physical Activity:     Days of Exercise per Week: Not on file    Minutes of Exercise per Session: Not on file   Stress:     Feeling of Stress : Not on file   Social Connections:     Frequency of Communication with Friends and Family: Not on file    Frequency of Social Gatherings with Friends and Family: Not on file    Attends Gnosticist Services: Not on file    Active Member of Clubs or Organizations: Not on file    Attends Club or Organization Meetings: Not on file    Marital Status: Not on file   Intimate Partner Violence:     Fear of Current or Ex-Partner: Not on file    Emotionally Abused: Not on file    Physically Abused: Not on file    Sexually Abused: Not on file   Housing Stability:     Unable to Pay for Housing in the Last Year: Not on file    Number of Places Lived in the Last Year: Not on file    Unstable Housing in the Last Year: Not on file       Family Hx:  Family History   Problem Relation Age of Onset    High Blood Pressure Mother        LABS: Reviewed     CBC:  Lab Results   Component Value Date    WBC 19.7 12/20/2021    RBC 3.54 12/20/2021    HGB 11.2 12/20/2021    HCT 31.9 12/20/2021    MCV 90.0 12/20/2021    MCH 28.6 12/20/2021    MCHC 31.8 12/20/2021    RDW 14.7 12/20/2021     12/20/2021     CBC with Differential:   Lab Results   Component Value Date    WBC 19.7 12/20/2021    RBC 3.54 12/20/2021    HGB 11.2 12/20/2021    HCT 31.9 12/20/2021     12/20/2021    MCV 90.0 12/20/2021    MCH 28.6 12/20/2021    MCHC 31.8 12/20/2021    RDW 14.7 12/20/2021    BANDSPCT 3 12/15/2021    METASPCT 1 12/18/2021    LYMPHOPCT 4.7 12/20/2021    PROMYELOPCT 1 12/14/2021    MONOPCT 2.5 12/20/2021    MYELOPCT 1 12/14/2021    BASOPCT 0.1 12/20/2021    MONOSABS 0.5 12/20/2021    LYMPHSABS 0.9 12/20/2021    EOSABS 0.1 12/20/2021    BASOSABS 0.0 12/20/2021     CMP:    Lab Results   Component Value Date     12/20/2021    K 5.4 12/20/2021     12/20/2021    CO2 20 12/20/2021     12/20/2021    CREATININE 3.6 12/20/2021    CREATININE 3.36 12/20/2021    GFRAA 15 12/20/2021    LABGLOM 12 12/20/2021    GLUCOSE 104 12/20/2021    PROT 5.8 12/20/2021    LABALBU 2.2 12/20/2021    CALCIUM 9.0 12/20/2021    BILITOT 0.4 12/20/2021    ALKPHOS 160 12/20/2021    AST 25 12/20/2021    ALT 29 12/20/2021     BMP:    Lab Results   Component Value Date     12/20/2021    K 5.4 12/20/2021     12/20/2021    CO2 20 12/20/2021     12/20/2021    LABALBU 2.2 12/20/2021    CREATININE 3.6 12/20/2021    CREATININE 3.36 12/20/2021    CALCIUM 9.0 12/20/2021    GFRAA 15 12/20/2021    LABGLOM 12 12/20/2021    GLUCOSE 104 12/20/2021     TSH: No results found for: TSH  Vitamin B12 and Folate: No components found for: FOLIC,  U79  Urinalysis:   Lab Results   Component Value Date    NITRU Negative 12/15/2021    WBCUA 20-50 12/15/2021    BACTERIA Negative 12/15/2021    RBCUA  12/15/2021    BLOODU MODERATE 12/15/2021    SPECGRAV 1.019 12/15/2021    GLUCOSEU Negative 12/15/2021           FUNCTIONAL ADL´S:     Independent: [  ] Eating, [   ] Dressing, [   ] Transferring, [   ] Steve Decant, [   ] Rob Nestor, [   ] Continence  Dependent   : [ x ] Eating, [ x ] Dressing, [ x ] Transferring, [ x ] Toileting, [ x ] Bathing, [ x ] Continence  W. assistant : [  ] Eating, [   ] Dressing, [   ] Transferring, [   ] Steve Decant, [   ] Bathing, [   ] Continence    Radiology: Reviewed      XR CHEST PORTABLE    Result Date: 12/12/2021  Exam: XR CHEST PORTABLE History:  post line placement Technique: AP portable view of the chest obtained. Comparison: none Chest x-ray portable Findings: The patient is intubated, there is an NG tube coursing towards the stomach    There is a  right internal jugular central line with the tip projecting in the region of the superior vena cava. The cardiomediastinal silhouette is within normal limits. There is no pneumothorax There are bilateral patchy alveolar opacities. There are no pleural effusions. Bones of the thorax appear intact. Status post central line placement. There are bilateral alveolar opacities , infiltrates worrisome for viral COVID-19 pneumonia. XR CHEST PORTABLE    Result Date: 12/12/2021  Exam: XR CHEST PORTABLE History:  f/u intubated yesterday, covid Technique: AP portable view of the chest obtained. Comparison: none Chest x-ray portable Findings: The patient is intubated, there is an NG tube coursing towards the stomach  The cardiomediastinal silhouette is within normal limits.  There are bilateral patchy alveolar opacities. There are no pleural effusions. Bones of the thorax appear intact. There are bilateral alveolar opacities , infiltrates worrisome for viral COVID-19 pneumonia. XR CHEST PORTABLE    Result Date: 12/11/2021  Exam: XR CHEST PORTABLE History:  intubation Technique: AP portable view of the chest obtained. Comparison: none Chest x-ray portable Findings: The patient is intubated with the tip of the endotracheal tube 4 cm above the lolly. The cardiac silhouette is at the upper limits of normal in size. There is no pneumothorax. There are bilateral patchy alveolar opacities. Bones of the thorax appear intact. Status post intubation. There are bilateral alveolar opacities , infiltrates worrisome for viral COVID-19 pneumonia. Assessment and plan:      12/16/21:  Spoke with sister, Jose Elias Marrero, to give update. Also sent her a completed form for her travel insurance as she had to cancel cruise due to the complexity of patient's medical condition and needing to make decisions for her. No other questions or concerns at this time. 12/15/21:  Discussed care and provided update to patient's sister, Jose Elias Marrero. All questions answered. Will write letter for Eden's travel insurance, as she was suppose to go on a cruise but had to cancel due to her sister's condition and needing to make healthcare decisions for her. 1. Palliative care encounter  Explained the role of palliative care in treating patient. Discussed symptom management related to chronic disease/condition. Provided emotional support and active listening. Patient's sister understands and is agreeable to current plan. Spoke with intensivist, Dr. Jeffrey Jaramillo, for update on patient's condition. 12/20/21     I spoke with her sister Jose Elias Marrero to offer update. I discussed our concern with Marina's lack of response when she is on sedation holiday.  Discussed how her prognosis is guarded and if she is unable to maintain her own airway she would require a trach and peg to maintain airway and nutrition and may remain that way the rest of her life. Let Daquan Mascorro know that Jose Yuan is off isolation. Answered all questions to the best of my ability. -Advance Care Planning  Discussed goals of care with patient's sister Daquan Mascorro. Patient shall remain a FULL CODE at this time. HPOA in place: Kate Morris (sister). However, she is ill with COVID as well and is waiting for an ICU bed. Lizeth Hart currently at [facility]. Attempted to call her cell phone. If and when Lizeth Hart able to communicate, will see if she is okay with sister Daquan Mascorro making decisions since she is currently critically ill also. Daquan Mascorro reports another brother and sister in [place]. Spoke with patient's nurse, Dejan Keller. Patient's sister, Lizeth Hart, is coming to ICU, and Dejan Keller will attempt to get consent from Lizeth Hart to let Daquan Mascorro take over decision-making for patient.    -Goals of Care Discussion:  Disease process and goals of treatment were discussed in basic terms. Jose TUCKER's goal is to optimize available comfort care measures and continue with full treatment of COVID-19 infection. We discussed the palliative care philosophy in light of those goals. We discussed all care options contingent on treatment response and QOL. Much active listening, presence, and emotional support were given. Patient is being treated for multiple medical conditions this admission includin. Acute respiratory failure with hypoxia secondary to COVID-19 pneumonia  2. Hyperkalemia  3. Steroid induced hyperglycemia  4. Hypercoagulable state    Total Time: 25 minutes with >50% spent counseling/care coordination.      Electronically signed by TEJINDER Peña CNP on 2021 at 12:29 PM This was a shared visit with the MERCEDES. I reviewed and verified the documentation and independently performed the documented: